# Patient Record
Sex: MALE | Race: WHITE | NOT HISPANIC OR LATINO | Employment: OTHER | URBAN - METROPOLITAN AREA
[De-identification: names, ages, dates, MRNs, and addresses within clinical notes are randomized per-mention and may not be internally consistent; named-entity substitution may affect disease eponyms.]

---

## 2017-10-06 ENCOUNTER — HOSPITAL ENCOUNTER (EMERGENCY)
Facility: MEDICAL CENTER | Age: 82
End: 2017-10-06
Attending: EMERGENCY MEDICINE
Payer: MEDICARE

## 2017-10-06 VITALS
TEMPERATURE: 98.9 F | BODY MASS INDEX: 26.03 KG/M2 | SYSTOLIC BLOOD PRESSURE: 132 MMHG | DIASTOLIC BLOOD PRESSURE: 75 MMHG | WEIGHT: 171.74 LBS | HEIGHT: 68 IN | HEART RATE: 61 BPM | RESPIRATION RATE: 20 BRPM

## 2017-10-06 DIAGNOSIS — T14.8XXA ABRASION: Primary | ICD-10-CM
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
## 2017-10-06 PROCEDURE — 303485 HCHG DRESSING MEDIUM

## 2017-10-06 PROCEDURE — 304217 HCHG IRRIGATION SYSTEM

## 2017-10-06 PROCEDURE — A6403 STERILE GAUZE>16 <= 48 SQ IN: HCPCS

## 2017-10-06 PROCEDURE — 99283 EMERGENCY DEPT VISIT LOW MDM: CPT

## 2017-10-06 RX ORDER — DOXYCYCLINE HYCLATE 100 MG/1
100 CAPSULE ORAL 2 TIMES DAILY
COMMUNITY

## 2017-10-06 ASSESSMENT — PAIN SCALES - GENERAL: PAINLEVEL_OUTOF10: 3

## 2017-10-06 NOTE — ED PROVIDER NOTES
ED Provider Note    CHIEF COMPLAINT  Chief Complaint   Patient presents with   • Arm Injury     left arm fell onto cement       HPI  James Hoffman is a 82 y.o. male who presents abrasion skin tear left forearm. The patient tripped over his hose outside on his deck today. He complains of left forearm skin abrasion only. No bony pain at all. Nothing makes it better, nothing makes it worse.    Patient complains of just abrasion to the skin on his elbow but no bony pain. Did not hurt his neck head spine knees or ankles. He did not lose consciousness.    Last tetanus shot 2012    REVIEW OF SYSTEMS  CONSTITUTIONAL:  Denies feveror weakness  EYES:  Denies  discharge   ENT:  Denies sore throat, nose or ear pain  CARDIOVASCULAR:  Denies chest pain, palpitations or swelling  RESPIRATORY:  Denies cough or shortness of breath or difficulty breathing  GI:  Denies abdominal pain,, vomiting, or diarrhea  MUSCULOSKELETAL:  Denies weakness joint swelling or back pain again he denies any bony pain at all.  SKIN: Positive skin tear large left elbow and forearm.  ALLERGIC: No itchy rashes.  NEUROLOGIC:  Denies headache, focal weakness or numbness      PAST MEDICAL HISTORY  Past Medical History:   Diagnosis Date   • HYPOTHYROIDISM 2014   • Dysphagia 2013    Dr. Joseph, vallecular lesion removed   • Colon polyp, hyperplastic 5/04   • Basal cell carcinoma of face 05/2004    Dr. Lucas   • CVA (cerebral vascular accident) (CMS-[facility]) 01/2003    Residual diplopia, Dr. Cleaning   • TIA 12/2000   • Allergic rhinitis    • ASTHMA    • Cataract    • GOUT    • Rosacea     Dr. Lucas       FAMILY HISTORY  Family History   Problem Relation Age of Onset   • Diabetes Mother    • Genetic Mother      dementia   • Heart Disease Sister    • Cancer Sister      breast cancer   • Diabetes Maternal Aunt    • Diabetes Maternal Grandmother    • Diabetes Maternal Grandfather    • Genetic Son      ALS       SOCIAL HISTORY   reports that he quit smoking about 54  "years ago. His smoking use included Cigarettes. He has a 10.00 pack-year smoking history. He has never used smokeless tobacco. He reports that he drinks alcohol. He reports that he does not use drugs.  He has been  60 years    SURGICAL HISTORY  Past Surgical History:   Procedure Laterality Date   • MICROLARYNGOSCOPY  1/21/2014    Performed by Jimbo Joseph M.D. at SURGERY SAME DAY [facility] ORS   • COLONOSCOPY WITH POLYP  5/2004    Hyperplastic   • KNEE ARTHROSCOPY  10/02    right, Dr. Engle   • TONSILLECTOMY         CURRENT MEDICATIONS  Home Medications     Reviewed by Jessenia Diamond (Pharmacy Tech) on 10/06/17 at 1037  Med List Status: Complete   Medication Last Dose Status   Azelaic Acid (FINACEA) 15 % GEL 10/5/2017 Active   clopidogrel (PLAVIX) 75 MG TABS 10/6/2017 Active   doxycycline (VIBRAMYCIN) 100 MG Cap 10/6/2017 Active   FLOVENT  MCG/ACT AERO 10/6/2017 Active   fluticasone (FLONASE) 50 MCG/ACT nasal spray 10/6/2017 Active   levothyroxine (SYNTHROID) 175 MCG TABS 10/6/2017 Active   METROGEL 0.75 % GEL 10/5/2017 Active                ALLERGIES  Allergies   Allergen Reactions   • Penicillins Anaphylaxis       PHYSICAL EXAM  VITAL SIGNS: /75   Pulse 61   Temp 37.2 °C (98.9 °F)   Resp 20   Ht 1.727 m (5' 8\")   Wt 77.9 kg (171 lb 11.8 oz)   BMI 26.11 kg/m²      Constitutional: Patient is awake alert person place and time. No acute respiratory distress Well developed, Well nourished, Non-toxic appearance.   HENT: Normocephalic, Atraumatic  Neck:  Supple no nuchal rigidity Non-tender  Cardiovascular: Heart is regular rate and rhythm no murmur  Thorax & Lungs: Chest is symmetrical, with good breath sounds. No wheezing or crackles. No respiratory distress  Abdomen:  Soft, No tenderness  Skin: Positive large skin tear left forearm. The skin is retracted. There is no fat to the skin. There is no suturable lesions. Bleeding has stopped. Small abrasion to the left knee but nontender " bones  Back: Non tender with palpation,  Extremities: No edema.  Non tender.   Musculoskeletal: Good range of motion wrists, elbows, shoulders, hips, knees, ankles   Neurologic: Alert & oriented  Strength is symmetrical in upper and lower extremities    RADIOLOGY/PROCEDURES  Areas clean and dry. Usual fashion. Debridement of the skin. I did not feel that this was suturable.  Long discussion with the patient and his wife. They will do wound care and dressing changes starting on Sunday and then every day afterwards. They also see her primary care doctor Monday    COURSE & MEDICAL DECISION MAKING  Pertinent Labs & Imaging studies reviewed. (See chart for details)  Patient who tripped today over a hose in the backyard. He has a large skin tear and abrasion to his left forearm. This is a very thin skin since he is older and also on Plavix for a previous stroke. This time we debrided the wound. I did not feel this is suturable. After debridement the wound was cleaned by nursing and a nonadherent dressing was placed.  Both he and his wife understand there is risk of infection and scarring tetanus is up-to-date   Stable for discharge    FINAL IMPRESSION  1. Fall  2. Wound abrasion left forearm and skin tear nonsuturable  3. History of anticoagulation and stroke     PLAN  1. Follow-up primary care doctor Monday  2. Wound care information sheet  3. Neosporin nonadherent dressing left forearm  4.Return to the emergency department for increased pains, fevers, vomiting or change in condition.  Electronically signed by: Andrew Esqueda, 10/6/2017 10:56 AM

## 2017-10-06 NOTE — DISCHARGE INSTRUCTIONS
Fall Prevention in the Home   Falls can cause injuries. They can happen to people of all ages. There are many things you can do to make your home safe and to help prevent falls.   WHAT CAN I DO ON THE OUTSIDE OF MY HOME?  · Regularly fix the edges of walkways and driveways and fix any cracks.  · Remove anything that might make you trip as you walk through a door, such as a raised step or threshold.  · Trim any bushes or trees on the path to your home.  · Use bright outdoor lighting.  · Clear any walking paths of anything that might make someone trip, such as rocks or tools.  · Regularly check to see if handrails are loose or broken. Make sure that both sides of any steps have handrails.  · Any raised decks and porches should have guardrails on the edges.  · Have any leaves, snow, or ice cleared regularly.  · Use sand or salt on walking paths during winter.  · Clean up any spills in your garage right away. This includes oil or grease spills.  WHAT CAN I DO IN THE BATHROOM?   · Use night lights.  · Install grab bars by the toilet and in the tub and shower. Do not use towel bars as grab bars.  · Use non-skid mats or decals in the tub or shower.  · If you need to sit down in the shower, use a plastic, non-slip stool.  · Keep the floor dry. Clean up any water that spills on the floor as soon as it happens.  · Remove soap buildup in the tub or shower regularly.  · Attach bath mats securely with double-sided non-slip rug tape.  · Do not have throw rugs and other things on the floor that can make you trip.  WHAT CAN I DO IN THE BEDROOM?  · Use night lights.  · Make sure that you have a light by your bed that is easy to reach.  · Do not use any sheets or blankets that are too big for your bed. They should not hang down onto the floor.  · Have a firm chair that has side arms. You can use this for support while you get dressed.  · Do not have throw rugs and other things on the floor that can make you trip.  WHAT CAN I DO IN  THE KITCHEN?  · Clean up any spills right away.  · Avoid walking on wet floors.  · Keep items that you use a lot in easy-to-reach places.  · If you need to reach something above you, use a strong step stool that has a grab bar.  · Keep electrical cords out of the way.  · Do not use floor polish or wax that makes floors slippery. If you must use wax, use non-skid floor wax.  · Do not have throw rugs and other things on the floor that can make you trip.  WHAT CAN I DO WITH MY STAIRS?  · Do not leave any items on the stairs.  · Make sure that there are handrails on both sides of the stairs and use them. Fix handrails that are broken or loose. Make sure that handrails are as long as the stairways.  · Check any carpeting to make sure that it is firmly attached to the stairs. Fix any carpet that is loose or worn.  · Avoid having throw rugs at the top or bottom of the stairs. If you do have throw rugs, attach them to the floor with carpet tape.  · Make sure that you have a light switch at the top of the stairs and the bottom of the stairs. If you do not have them, ask someone to add them for you.  WHAT ELSE CAN I DO TO HELP PREVENT FALLS?  · Wear shoes that:  ¨ Do not have high heels.  ¨ Have rubber bottoms.  ¨ Are comfortable and fit you well.  ¨ Are closed at the toe. Do not wear sandals.  · If you use a stepladder:  ¨ Make sure that it is fully opened. Do not climb a closed stepladder.  ¨ Make sure that both sides of the stepladder are locked into place.  ¨ Ask someone to hold it for you, if possible.  · Clearly ann emarie and make sure that you can see:  ¨ Any grab bars or handrails.  ¨ First and last steps.  ¨ Where the edge of each step is.  · Use tools that help you move around (mobility aids) if they are needed. These include:  ¨ Canes.  ¨ Walkers.  ¨ Scooters.  ¨ Crutches.  · Turn on the lights when you go into a dark area. Replace any light bulbs as soon as they burn out.  · Set up your furniture so you have a clear  path. Avoid moving your furniture around.  · If any of your floors are uneven, fix them.  · If there are any pets around you, be aware of where they are.  · Review your medicines with your doctor. Some medicines can make you feel dizzy. This can increase your chance of falling.  Ask your doctor what other things that you can do to help prevent falls.     This information is not intended to replace advice given to you by your health care provider. Make sure you discuss any questions you have with your health care provider.     Document Released: 10/14/2010 Document Revised: 05/03/2016 Document Reviewed: 01/22/2016  [employer] Interactive Patient Education ©2016 [employer]    Wound Care  Taking care of your wound properly can help to prevent pain and infection. It can also help your wound to heal more quickly.   HOW TO CARE FOR YOUR WOUND   · Take or apply over-the-counter and prescription medicines only as told by your health care provider.  · If you were prescribed antibiotic medicine, take or apply it as told by your health care provider. Do not stop using the antibiotic even if your condition improves.  · Clean the wound each day or as told by your health care provider.  ¨ Wash the wound with mild soap and water.  ¨ Rinse the wound with water to remove all soap.  ¨ Pat the wound dry with a clean towel. Do not rub it.  · There are many different ways to close and cover a wound. For example, a wound can be covered with stitches (sutures), skin glue, or adhesive strips. Follow instructions from your health care provider about:  ¨ How to take care of your wound.  ¨ When and how you should change your bandage (dressing).  ¨ When you should remove your dressing.  ¨ Removing whatever was used to close your wound.  · Check your wound every day for signs of infection. Watch for:  ¨ Redness, swelling, or pain.  ¨ Fluid, blood, or pus.  · Keep the dressing dry until your health care provider says it can be removed. Do not  take baths, swim, use a hot tub, or do anything that would put your wound underwater until your health care provider approves.  · Raise (elevate) the injured area above the level of your heart while you are sitting or lying down.  · Do not scratch or pick at the wound.  · Keep all follow-up visits as told by your health care provider. This is important.  SEEK MEDICAL CARE IF:  · You received a tetanus shot and you have swelling, severe pain, redness, or bleeding at the injection site.  · You have a fever.  · Your pain is not controlled with medicine.  · You have increased redness, swelling, or pain at the site of your wound.  · You have fluid, blood, or pus coming from your wound.  · You notice a bad smell coming from your wound or your dressing.  SEEK IMMEDIATE MEDICAL CARE IF:  · You have a red streak going away from your wound.     This information is not intended to replace advice given to you by your health care provider. Make sure you discuss any questions you have with your health care provider.     Document Released: 09/26/2009 Document Revised: 05/03/2016 Document Reviewed: 12/14/2015  [employer] Interactive Patient Education ©2016 [employer]

## 2017-10-06 NOTE — ED NOTES
Was wrapping up hose, caught his leg and tripped and fell hitting left arm, hip and knee. States he has a large skin tear on left forearm, he has it wrapped. Denies hitting head or loc

## 2017-10-07 ENCOUNTER — PATIENT OUTREACH (OUTPATIENT)
Dept: HEALTH INFORMATION MANAGEMENT | Facility: OTHER | Age: 82
End: 2017-10-07

## 2017-10-08 ENCOUNTER — HOSPITAL ENCOUNTER (EMERGENCY)
Facility: MEDICAL CENTER | Age: 82
End: 2017-10-08
Attending: EMERGENCY MEDICINE
Payer: MEDICARE

## 2017-10-08 VITALS
DIASTOLIC BLOOD PRESSURE: 80 MMHG | HEIGHT: 68 IN | HEART RATE: 57 BPM | WEIGHT: 172.84 LBS | OXYGEN SATURATION: 99 % | TEMPERATURE: 98.6 F | RESPIRATION RATE: 18 BRPM | SYSTOLIC BLOOD PRESSURE: 131 MMHG | BODY MASS INDEX: 26.2 KG/M2

## 2017-10-08 DIAGNOSIS — Z51.89 ENCOUNTER FOR WOUND RE-CHECK: ICD-10-CM

## 2017-10-08 PROCEDURE — 303485 HCHG DRESSING MEDIUM

## 2017-10-08 PROCEDURE — 99282 EMERGENCY DEPT VISIT SF MDM: CPT

## 2017-10-08 NOTE — DISCHARGE INSTRUCTIONS
Stop Plavix for 3 days. Return here in 1 week for wound recheck. Return here at once if you feel there are any problems.

## 2017-10-08 NOTE — ED NOTES
"Chief Complaint   Patient presents with   • Bleeding/Bruising     L arm, pt has skin tear, was changing the dressing but wound wont stop bleeding. on blood thinners     /80   Pulse (!) 57   Temp 37 °C (98.6 °F)   Resp 18   Ht 1.727 m (5' 8\")   Wt 78.4 kg (172 lb 13.5 oz)   SpO2 99%   BMI 26.28 kg/m²     "

## 2017-10-08 NOTE — ED NOTES
ER Tech redressed wound to RFA. Dressing CDI. Pt maintains good CMS to RH. DC instructions given to pt. Verbalized understanding. Pt steady on feet with 0 s/s distress noted. Pt dcd home with wife to drive.

## 2017-10-08 NOTE — ED PROVIDER NOTES
ED Provider Note    CHIEF COMPLAINT  Chief Complaint   Patient presents with   • Bleeding/Bruising     L arm, pt has skin tear, was changing the dressing but wound wont stop bleeding. on blood thinners       HPI  James Hoffman is a 82 y.o. male who presentsTo the emergency department because of bleeding from a wound on the left arm. Friday now 2 days ago the patient tripped and fell and suffered a large skin tear off of the proximal ulnar aspect of the forearm. The patient says he was seen in the emergency department and his wound was cleaned up and bandaged. The patient takes Plavix because of a history of stroke. Today he tried to change his dressing and when he pulled off the dressing the wound started bleeding again and he can't get it to stop at home so he's come to the emergency department. Other than removing his dressing this been no subsequent trauma and he has been doing well and feels that he was otherwise uninjured    REVIEW OF SYSTEMS as above    PAST MEDICAL HISTORY  Past Medical History:   Diagnosis Date   • HYPOTHYROIDISM 2014   • Dysphagia 2013    Dr. Joseph, vallecular lesion removed   • Colon polyp, hyperplastic 5/04   • Basal cell carcinoma of face 05/2004    Dr. Lucas   • CVA (cerebral vascular accident) (CMS-[facility]) 01/2003    Residual diplopia, Dr. Cleaning   • TIA 12/2000   • Allergic rhinitis    • ASTHMA    • Cataract    • GOUT    • Rosacea     Dr. Lucas       FAMILY HISTORY  Family History   Problem Relation Age of Onset   • Diabetes Mother    • Genetic Mother      dementia   • Heart Disease Sister    • Cancer Sister      breast cancer   • Diabetes Maternal Aunt    • Diabetes Maternal Grandmother    • Diabetes Maternal Grandfather    • Genetic Son      ALS       SOCIAL HISTORY  Social History     Social History   • Marital status:      Spouse name: Willa   • Number of children: 3   • Years of education: college     Occupational History   • Retired Retired     Social History Main  "Topics   • Smoking status: Former Smoker     Packs/day: 1.00     Years: 10.00     Types: Cigarettes     Quit date: 1/15/1963   • Smokeless tobacco: Never Used      Comment: quit 1963. h/o 1 ppd x 10 years   • Alcohol use Yes      Comment: 7-8 per week   • Drug use: No   • Sexual activity: Yes     Partners: Female     Other Topics Concern   • Not on file     Social History Narrative   • No narrative on file       SURGICAL HISTORY  Past Surgical History:   Procedure Laterality Date   • MICROLARYNGOSCOPY  1/21/2014    Performed by Jimbo Joseph M.D. at SURGERY [facility] DAY [facility]   • COLONOSCOPY WITH POLYP  5/2004    Hyperplastic   • KNEE ARTHROSCOPY  10/02    right, Dr. Engle   • TONSILLECTOMY         CURRENT MEDICATIONS  Home Medications    **Home medications have not yet been reviewed for this encounter**         ALLERGIES  Allergies   Allergen Reactions   • Penicillins Anaphylaxis       PHYSICAL EXAM  VITAL SIGNS: /80   Pulse (!) 57   Temp 37 °C (98.6 °F)   Resp 18   Ht 1.727 m (5' 8\")   Wt 78.4 kg (172 lb 13.5 oz)   SpO2 99%   BMI 26.28 kg/m²    Oxygen saturation is interpreted asAdequate  Constitutional: Awake and well-appearing individual in no distress  Musculoskeletal: There is a large wound on the left proximal forearm where the patient has sustained a skin tear. The torn skin itself has been removed and the wound base is slowly oozing. The patient can flex and extend his elbow without difficulty    MEDICAL DECISION MAKING and DISPOSITION  The patient has slow oozing from the wound, I do not think cauterizing this large area would be helpful. Surgicel mesh was placed over the wound and this was covered by Adaptic and then 4 x 4 gauze and then a gauze wrap. I have advised the patient to keep the wound covered in this fashion for one week and then to return to the emergency Department for wound check and we can assist him in the next dressing change if one is necessary. If he feels he is having " any other problems or needs assistance before then he is to return for recheck immediately    IMPRESSION  1. Encounter for wound recheck  2. Anticoagulation secondary to Plavix      Electronically signed by: Josué Godfrey, 10/8/2017 4:07 PM

## 2017-10-09 ENCOUNTER — PATIENT OUTREACH (OUTPATIENT)
Dept: HEALTH INFORMATION MANAGEMENT | Facility: OTHER | Age: 82
End: 2017-10-09

## 2017-10-09 NOTE — PROGRESS NOTES
Placed discharge outreach phone call to patient s/p ER discharge 10/8/17.  Left voicemail providing my contact information and instructions to call with any questions or concerns.

## 2018-04-25 ENCOUNTER — APPOINTMENT (OUTPATIENT)
Dept: RADIOLOGY | Facility: MEDICAL CENTER | Age: 83
End: 2018-04-25
Attending: EMERGENCY MEDICINE
Payer: MEDICARE

## 2018-04-25 ENCOUNTER — HOSPITAL ENCOUNTER (OUTPATIENT)
Facility: MEDICAL CENTER | Age: 83
End: 2018-04-26
Attending: EMERGENCY MEDICINE | Admitting: INTERNAL MEDICINE
Payer: MEDICARE

## 2018-04-25 DIAGNOSIS — G45.9 TRANSIENT CEREBRAL ISCHEMIA, UNSPECIFIED TYPE: ICD-10-CM

## 2018-04-25 DIAGNOSIS — R47.01 EXPRESSIVE APHASIA: ICD-10-CM

## 2018-04-25 LAB
ALBUMIN SERPL BCP-MCNC: 3.9 G/DL (ref 3.2–4.9)
ALBUMIN/GLOB SERPL: 1.6 G/DL
ALP SERPL-CCNC: 73 U/L (ref 30–99)
ALT SERPL-CCNC: 18 U/L (ref 2–50)
ANION GAP SERPL CALC-SCNC: 5 MMOL/L (ref 0–11.9)
APTT PPP: 26.8 SEC (ref 24.7–36)
AST SERPL-CCNC: 17 U/L (ref 12–45)
BASOPHILS # BLD AUTO: 0.8 % (ref 0–1.8)
BASOPHILS # BLD: 0.05 K/UL (ref 0–0.12)
BILIRUB SERPL-MCNC: 0.9 MG/DL (ref 0.1–1.5)
BUN SERPL-MCNC: 21 MG/DL (ref 8–22)
CALCIUM SERPL-MCNC: 9.5 MG/DL (ref 8.4–10.2)
CHLORIDE SERPL-SCNC: 109 MMOL/L (ref 96–112)
CO2 SERPL-SCNC: 23 MMOL/L (ref 20–33)
CREAT SERPL-MCNC: 0.8 MG/DL (ref 0.5–1.4)
EOSINOPHIL # BLD AUTO: 0.17 K/UL (ref 0–0.51)
EOSINOPHIL NFR BLD: 2.6 % (ref 0–6.9)
ERYTHROCYTE [DISTWIDTH] IN BLOOD BY AUTOMATED COUNT: 45.4 FL (ref 35.9–50)
EST. AVERAGE GLUCOSE BLD GHB EST-MCNC: 126 MG/DL
GLOBULIN SER CALC-MCNC: 2.5 G/DL (ref 1.9–3.5)
GLUCOSE SERPL-MCNC: 106 MG/DL (ref 65–99)
HBA1C MFR BLD: 6 % (ref 0–5.6)
HCT VFR BLD AUTO: 46.1 % (ref 42–52)
HGB BLD-MCNC: 15.9 G/DL (ref 14–18)
IMM GRANULOCYTES # BLD AUTO: 0.05 K/UL (ref 0–0.11)
IMM GRANULOCYTES NFR BLD AUTO: 0.8 % (ref 0–0.9)
INR PPP: 1.07 (ref 0.87–1.13)
LV EJECT FRACT  99904: 70
LV EJECT FRACT MOD 2C 99903: 80.76
LV EJECT FRACT MOD 4C 99902: 62.59
LV EJECT FRACT MOD BP 99901: 73.87
LYMPHOCYTES # BLD AUTO: 1.4 K/UL (ref 1–4.8)
LYMPHOCYTES NFR BLD: 21.6 % (ref 22–41)
MCH RBC QN AUTO: 30.9 PG (ref 27–33)
MCHC RBC AUTO-ENTMCNC: 34.5 G/DL (ref 33.7–35.3)
MCV RBC AUTO: 89.5 FL (ref 81.4–97.8)
MONOCYTES # BLD AUTO: 0.65 K/UL (ref 0–0.85)
MONOCYTES NFR BLD AUTO: 10 % (ref 0–13.4)
NEUTROPHILS # BLD AUTO: 4.15 K/UL (ref 1.82–7.42)
NEUTROPHILS NFR BLD: 64.2 % (ref 44–72)
NRBC # BLD AUTO: 0 K/UL
NRBC BLD-RTO: 0 /100 WBC
PLATELET # BLD AUTO: 218 K/UL (ref 164–446)
PMV BLD AUTO: 9.6 FL (ref 9–12.9)
POTASSIUM SERPL-SCNC: 3.9 MMOL/L (ref 3.6–5.5)
PROT SERPL-MCNC: 6.4 G/DL (ref 6–8.2)
PROTHROMBIN TIME: 13.8 SEC (ref 12–14.6)
RBC # BLD AUTO: 5.15 M/UL (ref 4.7–6.1)
SODIUM SERPL-SCNC: 137 MMOL/L (ref 135–145)
TROPONIN I SERPL-MCNC: <0.02 NG/ML (ref 0–0.04)
WBC # BLD AUTO: 6.5 K/UL (ref 4.8–10.8)

## 2018-04-25 PROCEDURE — 70450 CT HEAD/BRAIN W/O DYE: CPT

## 2018-04-25 PROCEDURE — 700102 HCHG RX REV CODE 250 W/ 637 OVERRIDE(OP): Performed by: INTERNAL MEDICINE

## 2018-04-25 PROCEDURE — 93306 TTE W/DOPPLER COMPLETE: CPT

## 2018-04-25 PROCEDURE — A9270 NON-COVERED ITEM OR SERVICE: HCPCS | Performed by: INTERNAL MEDICINE

## 2018-04-25 PROCEDURE — 36415 COLL VENOUS BLD VENIPUNCTURE: CPT

## 2018-04-25 PROCEDURE — 99285 EMERGENCY DEPT VISIT HI MDM: CPT

## 2018-04-25 PROCEDURE — 85610 PROTHROMBIN TIME: CPT

## 2018-04-25 PROCEDURE — 700117 HCHG RX CONTRAST REV CODE 255: Performed by: EMERGENCY MEDICINE

## 2018-04-25 PROCEDURE — 93306 TTE W/DOPPLER COMPLETE: CPT | Mod: 26 | Performed by: INTERNAL MEDICINE

## 2018-04-25 PROCEDURE — 70496 CT ANGIOGRAPHY HEAD: CPT

## 2018-04-25 PROCEDURE — 84484 ASSAY OF TROPONIN QUANT: CPT

## 2018-04-25 PROCEDURE — G0378 HOSPITAL OBSERVATION PER HR: HCPCS

## 2018-04-25 PROCEDURE — 70498 CT ANGIOGRAPHY NECK: CPT

## 2018-04-25 PROCEDURE — 99220 PR INITIAL OBSERVATION CARE,LEVL III: CPT | Performed by: INTERNAL MEDICINE

## 2018-04-25 PROCEDURE — 94760 N-INVAS EAR/PLS OXIMETRY 1: CPT

## 2018-04-25 PROCEDURE — 83036 HEMOGLOBIN GLYCOSYLATED A1C: CPT

## 2018-04-25 PROCEDURE — 85025 COMPLETE CBC W/AUTO DIFF WBC: CPT

## 2018-04-25 PROCEDURE — 85730 THROMBOPLASTIN TIME PARTIAL: CPT

## 2018-04-25 PROCEDURE — 80053 COMPREHEN METABOLIC PANEL: CPT

## 2018-04-25 PROCEDURE — 93005 ELECTROCARDIOGRAM TRACING: CPT | Performed by: EMERGENCY MEDICINE

## 2018-04-25 RX ORDER — ALBUTEROL SULFATE 90 UG/1
2 AEROSOL, METERED RESPIRATORY (INHALATION) EVERY 6 HOURS PRN
COMMUNITY
End: 2018-05-20

## 2018-04-25 RX ORDER — POLYETHYLENE GLYCOL 3350 17 G/17G
1 POWDER, FOR SOLUTION ORAL
Status: DISCONTINUED | OUTPATIENT
Start: 2018-04-25 | End: 2018-04-26 | Stop reason: HOSPADM

## 2018-04-25 RX ORDER — ACETAMINOPHEN 325 MG/1
650 TABLET ORAL EVERY 6 HOURS PRN
Status: DISCONTINUED | OUTPATIENT
Start: 2018-04-25 | End: 2018-04-26 | Stop reason: HOSPADM

## 2018-04-25 RX ORDER — ATORVASTATIN CALCIUM 40 MG/1
40 TABLET, FILM COATED ORAL EVERY EVENING
Status: DISCONTINUED | OUTPATIENT
Start: 2018-04-25 | End: 2018-04-26 | Stop reason: HOSPADM

## 2018-04-25 RX ORDER — RANITIDINE 300 MG/1
300 TABLET ORAL EVERY EVENING
COMMUNITY

## 2018-04-25 RX ORDER — LEVOTHYROXINE SODIUM 0.12 MG/1
150 TABLET ORAL
COMMUNITY

## 2018-04-25 RX ORDER — METRONIDAZOLE 10 MG/G
1 GEL TOPICAL DAILY
COMMUNITY

## 2018-04-25 RX ORDER — LEVOTHYROXINE SODIUM 0.05 MG/1
150 TABLET ORAL
Status: DISCONTINUED | OUTPATIENT
Start: 2018-04-26 | End: 2018-04-26 | Stop reason: HOSPADM

## 2018-04-25 RX ORDER — BISACODYL 10 MG
10 SUPPOSITORY, RECTAL RECTAL
Status: DISCONTINUED | OUTPATIENT
Start: 2018-04-25 | End: 2018-04-26 | Stop reason: HOSPADM

## 2018-04-25 RX ORDER — AMOXICILLIN 250 MG
2 CAPSULE ORAL 2 TIMES DAILY
Status: DISCONTINUED | OUTPATIENT
Start: 2018-04-25 | End: 2018-04-26 | Stop reason: HOSPADM

## 2018-04-25 RX ORDER — ALBUTEROL SULFATE 90 UG/1
2 AEROSOL, METERED RESPIRATORY (INHALATION) EVERY 6 HOURS PRN
Status: DISCONTINUED | OUTPATIENT
Start: 2018-04-25 | End: 2018-04-26 | Stop reason: HOSPADM

## 2018-04-25 RX ORDER — HEPARIN SODIUM 5000 [USP'U]/ML
5000 INJECTION, SOLUTION INTRAVENOUS; SUBCUTANEOUS EVERY 8 HOURS
Status: DISCONTINUED | OUTPATIENT
Start: 2018-04-25 | End: 2018-04-26 | Stop reason: HOSPADM

## 2018-04-25 RX ORDER — CLOPIDOGREL BISULFATE 75 MG/1
75 TABLET ORAL DAILY
Status: DISCONTINUED | OUTPATIENT
Start: 2018-04-26 | End: 2018-04-26 | Stop reason: HOSPADM

## 2018-04-25 RX ADMIN — IOHEXOL 75 ML: 350 INJECTION, SOLUTION INTRAVENOUS at 12:00

## 2018-04-25 RX ADMIN — ATORVASTATIN CALCIUM 40 MG: 40 TABLET, FILM COATED ORAL at 21:41

## 2018-04-25 ASSESSMENT — LIFESTYLE VARIABLES
EVER_SMOKED: YES
EVER HAD A DRINK FIRST THING IN THE MORNING TO STEADY YOUR NERVES TO GET RID OF A HANGOVER: NO
HOW MANY TIMES IN THE PAST YEAR HAVE YOU HAD 5 OR MORE DRINKS IN A DAY: 0
CONSUMPTION TOTAL: NEGATIVE
ON A TYPICAL DAY WHEN YOU DRINK ALCOHOL HOW MANY DRINKS DO YOU HAVE: 5
TOTAL SCORE: 0
TOTAL SCORE: 0
ALCOHOL_USE: YES
EVER FELT BAD OR GUILTY ABOUT YOUR DRINKING: NO
HAVE PEOPLE ANNOYED YOU BY CRITICIZING YOUR DRINKING: NO
TOTAL SCORE: 0
AVERAGE NUMBER OF DAYS PER WEEK YOU HAVE A DRINK CONTAINING ALCOHOL: 1
HAVE YOU EVER FELT YOU SHOULD CUT DOWN ON YOUR DRINKING: NO

## 2018-04-25 ASSESSMENT — ENCOUNTER SYMPTOMS
WEIGHT LOSS: 0
FOCAL WEAKNESS: 0
ORTHOPNEA: 0
NECK PAIN: 0
COUGH: 0
FEVER: 0
CHILLS: 0
SEIZURES: 0
BACK PAIN: 0
HEADACHES: 0
VOMITING: 0
SHORTNESS OF BREATH: 0
EYE PAIN: 0
DIZZINESS: 0
EYE REDNESS: 0
HEARTBURN: 0
DIARRHEA: 0
DEPRESSION: 0
STRIDOR: 0
PALPITATIONS: 0
EYE DISCHARGE: 0
INSOMNIA: 0
NAUSEA: 0
BLURRED VISION: 0
ABDOMINAL PAIN: 0
SPUTUM PRODUCTION: 0
NERVOUS/ANXIOUS: 0
MYALGIAS: 0

## 2018-04-25 ASSESSMENT — PAIN SCALES - GENERAL
PAINLEVEL_OUTOF10: 0
PAINLEVEL_OUTOF10: 0

## 2018-04-25 NOTE — ED NOTES
Pt amulated to and back from restroom steadily, continues to deny any symptoms at this time. ERP at bedside to update pt with results and decision to admit.

## 2018-04-25 NOTE — ED NOTES
Patient reports difficulty with speaking and vision changes that lasted approximately 15 minutes around 1030 today. Symptoms currently resolved, patient A&O X4, no facial droop, clear speech. Patient reports history of strokes, pt reports symptoms relatively similar in the past. Patient currently on plavix

## 2018-04-25 NOTE — ED PROVIDER NOTES
ED Provider Note    CHIEF COMPLAINT  Chief Complaint   Patient presents with   • Possible Stroke       HPI  James Hoffman is a 82 y.o. male who presents for evaluation of possible stroke.  The patient had a sudden onset of dysarthria and expressive aphasia this morning.  This lasted 30 minutes and then subsided.  The patient was diagnosed with a stroke in 2002 and placed on Plavix.  The patient denies: Headache, visual symptoms, prior respiratory symptoms, gastrointestinal symptoms, unilateral motor weakness, paresthesias, vertigo.  No other acute symptomatology or complaints.    REVIEW OF SYSTEMS  See HPI for further details. All other systems negative.    PAST MEDICAL HISTORY  Past Medical History:   Diagnosis Date   • Allergic rhinitis    • ASTHMA    • Basal cell carcinoma of face 05/2004    Dr. Lucas   • Cataract    • Colon polyp, hyperplastic 5/04   • CVA (cerebral vascular accident) (CMS[facility]) 01/2003    Residual diplopia, Dr. Cleaning   • Dysphagia 2013    Dr. Joseph, vallecular lesion removed   • GOUT    • HYPOTHYROIDISM 2014   • Rosacea     Dr. Lucas   • TIA 12/2000       FAMILY HISTORY  Family History   Problem Relation Age of Onset   • Diabetes Mother    • Genetic Mother      dementia   • Heart Disease Sister    • Cancer Sister      breast cancer   • Diabetes Maternal Aunt    • Diabetes Maternal Grandmother    • Diabetes Maternal Grandfather    • Genetic Son      ALS       SOCIAL HISTORY  Previous smoker but has not smoked since 1963; positive alcohol use;    SURGICAL HISTORY  Past Surgical History:   Procedure Laterality Date   • MICROLARYNGOSCOPY  1/21/2014    Performed by Jimbo oJseph M.D. at SURGERY SAME DAY [facility]   • COLONOSCOPY WITH POLYP  5/2004    Hyperplastic   • KNEE ARTHROSCOPY  10/02    right, Dr. Engle   • TONSILLECTOMY         CURRENT MEDICATIONS  See nurse's notes    ALLERGIES  Allergies   Allergen Reactions   • Penicillins Anaphylaxis       PHYSICAL EXAM  VITAL SIGNS: /84   " Pulse 67   Resp 18   Ht 1.727 m (5' 8\")   Wt 79.7 kg (175 lb 11.3 oz)   SpO2 94%   BMI 26.72 kg/m²    Constitutional: 82-year-old male, awake, oriented ×3   HENT: ,Atraumatic, Bilateral external ears normal, tympanic membranes clear, Oropharynx moist, No oral exudates, Nose normal.   Eyes: PERRL, EOMI, Conjunctiva normal, No discharge.   Neck: Normal range of motion, No tenderness, Supple, No stridor.   Lymphatic: No lymphadenopathy noted.   Cardiovascular: Normal heart rate, Normal rhythm, No murmurs, No rubs, No gallops.   Thorax & Lungs: Normal Equal breath sounds, No respiratory distress, No wheezing, no stridor, no rales. No chest tenderness.   Abdomen: Soft, nontender, nondistended, no organomegaly, positive bowel sounds normal in quality. No guarding or rebound.  Skin: Good skin turgor, pink, warm, dry. No rashes, petechiae, purpura. Normal capillary refill.   Back: No tenderness, No CVA tenderness.   Extremities: Intact distal pulses, No edema, No tenderness, No cyanosis, No clubbing. Vascular: Pulses are 2+, symmetric in the upper and lower extremities.  Musculoskeletal: Diffuse arthritic changes; No tenderness to palpation or major deformities noted.   Neurologic: Alert & oriented x 3, Normal motor function, Normal sensory function, No gross focal deficits noted.  NIH stroke scale of 0  Psychiatric: Affect normal, Judgment normal, Mood normal.       EKG  I have interpreted: Rate 70, rhythm sinus, axis normal, P-R QRS Q-T intervals normal, no acute STEMI, no acute change compared to a tracing of 1/15/14;    RADIOLOGY/PROCEDURES  CT-CTA NECK WITH & W/O-POST PROCESSING   Final Result      1.  Patent carotid and vertebral arteries without evidence of vascular occlusion or dissection.      2.  Minimal atherosclerotic plaque at the carotid bifurcations bilaterally. No significant flow-limiting stenosis.      CT-CTA HEAD WITH & W/O-POST PROCESS   Final Result      CT angiogram of the [place] " within normal limits.      CT-HEAD W/O   Final Result      1.  No evidence of acute intracranial process.      2.  Cerebral atrophy as well as periventricular chronic small vessel ischemic change.            COURSE & MEDICAL DECISION MAKING  Pertinent Labs & Imaging studies reviewed. (See chart for details)  1.  Monitor  2.  Saline lock    Laboratory studies: CBC and differential within normal; coags within normal; troponin less than 0.02; CMP within normal limits;    Discussion/consultation: At this time, the patient presents with neurological symptoms of dysarthria and/or expressive aphasia.  The symptoms lasted 30 minutes and now resolved.  Patient underwent a neurological workup.  At this time, I spoke with the hospitalist on-call.  The patient will be admitted for further monitoring, treatment, and care.      FINAL IMPRESSION  1. Expressive aphasia    2. Transient cerebral ischemia, unspecified type        PLAN  1.  The patient will be admitted for further monitoring, treatment, and care.  Electronically signed by: Guy G Gansert, 4/25/2018 11:06 AM

## 2018-04-25 NOTE — ED NOTES
Med rec complete per patient with medication list  Allergies reviewed     Patient takes Doxycyline daily for ance

## 2018-04-25 NOTE — ASSESSMENT & PLAN NOTE
History of TIA in the past  Initially a CT scan was negative  Workup MRI of the brain, echocardiogram  On Plavix and atorvastatin  PTOT  Speech

## 2018-04-25 NOTE — FLOWSHEET NOTE
04/25/18 1415   Interdisciplinary Plan of Care-Goals (Indications)   Bronchodilator Indications History / Diagnosis   Therapy Not Performed   Type of Therapy Not Performed MDI   Reason Therapy Not Performed PRN, No Indication  (PRN albuterol not needed)   Chest Exam   Respiration 18   Pulse (!) 53   Breath Sounds   RUL Breath Sounds Clear   RML Breath Sounds Clear   RLL Breath Sounds Clear   AMEENA Breath Sounds Clear   LLL Breath Sounds Clear   Oxygen   Pulse Oximetry 99 %   O2 Daily Delivery Respiratory  Room Air with O2 Available

## 2018-04-26 ENCOUNTER — APPOINTMENT (OUTPATIENT)
Dept: RADIOLOGY | Facility: MEDICAL CENTER | Age: 83
End: 2018-04-26
Attending: INTERNAL MEDICINE
Payer: MEDICARE

## 2018-04-26 VITALS
HEIGHT: 68 IN | BODY MASS INDEX: 26.63 KG/M2 | HEART RATE: 52 BPM | RESPIRATION RATE: 18 BRPM | DIASTOLIC BLOOD PRESSURE: 59 MMHG | OXYGEN SATURATION: 96 % | WEIGHT: 175.71 LBS | SYSTOLIC BLOOD PRESSURE: 134 MMHG | TEMPERATURE: 97.5 F

## 2018-04-26 PROBLEM — G45.9 TIA (TRANSIENT ISCHEMIC ATTACK): Status: RESOLVED | Noted: 2018-04-25 | Resolved: 2018-04-26

## 2018-04-26 LAB
ANION GAP SERPL CALC-SCNC: 3 MMOL/L (ref 0–11.9)
BUN SERPL-MCNC: 18 MG/DL (ref 8–22)
CALCIUM SERPL-MCNC: 9.3 MG/DL (ref 8.4–10.2)
CHLORIDE SERPL-SCNC: 111 MMOL/L (ref 96–112)
CHOLEST SERPL-MCNC: 149 MG/DL (ref 100–199)
CO2 SERPL-SCNC: 25 MMOL/L (ref 20–33)
CREAT SERPL-MCNC: 0.7 MG/DL (ref 0.5–1.4)
ERYTHROCYTE [DISTWIDTH] IN BLOOD BY AUTOMATED COUNT: 46.3 FL (ref 35.9–50)
GLUCOSE SERPL-MCNC: 101 MG/DL (ref 65–99)
HCT VFR BLD AUTO: 44.8 % (ref 42–52)
HDLC SERPL-MCNC: 58 MG/DL
HGB BLD-MCNC: 15.3 G/DL (ref 14–18)
LDLC SERPL CALC-MCNC: 74 MG/DL
MCH RBC QN AUTO: 30.6 PG (ref 27–33)
MCHC RBC AUTO-ENTMCNC: 34.2 G/DL (ref 33.7–35.3)
MCV RBC AUTO: 89.6 FL (ref 81.4–97.8)
PLATELET # BLD AUTO: 213 K/UL (ref 164–446)
PMV BLD AUTO: 10 FL (ref 9–12.9)
POTASSIUM SERPL-SCNC: 3.9 MMOL/L (ref 3.6–5.5)
RBC # BLD AUTO: 5 M/UL (ref 4.7–6.1)
SODIUM SERPL-SCNC: 139 MMOL/L (ref 135–145)
TRIGL SERPL-MCNC: 84 MG/DL (ref 0–149)
WBC # BLD AUTO: 5.9 K/UL (ref 4.8–10.8)

## 2018-04-26 PROCEDURE — 99217 PR OBSERVATION CARE DISCHARGE: CPT | Performed by: HOSPITALIST

## 2018-04-26 PROCEDURE — 85027 COMPLETE CBC AUTOMATED: CPT

## 2018-04-26 PROCEDURE — 700102 HCHG RX REV CODE 250 W/ 637 OVERRIDE(OP): Performed by: INTERNAL MEDICINE

## 2018-04-26 PROCEDURE — A9270 NON-COVERED ITEM OR SERVICE: HCPCS | Performed by: INTERNAL MEDICINE

## 2018-04-26 PROCEDURE — 80061 LIPID PANEL: CPT

## 2018-04-26 PROCEDURE — 80048 BASIC METABOLIC PNL TOTAL CA: CPT

## 2018-04-26 PROCEDURE — 70551 MRI BRAIN STEM W/O DYE: CPT

## 2018-04-26 PROCEDURE — G0378 HOSPITAL OBSERVATION PER HR: HCPCS

## 2018-04-26 RX ADMIN — LEVOTHYROXINE SODIUM 150 MCG: 50 TABLET ORAL at 06:46

## 2018-04-26 RX ADMIN — CLOPIDOGREL BISULFATE 75 MG: 75 TABLET ORAL at 09:26

## 2018-04-26 ASSESSMENT — PAIN SCALES - GENERAL
PAINLEVEL_OUTOF10: 0
PAINLEVEL_OUTOF10: 0

## 2018-04-26 NOTE — CARE PLAN
Problem: Communication  Goal: The ability to communicate needs accurately and effectively will improve  Outcome: PROGRESSING AS EXPECTED    Intervention: Educate patient and significant other/support system about the plan of care, procedures, treatments, medications and allow for questions   04/26/18 0036   OTHER   Pt & Family Have Been Educated on Methods Available to Report Concerns Related to Care, Treatment, Services, and Patient Safety Issues Yes     Plan of care discussed with pt. Pt aware of and agree to treatment plan but hoping to go home tomorrow. Encouraged pt to ask questions and to communicate any other needs or concerns to staff while still in the hospital. Pt verbalized understanding.      Problem: Safety  Goal: Will remain free from falls  Outcome: PROGRESSING AS EXPECTED    Intervention: Implement fall precautions   04/26/18 0036   OTHER   Environmental Precautions Treaded Slipper Socks on Patient;Bed in Low Position;Personal Belongings, Wastebasket, Call Bell etc. in Easy Reach;Transferred to Stronger Side;Communication Sign for Patients & Families;Report Given to Other Health Care Providers Regarding Fall Risk;Mobility Assessed & Appropriate Sign Placed   Bedrails Bedrails Closest to Bathroom Down     Educated pt on using the call light when needing assistance to prevent falls and injury while in the hospital. Pt verbalized understanding. Other safety measures mentioned above in place.

## 2018-04-26 NOTE — PROGRESS NOTES
12 hour chart check done.    Bedside report given to dayshift LULA Gtz. Plan of care discussed. All questions addressed. No complaints or needs from patient as of this time. Safety measures in place.

## 2018-04-26 NOTE — DISCHARGE SUMMARY
CHIEF COMPLAINT ON ADMISSION  Chief Complaint   Patient presents with   • Possible Stroke       CODE STATUS  Full Code    HPI & HOSPITAL COURSE  This is a 82 y.o. male here with expressive aphasia. Patient has a prior history of a TIA. When he presented he stayed at about 10:30 in the morning he had episode of expressive aphasia. At last a few minutes and then went away on its own. He had no other deficits and he felt back to his baseline by the time he arrived in the emergency department. He was put under observation status with cardiac monitoring. CT angiogram of his head and neck was performed in the emergency department this was unremarkable. CT head was unremarkable as well.  MRI of his brain was obtained and this is within normal limits. Echocardiogram was also unremarkable. His symptoms had completely resolved and he feels back to his baseline. By the time of discharge she is ambulating and tolerating a diet. His vitals and his labs are stable. He is to be discharged home in stable condition.    The patient recovered much more quickly than anticipated on admission.    Therefore, he is discharged in good and stable condition with close outpatient follow-up.    SPECIFIC OUTPATIENT FOLLOW-UP  PCP as needed    DISCHARGE PROBLEM LIST  Active Problems:    Hypothyroid POA: Yes  Resolved Problems:    TIA (transient ischemic attack) POA: Yes      FOLLOW UP  No future appointments.  No follow-up provider specified.    MEDICATIONS ON DISCHARGE   Dalton, James H   Home Medication Instructions JUAN JOSE:[id number]    Printed on:04/26/18 1209   Medication Information                      albuterol 108 (90 Base) MCG/ACT Aero Soln inhalation aerosol  Inhale 2 Puffs by mouth every 6 hours as needed for Shortness of Breath.             Azelaic Acid (FINACEA) 15 % GEL  1 Application by Apply externally route 2 Times a Day.             clopidogrel (PLAVIX) 75 MG TABS  Take 1 Tab by mouth every day.             doxycycline (VIBRAMYCIN) 100 MG  Cap  Take 100 mg by mouth every day. ACNE             FLOVENT  MCG/ACT AERO  INHALE 2 PUFFS BY MOUTH 2TIMES A DAY.             fluticasone (FLONASE) 50 MCG/ACT nasal spray  Spray 2 Sprays in nose every day.             levothyroxine (SYNTHROID) 150 MCG Tab  Take 150 mcg by mouth Every morning on an empty stomach.             metronidazole (METROGEL) 1 % gel  Apply 1 Application to affected area(s) every day.             multivitamin (THERAGRAN) Tab  Take 1 Tab by mouth every day.             Probiotic Product (PROBIOTIC PO)  Take 1 Cap by mouth every day.             ranitidine (ZANTAC) 300 MG tablet  Take 300 mg by mouth every evening.                 DIET  Orders Placed This Encounter   Procedures   • DIET ORDER     Standing Status:   Standing     Number of Occurrences:   1     Order Specific Question:   Diet:     Answer:   2 Gram Sodium [7]   • DISCONTINUE DIET TRAY     Standing Status:   Standing     Number of Occurrences:   1       ACTIVITY  As tolerated.  Weight bearing as tolerated      CONSULTATIONS  None    PROCEDURES  None    LABORATORY  Lab Results   Component Value Date/Time    SODIUM 139 04/26/2018 05:23 AM    POTASSIUM 3.9 04/26/2018 05:23 AM    CHLORIDE 111 04/26/2018 05:23 AM    CO2 25 04/26/2018 05:23 AM    GLUCOSE 101 (H) 04/26/2018 05:23 AM    BUN 18 04/26/2018 05:23 AM    CREATININE 0.70 04/26/2018 05:23 AM    CREATININE 1.0 02/05/2009 08:20 AM        Lab Results   Component Value Date/Time    WBC 5.9 04/26/2018 05:23 AM    HEMOGLOBIN 15.3 04/26/2018 05:23 AM    HEMATOCRIT 44.8 04/26/2018 05:23 AM    PLATELETCT 213 04/26/2018 05:23 AM        Total time of the discharge process exceeds 31 minutes

## 2018-04-26 NOTE — PROGRESS NOTES
Report received from Patrica about 1255 and pt transferred about 1330.  Pt ambulated from the gurney to the bed independently.  Assessment done and pt reports that he normally has a droop on his left corner of his mouth and that his eye brow is lower.  He also reports that his has numbness and tingling in his hands that comes and goes.   Bedside swallow eval completed and diet ordered.  Admit profile and medication list reconciled.  In reviewing poc, pt had orders for tele and monitor placed.  Faxed screening sheet to MRI and he is up for 0800 tomorrow.  Echo completed at bedside about 1530.  VSS.  Report given to Fabi.    Tele strip at 1655 shows SR at 63.      Measurements from am strip were as follows:  VA=0.24 First Degree HB  QRS=0.08  QT=0.42    Tele Shift Summary:    Rhythm : SB to SR  Rate : 50-80  Ectopy : Per CCT Yanira, pt had frequent PVCs and PACs and rare couplets.     Telemetry monitoring strips placed in pt chart.

## 2018-04-26 NOTE — THERAPY
Occupational Therapy- Attempted evaluation as ordered, pt just now leaving floor to go to MRI.  Will return to see pt as soon as possible.

## 2018-04-26 NOTE — PROGRESS NOTES
1855 Received bedside report from vinniehilele Suggs. Plan of care discussed. Safety measures in place. Pt resting in bed, no complaints as of this time.     1915 Assessment completed. Medications reviewed. Pt A&Ox4, neuro checks completed, speech clear, left facial droop at baseline per pt report. Pt denied pain, SOB, N/V, weakness, lightheadedness/dizziness, or any other complaints. Medications reviewed. Plan of care discussed. Pt aware of and agree to treatment plan. Pt hoping to go home tomorrow if everything is normal. Educated pt on fall & injury prevention and safety while in the hospital and encouraged to use the call light when needing assistance. Pt verbalized understanding. No other needs identified by pt currently. Fall precautions in place. Will continue to monitor.      Telemetry  Tele strip summary:    WV: 0.24  QRS: 0.08  QT: 0.44    Ectopy: Frequent PVCs & PACs, rare couplets    HR: 51-63    Sinus Bradycardia-Rhythm with First Degree AVB

## 2018-05-14 LAB — EKG IMPRESSION: NORMAL

## 2018-05-20 ENCOUNTER — HOSPITAL ENCOUNTER (EMERGENCY)
Facility: MEDICAL CENTER | Age: 83
End: 2018-05-20
Attending: EMERGENCY MEDICINE
Payer: MEDICARE

## 2018-05-20 VITALS
RESPIRATION RATE: 18 BRPM | TEMPERATURE: 97.8 F | BODY MASS INDEX: 26.4 KG/M2 | OXYGEN SATURATION: 99 % | WEIGHT: 174.16 LBS | SYSTOLIC BLOOD PRESSURE: 150 MMHG | HEIGHT: 68 IN | DIASTOLIC BLOOD PRESSURE: 90 MMHG | HEART RATE: 66 BPM

## 2018-05-20 DIAGNOSIS — S41.112A LACERATION OF LEFT UPPER EXTREMITY, INITIAL ENCOUNTER: ICD-10-CM

## 2018-05-20 PROCEDURE — 304999 HCHG REPAIR-SIMPLE/INTERMED LEVEL 1

## 2018-05-20 PROCEDURE — 304217 HCHG IRRIGATION SYSTEM

## 2018-05-20 PROCEDURE — 99282 EMERGENCY DEPT VISIT SF MDM: CPT

## 2018-05-20 PROCEDURE — 12002 RPR S/N/AX/GEN/TRNK2.6-7.5CM: CPT

## 2018-05-20 ASSESSMENT — PAIN SCALES - GENERAL: PAINLEVEL_OUTOF10: 4

## 2018-05-20 NOTE — ED PROVIDER NOTES
ED Provider Note    CHIEF COMPLAINT  Chief Complaint   Patient presents with   • Arm Injury       HPI  James Hoffman is a 82 y.o. male who presents evaluation of a skin laceration to his left forearm. He is on Plavix. Has been on Plavix for quite some time. He said a previous TIA. He is real thin skin because of the Plavix and his age and he tore the skin today. Did not fracture any bones in his forearm. he sustained a laceration to his left mid forearm that couldn't get to stop bleeding.    REVIEW OF SYSTEMS  See HPI for further details. Otherwise healthy. No other bleeding problems. No headache, no memory loss, no weakness, etc. He is eating well taking in fluids well.  PAST MEDICAL HISTORY  Past Medical History:   Diagnosis Date   • Allergic rhinitis    • ASTHMA    • Basal cell carcinoma of face 05/2004    Dr. Lucas   • Cataract    • Colon polyp, hyperplastic 5/04   • CVA (cerebral vascular accident) (HCC) 01/2003    Residual diplopia, Dr. Cleaning   • Dysphagia 2013    Dr. Joseph, vallecular lesion removed   • GOUT    • HYPOTHYROIDISM 2014   • Rosacea     Dr. Lucas   • TIA 12/2000       FAMILY HISTORY  Family History   Problem Relation Age of Onset   • Diabetes Mother    • Genetic Mother      dementia   • Heart Disease Sister    • Cancer Sister      breast cancer   • Diabetes Maternal Aunt    • Diabetes Maternal Grandmother    • Diabetes Maternal Grandfather    • Genetic Son      ALS       SOCIAL HISTORY  Social History     Social History   • Marital status:      Spouse name: Willa   • Number of children: 3   • Years of education: college     Occupational History   • Retired Retired     Social History Main Topics   • Smoking status: Former Smoker     Packs/day: 1.00     Years: 10.00     Types: Cigarettes     Quit date: 1/15/1963   • Smokeless tobacco: Never Used      Comment: quit 1963. h/o 1 ppd x 10 years   • Alcohol use Yes      Comment: 7-8 per week   • Drug use: No   • Sexual activity: Yes      "Partners: Female     Other Topics Concern   • Not on file     Social History Narrative   • No narrative on file       SURGICAL HISTORY  Past Surgical History:   Procedure Laterality Date   • MICROLARYNGOSCOPY  1/21/2014    Performed by Jimbo Joseph M.D. at SURGERY SAME DAY [facility]   • COLONOSCOPY WITH POLYP  5/2004    Hyperplastic   • KNEE ARTHROSCOPY  10/02    right, Dr. Engle   • TONSILLECTOMY         CURRENT MEDICATIONS  Home Medications     Reviewed by Jessenia Chi (Pharmacy Tech) on 05/20/18 at 0957  Med List Status: Complete   Medication Last Dose Status   clopidogrel (PLAVIX) 75 MG TABS 5/20/2018 Active   doxycycline (VIBRAMYCIN) 100 MG Cap 5/20/2018 Active   FLOVENT  MCG/ACT AERO 5/20/2018 Active   fluticasone (FLONASE) 50 MCG/ACT nasal spray 5/20/2018 Active   levothyroxine (SYNTHROID) 150 MCG Tab 5/19/2018 Active   metronidazole (METROGEL) 1 % gel 5/20/2018 Active   multivitamin (THERAGRAN) Tab 5/20/2018 Active   Probiotic Product (PROBIOTIC PO) 5/20/2018 Active   ranitidine (ZANTAC) 300 MG tablet 5/19/2018 Active                ALLERGIES  Allergies   Allergen Reactions   • Penicillins Anaphylaxis       PHYSICAL EXAM  VITAL SIGNS: /90   Pulse 66   Temp 36.6 °C (97.8 °F)   Resp 18   Ht 1.727 m (5' 8\") Comment: Stated  Wt 79 kg (174 lb 2.6 oz)   SpO2 99%   BMI 26.48 kg/m²   vital signs are noted  Constitutional: Alert healthy-appearing adult in no distress   HENT: Normocephalic   Cardiovascular: Regular rhythm   Skin: Warm, Dry, No rash. Patient has a 3.5 cm flap laceration to the left forearm. It just involving the epidermis and the superficial layer of the dermis. Very thin skin.   usculoskeletal: No tenderness or pain or swelling or deformity to the left forearm. Other extremities are nontender.  Neurologic: Alert  Normal motor function,  No obvious focal deficits noted.   Psychiatric: Affect normal, and mood normal.     COURSE & MEDICAL DECISION MAKING  Age and is on " Plavix. He has a 3.5 cm laceration to his left forearm. Very thin skin. Involves the superficial layer of the dermis and the epidermis. No bony deformity. Good radial pulse. He is on Plavix.    Procedure: The 3.5 cm laceration was anesthetized lidocaine local. The wound was cleaned and irrigated thoroughly. The skin flap had to be debrided and removed. I applied some Surgicel and a rush her bandage. No sutures. No staples.    Home treatment: #1 where the bandage for 3 days number to return here in 3 days for wound evaluation and dressing change.    FINAL IMPRESSION  1. Left forearm laceration 3.5 cm         Electronically signed by: Gary Gansert, 5/20/2018 10:12 AM

## 2018-05-20 NOTE — DISCHARGE INSTRUCTIONS
Laceration Care, Adult  A laceration is a cut that goes through all of the layers of the skin and into the tissue that is right under the skin. Some lacerations heal on their own. Others need to be closed with stitches (sutures), staples, skin adhesive strips, or skin glue. Proper laceration care minimizes the risk of infection and helps the laceration to heal better.  HOW TO CARE FOR YOUR LACERATION  If sutures or staples were used:  · Keep the wound clean and dry.  · If you were given a bandage (dressing), you should change it at least one time per day or as told by your health care provider. You should also change it if it becomes wet or dirty.  · Keep the wound completely dry for the first 24 hours or as told by your health care provider. After that time, you may shower or bathe. However, make sure that the wound is not soaked in water until after the sutures or staples have been removed.  · Clean the wound one time each day or as told by your health care provider:  ¨ Wash the wound with soap and water.  ¨ Rinse the wound with water to remove all soap.  ¨ Pat the wound dry with a clean towel. Do not rub the wound.  · After cleaning the wound, apply a thin layer of antibiotic ointment as told by your health care provider. This will help to prevent infection and keep the dressing from sticking to the wound.  · Have the sutures or staples removed as told by your health care provider.  If skin adhesive strips were used:  · Keep the wound clean and dry.  · If you were given a bandage (dressing), you should change it at least one time per day or as told by your health care provider. You should also change it if it becomes dirty or wet.  · Do not get the skin adhesive strips wet. You may shower or bathe, but be careful to keep the wound dry.  · If the wound gets wet, pat it dry with a clean towel. Do not rub the wound.  · Skin adhesive strips fall off on their own. You may trim the strips as the wound heals. Do not  remove skin adhesive strips that are still stuck to the wound. They will fall off in time.  If skin glue was used:  · Try to keep the wound dry, but you may briefly wet it in the shower or bath. Do not soak the wound in water, such as by swimming.  · After you have showered or bathed, gently pat the wound dry with a clean towel. Do not rub the wound.  · Do not do any activities that will make you sweat heavily until the skin glue has fallen off on its own.  · Do not apply liquid, cream, or ointment medicine to the wound while the skin glue is in place. Using those may loosen the film before the wound has healed.  · If you were given a bandage (dressing), you should change it at least one time per day or as told by your health care provider. You should also change it if it becomes dirty or wet.  · If a dressing is placed over the wound, be careful not to apply tape directly over the skin glue. Doing that may cause the glue to be pulled off before the wound has healed.  · Do not pick at the glue. The skin glue usually remains in place for 5-10 days, then it falls off of the skin.  General Instructions  · Take over-the-counter and prescription medicines only as told by your health care provider.  · If you were prescribed an antibiotic medicine or ointment, take or apply it as told by your doctor. Do not stop using it even if your condition improves.  · To help prevent scarring, make sure to cover your wound with sunscreen whenever you are outside after stitches are removed, after adhesive strips are removed, or when glue remains in place and the wound is healed. Make sure to wear a sunscreen of at least 30 SPF.  · Do not scratch or pick at the wound.  · Keep all follow-up visits as told by your health care provider. This is important.  · Check your wound every day for signs of infection. Watch for:  ¨ Redness, swelling, or pain.  ¨ Fluid, blood, or pus.  · Raise (elevate) the injured area above the level of your heart  while you are sitting or lying down, if possible.  SEEK MEDICAL CARE IF:  · You received a tetanus shot and you have swelling, severe pain, redness, or bleeding at the injection site.  · You have a fever.  · A wound that was closed breaks open.  · You notice a bad smell coming from your wound or your dressing.  · You notice something coming out of the wound, such as wood or glass.  · Your pain is not controlled with medicine.  · You have increased redness, swelling, or pain at the site of your wound.  · You have fluid, blood, or pus coming from your wound.  · You notice a change in the color of your skin near your wound.  · You need to change the dressing frequently due to fluid, blood, or pus draining from the wound.  · You develop a new rash.  · You develop numbness around the wound.  SEEK IMMEDIATE MEDICAL CARE IF:  · You develop severe swelling around the wound.  · Your pain suddenly increases and is severe.  · You develop painful lumps near the wound or on skin that is anywhere on your body.  · You have a red streak going away from your wound.  · The wound is on your hand or foot and you cannot properly move a finger or toe.  · The wound is on your hand or foot and you notice that your fingers or toes look pale or bluish.     This information is not intended to replace advice given to you by your health care provider. Make sure you discuss any questions you have with your health care provider.     Document Released: 12/18/2006 Document Revised: 05/03/2016 Document Reviewed: 12/14/2015  [employer] Interactive Patient Education ©2016 [employer]

## 2018-05-20 NOTE — ED NOTES
Pt reports a GLF earlier this AM with injury to his left forearm.  He is on Plavix therapy after a stroke in 2001.

## 2018-05-22 ENCOUNTER — HOSPITAL ENCOUNTER (EMERGENCY)
Facility: MEDICAL CENTER | Age: 83
End: 2018-05-22
Attending: EMERGENCY MEDICINE
Payer: MEDICARE

## 2018-05-22 VITALS
OXYGEN SATURATION: 98 % | TEMPERATURE: 98.7 F | DIASTOLIC BLOOD PRESSURE: 66 MMHG | SYSTOLIC BLOOD PRESSURE: 117 MMHG | HEART RATE: 56 BPM | BODY MASS INDEX: 26.51 KG/M2 | WEIGHT: 174.38 LBS | RESPIRATION RATE: 18 BRPM

## 2018-05-22 DIAGNOSIS — Z79.02 PLATELET INHIBITION DUE TO PLAVIX: ICD-10-CM

## 2018-05-22 DIAGNOSIS — S51.802A OPEN WOUND OF LEFT FOREARM, INITIAL ENCOUNTER: Primary | ICD-10-CM

## 2018-05-22 PROCEDURE — 99283 EMERGENCY DEPT VISIT LOW MDM: CPT

## 2018-05-22 ASSESSMENT — PAIN SCALES - WONG BAKER: WONGBAKER_NUMERICALRESPONSE: HURTS JUST A LITTLE BIT

## 2018-05-22 NOTE — ED NOTES
D/c pt home, with family . Pt and family aware of f/u instructions with wound care Thursday  , aware to return for any changes or concerns. No further questions upon d/c home from ed

## 2018-05-22 NOTE — DISCHARGE INSTRUCTIONS
Follow up at [facility] on Thursday at 4 PM for reevaluation and continued care.      Leave dressing in place until seen for reevaluation.    Continue any home medications as previously indicated.    Return to the emergency department for increased pain, redness, swelling, bleeding (Soiled bandage) or other drainage, fever or other new concerns.     Wound Care, Adult  Taking care of your wound properly can help to prevent pain and infection. It can also help your wound to heal more quickly.  How is this treated?  Wound care  · Follow instructions from your health care provider about how to take care of your wound. Make sure you:  ¨ Wash your hands with soap and water before you change the bandage (dressing). If soap and water are not available, use hand .  ¨ Change your dressing as told by your health care provider.  ¨ Leave stitches (sutures), skin glue, or adhesive strips in place. These skin closures may need to stay in place for 2 weeks or longer. If adhesive strip edges start to loosen and curl up, you may trim the loose edges. Do not remove adhesive strips completely unless your health care provider tells you to do that.  · Check your wound area every day for signs of infection. Check for:  ¨ More redness, swelling, or pain.  ¨ More fluid or blood.  ¨ Warmth.  ¨ Pus or a bad smell.  · Ask your health care provider if you should clean the wound with mild soap and water. Doing this may include:  ¨ Using a clean towel to pat the wound dry after cleaning it. Do not rub or scrub the wound.  ¨ Applying a cream or ointment. Do this only as told by your health care provider.  ¨ Covering the incision with a clean dressing.  · Ask your health care provider when you can leave the wound uncovered.  Medicines   · If you were prescribed an antibiotic medicine, cream, or ointment, take or use the antibiotic as told by your health care provider. Do not stop taking or using the antibiotic even if your  condition improves.  · Take over-the-counter and prescription medicines only as told by your health care provider. If you were prescribed pain medicine, take it at least 30 minutes before doing any wound care or as told by your health care provider.  General instructions  · Return to your normal activities as told by your health care provider. Ask your health care provider what activities are safe.  · Do not scratch or pick at the wound.  · Keep all follow-up visits as told by your health care provider. This is important.  · Eat a diet that includes protein, vitamin A, vitamin C, and other nutrient-rich foods. These help the wound heal:  ¨ Protein-rich foods include meat, dairy, beans, nuts, and other sources.  ¨ Vitamin A-rich foods include carrots and dark green, leafy vegetables.  ¨ Vitamin C-rich foods include citrus, tomatoes, and other fruits and vegetables.  ¨ Nutrient-rich foods have protein, carbohydrates, fat, vitamins, or minerals. Eat a variety of healthy foods including vegetables, fruits, and whole grains.  Contact a health care provider if:  · You received a tetanus shot and you have swelling, severe pain, redness, or bleeding at the injection site.  · Your pain is not controlled with medicine.  · You have more redness, swelling, or pain around the wound.  · You have more fluid or blood coming from the wound.  · Your wound feels warm to the touch.  · You have pus or a bad smell coming from the wound.  · You have a fever or chills.  · You are nauseous or you vomit.  · You are dizzy.  Get help right away if:  · You have a red streak going away from your wound.  · The edges of the wound open up and separate.  · Your wound is bleeding and the bleeding does not stop with gentle pressure.  · You have a rash.  · You faint.  · You have trouble breathing.  This information is not intended to replace advice given to you by your health care provider. Make sure you discuss any questions you have with your health  care provider.  Document Released: 09/26/2009 Document Revised: 08/16/2017 Document Reviewed: 07/04/2017  [employer] Interactive Patient Education © 2017 [employer]

## 2018-05-22 NOTE — ED PROVIDER NOTES
ED Provider Note    CHIEF COMPLAINT  Chief Complaint   Patient presents with   • Wound Check       HPI  James Hoffman is a 82 y.o. male who ambulates to the emergency department for wound check.  Patient was seen on 5/204 left elbow/forearm wound after mechanical ground-level fall.  Patient is on Plavix.  Wound was debrided, irrigated and dressed, dressing is remained in place since his discharge.  Denies persistent bleeding or soiled bandages.  Minimal discomfort.  No swelling, redness or other drainage.  No fevers.    REVIEW OF SYSTEMS  See HPI for further details.     PAST MEDICAL HISTORY   has a past medical history of Allergic rhinitis; ASTHMA; Basal cell carcinoma of face (05/2004); Cataract; Colon polyp, hyperplastic (5/04); CVA (cerebral vascular accident) (HCC) (01/2003); Dysphagia (2013); GOUT; HYPOTHYROIDISM (2014); Rosacea; and TIA (12/2000).    SOCIAL HISTORY  Social History     Social History Main Topics   • Smoking status: Former Smoker     Packs/day: 1.00     Years: 10.00     Types: Cigarettes     Quit date: 1/15/1963   • Smokeless tobacco: Never Used      Comment: quit 1963. h/o 1 ppd x 10 years   • Alcohol use Yes      Comment: 7-8 per week   • Drug use: No   • Sexual activity: Yes     Partners: Female       SURGICAL HISTORY   has a past surgical history that includes colonoscopy with polyp (5/2004); tonsillectomy; knee arthroscopy (10/02); and microlaryngoscopy (1/21/2014).    CURRENT MEDICATIONS  Home Medications    **Home medications have not yet been reviewed for this encounter**         ALLERGIES  Allergies   Allergen Reactions   • Penicillins Anaphylaxis       PHYSICAL EXAM  VITAL SIGNS: Pulse (!) 51   Temp 37.1 °C (98.7 °F)   Wt 79.1 kg (174 lb 6.1 oz)   SpO2 99%   BMI 26.51 kg/m²   Pulse ox interpretation: I interpret this pulse ox as normal.  Constitutional: Alert in no apparent distress.  HENT: Normocephalic, atraumatic. Bilateral external ears normal, Nose normal. Moist mucous membranes.     Eyes: Wears dark glasses for history of cataract.  Neck: Normal range of motion, Supple.   Cardiovascular: Normal peripheral perfusion.  Thorax & Lungs: Nonlabored respirations.  Skin: Warm, Dry.  Left elbow dressing taken down, superficial avulsion.  Some granulation tissue noted.  Most Surgicel removed without recurrent bleeding.  No hematoma.  No cellulitis, induration, fluctuance.  No purulent drainage.  Musculoskeletal: Good range of motion in all major joints. No major deformities noted.  No crepitus or deformity or decreased range of motion at left upper extremity, elbow.  2+ radial pulse, strong .  Sensation intact light touch distally.  Neurologic: Alert , ambulates independently.  Psychiatric: Affect normal, Judgment normal, Mood normal.       COURSE & MEDICAL DECISION MAKING  Nursing notes and vital signs were reviewed. (See chart for details)  The patients records were reviewed, history was obtained from the patient;     Medical record review: 5/20 evaluation for left upper extremity wound after mechanical fall.  Per the notes wound was irrigated, debrided of skin flap and Surgicel was placed for hemostasis before Adaptic and remaining dressing.  To follow-up at this facility in 3 days for wound check.    Dressing removed and wound evaluated in seen as described above.  Good hemostasis.  Early granulation tissue noted.  No evidence for cellulitis or abscess.  CMS is intact distally.  Dressing has been replaced with antibiotic ointment, Adaptic, gauze.    10:13 AM discussion with wound care clinic, patient has an appointment now for Thursday 5/24 at 4 PM for further evaluation and continued care.    Patient is stable for discharge at this time, anticipatory guidance provided,to continue home medications, close follow-up is encouraged with primary care as well as wound care clinic as scheduled now in 2 days, and strict ED return instructions have been detailed. Patient is agreeable to the  disposition and plan.    Patient's blood pressure was elevated in the emergency department, and has been referred to primary care for close monitoring.      FINAL IMPRESSION  (S51.802A) Open wound of left forearm, initial encounter  (primary encounter diagnosis)  (Z79.02) Platelet inhibition due to Plavix      Electronically signed by: Elena Walker, 5/22/2018 10:13 AM      This dictation was created using voice recognition software. The accuracy of the dictation is limited to the abilities of the software. I expect there may be some errors of grammar and possibly content. The nursing notes were reviewed and certain aspects of this information were incorporated into this note.

## 2018-05-24 ENCOUNTER — NON-PROVIDER VISIT (OUTPATIENT)
Dept: WOUND CARE | Facility: MEDICAL CENTER | Age: 83
End: 2018-05-24
Attending: EMERGENCY MEDICINE
Payer: MEDICARE

## 2018-05-24 PROCEDURE — 99212 OFFICE O/P EST SF 10 MIN: CPT

## 2018-05-24 PROCEDURE — 97602 WOUND(S) CARE NON-SELECTIVE: CPT

## 2018-05-24 NOTE — CERTIFICATION
Advanced Wound Care  [facility]  [address]  [address]  [place] [address]  [phone] Fax: [phone]      Initial Evaluation  For Certification Period: 05/24/2018 - 08/14/2018   Start of Care:05/24/2018          Referring Physician: Elena Walker MD  Primary Physician: Patrica Garica MD       Consulting Physicians:         Wound(s): Left forearm  [facility]       Subjective:        HPI:  82 year old male presented to the [facility] with a skin tear on his left forearm.  Patient states that he had a fall and scraped his arm while bracing himself.  Patient went to the ED when he could not stop the bleeding, patient is on plavix long-term due to history of TIA.              Pain: 2-3/10 stinging pain with touch           Past Medical History:  Past Medical History:   Diagnosis Date   • Allergic rhinitis    • ASTHMA    • Basal cell carcinoma of face 05/2004    Dr. Lucas   • Cataract    • Colon polyp, hyperplastic 5/04   • CVA (cerebral vascular accident) (HCC) 01/2003    Residual diplopia, Dr. Cleaning   • Dysphagia 2013    Dr. Joseph, vallecular lesion removed   • GOUT    • HYPOTHYROIDISM 2014   • Rosacea     Dr. Lucas   • TIA 12/2000       Current Medications:  Current Outpatient Prescriptions:   •  levothyroxine (SYNTHROID) 150 MCG Tab, Take 150 mcg by mouth Every morning on an empty stomach., Disp: , Rfl:   •  multivitamin (THERAGRAN) Tab, Take 1 Tab by mouth every day., Disp: , Rfl:   •  Probiotic Product (PROBIOTIC PO), Take 1 Cap by mouth every day., Disp: , Rfl:   •  metronidazole (METROGEL) 1 % gel, Apply 1 Application to affected area(s) every day. Apply to nose, Disp: , Rfl:   •  ranitidine (ZANTAC) 300 MG tablet, Take 300 mg by mouth every evening., Disp: , Rfl:   •  doxycycline (VIBRAMYCIN) 100 MG Cap, Take 100 mg by mouth every day. ACNE, Disp: , Rfl:   •  fluticasone (FLONASE) 50 MCG/ACT nasal spray, Spray 2 Sprays in nose every day., Disp: 3 Bottle, Rfl: 1  •   clopidogrel (PLAVIX) 75 MG TABS, Take 1 Tab by mouth every day., Disp: 90 Tab, Rfl: 3  •  FLOVENT  MCG/ACT AERO, INHALE 2 PUFFS BY MOUTH 2TIMES A DAY., Disp: 1 Inhaler, Rfl: 8    Allergies:   Allergies   Allergen Reactions   • Penicillins Anaphylaxis       Past Surgical History:   Past Surgical History:   Procedure Laterality Date   • MICROLARYNGOSCOPY  1/21/2014    Performed by Jimbo Joseph M.D. at SURGERY SAME DAY [facility]   • COLONOSCOPY WITH POLYP  5/2004    Hyperplastic   • KNEE ARTHROSCOPY  10/02    right, Dr. Engle   • TONSILLECTOMY         Social History:   Social History     Social History   • Marital status:      Spouse name: Willa   • Number of children: 3   • Years of education: college     Occupational History   • Retired Retired     Social History Main Topics   • Smoking status: Former Smoker     Packs/day: 1.00     Years: 10.00     Types: Cigarettes     Quit date: 1/15/1963   • Smokeless tobacco: Never Used      Comment: quit 1963. h/o 1 ppd x 10 years   • Alcohol use Yes      Comment: 7-8 per week   • Drug use: No   • Sexual activity: Yes     Partners: Female     Other Topics Concern   • Not on file     Social History Narrative   • No narrative on file             Objective:      Tests and Measures:    Orthotic, protective, supportive devices: none    Fall Risk Assessment (anne marie all that apply with an X):  Completed 05/24/2018   x65 years or older     xFall within the last 2 years, uses   xAmbulatory devices  Loss of protective sensation in feet,   Use of prostethic/orthotic, years    Presence of lower extremity/foot/toe amputation   Taking medication that increases risk (per facility policy)    Interventions Recommended (if any of the above are selected):   xUse of Assistive Device:_______walker_________________   Supervision with ambulation:  Caregiver   Assistance with ambulation:  Caregiver   xHome safety education:  Educational material provided         Wound  Characteristics                                                    Location:  Left forearm Initial Evaluation  Date: 05/24/2018     Tissue Type and %: 100% moist red   Periwound: Intact, fragile, bruising   Drainage: bloody   Exposed structures none   Wound Edges:   open   Odor: none   S&S of Infection:   none   Edema: none   Sensation: intact               Measurements:  Left forearm Initial Evaluation  Date: 05/24/2018     Length (cm) 5.5   Width (cm) 3.5   Depth (cm) 0.1   Area (cm2) 19.25cm2   Tract/undermine none            Procedures:     Debridement : Non-selective blunt debridement with NS moisten gauze   Cleansed with: Normal saline and gauze                                                                         Periwound protected with: skin prep   Primary dressing: Vilma   Secondary Dressing: Silicone adhesive foam   Other: Tubigrib size C     Patient Education:  Patient to keep dressing clean, dry, and intact until next appointment. Educated patient regarding s/s of infection and to increase protein to help with wound healing.    Professional Collaboration: Initial evaluation sent to patient's Primary care provider, Patrica Garcia MD      Assessment:      Wound etiology: trauma    Wound Progress:  Initial Evaluation    Rationale for Treatment:Vilma to provide collagen/cellulose matrix in order to provide matrix and distract MMPs, manage bioburden, absorb exudate, and maintain moist wound environment, also acts as a hemostatics.     Patient tolerance/compliance: Patient tolerated dressing change well and willing to comply with POC    Complicating factors: Age, plavix     Need for ongoing Advanced Wound Care services:Patient requires skilled therapeutic wound care services for product selection, application of product, debridement, close monitoring with clinical assessment for expedite of wound healing.         Plan:      Treatment Plan and Recommendations:  Diagnosis/ICD10:   S51.802A (ICD-10-CM) - Open  wound of left forearm, initial encounter   Z79.02 (ICD-10-CM) - Platelet inhibition due to Plavix         Procedures/CPT: CSWD    Frequency: 1x/weekly      Treatment Goals: STG 2 Weeks  LTG 4 Weeks   Granulation Tissue: Resolution of wound %   Decrease Necrotic Tissue to:  %   Wound Phase:      Decrease Size by:  %   Periwound:      Decrease tracts/undermining by:  %   Decrease Pain:          At the time of each visit a thorough assessment of the patient is completed to assure the  appropriateness of our plan of care.  The dressings or modalities may need to be adapted   from the original plan to address any significant changes in the wound environment.          Clinician Signature:_______________________________Date__________________      Physician Signature:______________________________Date:__________________

## 2018-05-28 ENCOUNTER — APPOINTMENT (OUTPATIENT)
Dept: WOUND CARE | Facility: MEDICAL CENTER | Age: 83
End: 2018-05-28
Attending: EMERGENCY MEDICINE
Payer: MEDICARE

## 2018-05-31 ENCOUNTER — NON-PROVIDER VISIT (OUTPATIENT)
Dept: WOUND CARE | Facility: MEDICAL CENTER | Age: 83
End: 2018-05-31
Attending: EMERGENCY MEDICINE
Payer: MEDICARE

## 2018-05-31 PROCEDURE — 97602 WOUND(S) CARE NON-SELECTIVE: CPT

## 2018-05-31 NOTE — WOUND TEAM
Advanced Wound Care  [facility]  [address]  [address]  [place] [address]  [phone] Fax: [phone]      Encounter Note  For Certification Period: 05/24/2018 - 08/14/2018   Start of Care:05/24/2018          Referring Physician: Elena Walker MD  Primary Physician: Patrica Garcia MD       Consulting Physicians:         Wound(s): Left forearm  [facility]       Subjective:        HPI:  82 year old male presented to the [facility] with a skin tear on his left forearm.  Patient states that he had a fall and scraped his arm while bracing himself.  Patient went to the ED when he could not stop the bleeding, patient is on plavix long-term due to history of TIA.              Pain: 2-3/10 stinging pain with touch           Allergies:   Allergies   Allergen Reactions   • Penicillins Anaphylaxis           Objective:      Tests and Measures:    Orthotic, protective, supportive devices: none    Fall Risk Assessment (anne marie all that apply with an X):  Completed 05/24/2018       Wound Characteristics                                                    Location:  Left forearm Initial Evaluation  Date: 05/24/2018   Encounter Date: 05/31/2018   Tissue Type and %: 100% moist red 100% moist pink   Periwound: Intact, fragile, bruising Intact, fragile, bruising   Drainage: bloody Moderate serosanguinous    Exposed structures none None   Wound Edges:   open open   Odor: none none   S&S of Infection:   none none   Edema: none none   Sensation: intact intact               Measurements:  Left forearm Initial Evaluation  Date: 05/24/2018   Encounter Date: 05/31/2018   Length (cm) 5.5 4.1   Width (cm) 3.5 2.5   Depth (cm) 0.1 0.1   Area (cm2) 19.25cm2 10.25cm2   Tract/undermine none None            Procedures:     Debridement : Non-selective blunt debridement with NS moisten gauze   Cleansed with: Normal saline and gauze                                                                         Periwound  protected with: skin prep   Primary dressing: Vilma   Secondary Dressing: Silicone adhesive foam   Other: Tubigrib size E     Patient Education:  POC discussed with patient. Patient to keep dressing clean, dry, and intact until next appointment. Educated patient regarding s/s of infection and to increase protein to help with wound healing.      Professional Collaboration:       Assessment:      Wound etiology: trauma    Wound Progress:  Approximately 47% smaller in size in area    Rationale for Treatment:Vilma to provide collagen/cellulose matrix in order to provide matrix and distract MMPs, manage bioburden, absorb exudate, and maintain moist wound environment, also acts as a hemostatics.     Patient tolerance/compliance: Patient tolerated dressing change well and willing to comply with POC    Complicating factors: Age, plavix     Need for ongoing Advanced Wound Care services:Patient requires skilled therapeutic wound care services for product selection, application of product, debridement, close monitoring with clinical assessment for expedite of wound healing.         Plan:      Treatment Plan and Recommendations:  Diagnosis/ICD10:   S51.802A (ICD-10-CM) - Open wound of left forearm, initial encounter   Z79.02 (ICD-10-CM) - Platelet inhibition due to Plavix         Procedures/CPT: CSWD    Frequency: 1x/weekly      Treatment Goals: STG 2 Weeks  LTG 4 Weeks   Granulation Tissue: Resolution of wound %   Decrease Necrotic Tissue to:  %   Wound Phase:      Decrease Size by:  %   Periwound:      Decrease tracts/undermining by:  %   Decrease Pain:          At the time of each visit a thorough assessment of the patient is completed to assure the  appropriateness of our plan of care.  The dressings or modalities may need to be adapted   from the original plan to address any significant changes in the wound environment.          Clinician Signature:_______________________________Date__________________      Physician  Signature:______________________________Date:__________________

## 2018-06-04 ENCOUNTER — APPOINTMENT (OUTPATIENT)
Dept: WOUND CARE | Facility: MEDICAL CENTER | Age: 83
End: 2018-06-04
Attending: EMERGENCY MEDICINE
Payer: MEDICARE

## 2018-06-07 ENCOUNTER — NON-PROVIDER VISIT (OUTPATIENT)
Dept: WOUND CARE | Facility: MEDICAL CENTER | Age: 83
End: 2018-06-07
Attending: EMERGENCY MEDICINE
Payer: MEDICARE

## 2018-06-07 PROCEDURE — 97602 WOUND(S) CARE NON-SELECTIVE: CPT

## 2018-06-07 NOTE — CERTIFICATION
Advanced Wound Care   [facility]   [address]   [address]   [place] [address]   [phone] Fax: [phone]     Discharge Note        Referring Physician:  Elena Walker M.D.  Wound Etiology: Patrica Garcia M.D.  Wound location: Left forearm  ICD-9: S51.802A (ICD-10-CM) - Open wound of left forearm, initial encounter  Z79.02 (ICD-10-CM) - Platelet inhibition due to Plavix  Date of Discharge: 06/07/2018     Assessment:  Discharge patient at this time secondary to patient decision. Pt's wound is nearly resolved however advised pt to see us at least one more time for skin check and to make sure of wound resolution, however pt is adamant about not coming back to [facility]. Instructed pt to leave dressing from today on for a week and continue gentle care on wound site. Pt verbalized understanding.    Thank you for the referral and the opportunity to treat your patient.        Clinician Signature: _____________________________ Date:_______________     Please send certification to PCP, Patrica Garcia M.D.

## 2018-06-07 NOTE — WOUND TEAM
Advanced Wound Care  [facility]  [address]  [address]  [place] [address]  [phone] Fax: [phone]      Encounter Note  For Certification Period: 05/24/2018 - 08/14/2018   Start of Care:05/24/2018          Referring Physician: Elena Walker MD  Primary Physician: Patrica Garcia MD       Consulting Physicians:         Wound(s): Left forearm  [facility]       Subjective:        HPI:  82 year old male presented to the [facility] with a skin tear on his left forearm.  Patient states that he had a fall and scraped his arm while bracing himself.  Patient went to the ED when he could not stop the bleeding, patient is on plavix long-term due to history of TIA.              Pain: 2-3/10 stinging pain with touch           Allergies:   Allergies   Allergen Reactions   • Penicillins Anaphylaxis           Objective:      Tests and Measures:    Orthotic, protective, supportive devices: none    Fall Risk Assessment (anne marie all that apply with an X):  Completed 05/24/2018       Wound Characteristics                                                    Location:  Left forearm Initial Evaluation  Date: 05/24/2018   Encounter Date: 06/07/2018   Tissue Type and %: 100% moist red Two small openings remain-100% moist pink   Periwound: Intact, fragile, bruising Intact, fragile   Drainage: bloody Moderate serosanguinous    Exposed structures none None   Wound Edges:   open open   Odor: none none   S&S of Infection:   none none   Edema: none none   Sensation: intact intact               Measurements:  Left forearm Initial Evaluation  Date: 05/24/2018   Encounter Date: 06/07/2018  Prox / Dist   Length (cm) 5.5 0.1 / 0.2   Width (cm) 3.5 0.1 / 0.2   Depth (cm) 0.1 0.1 / 0.1   Area (cm2) 19.25cm2 0.01 / 0.04 cm2   Tract/undermine none None                Procedures:     Debridement : Non-selective blunt debridement with NS moisten gauze   Cleansed with: Normal saline and gauze                                                                          Periwound protected with: No sting skin prep   Primary dressing: hydrocolloid   Secondary Dressing: small tegaderm x2 for water resistance   Other: Pt refuses tubigrip E     Patient Education: POC discussed with patient. Pt states he does not want to come back to [facility] as his own method to apply neosporin and applying large bandaid has worked in past. Discussed rationale for hydrocolloid and tegaderm, pt agreeable. Instructed patient to keep dressing clean, dry, and intact for a week also instructed pt new fragile epithelium needs continued gentle care. Reviewed s/s of infection and when to go to ER. Pt verbalizes understanding and agrees to all.    Professional Collaboration: None today.      Assessment:      Wound etiology: trauma    Wound Progress: Nearly resolved    Rationale for Treatment:Vilma to provide collagen/cellulose matrix in order to provide matrix and distract MMPs, manage bioburden, absorb exudate, and maintain moist wound environment, also acts as a hemostatics.     Patient tolerance/compliance: Patient tolerated dressing change well and willing to comply with POC    Complicating factors: Age, plavix     Need for ongoing Advanced Wound Care services:Patient requires skilled therapeutic wound care services for product selection, application of product, debridement, close monitoring with clinical assessment for expedite of wound healing.     Plan:      Treatment Plan and Recommendations:  Diagnosis/ICD10:   S51.802A (ICD-10-CM) - Open wound of left forearm, initial encounter   Z79.02 (ICD-10-CM) - Platelet inhibition due to Plavix     Procedures/CPT:     Frequency: Does not wish to come back    Treatment Goals: STG 2 Weeks  LTG 4 Weeks   Granulation Tissue: Resolution of wound %   Decrease Necrotic Tissue to:  %   Wound Phase:      Decrease Size by:  %   Periwound:      Decrease tracts/undermining by:  %   Decrease Pain:          At the time of each visit a  thorough assessment of the patient is completed to assure the  appropriateness of our plan of care.  The dressings or modalities may need to be adapted   from the original plan to address any significant changes in the wound environment.          Clinician Signature:_______________________________Date__________________      Physician Signature:______________________________Date:__________________

## 2018-06-11 ENCOUNTER — APPOINTMENT (OUTPATIENT)
Dept: WOUND CARE | Facility: MEDICAL CENTER | Age: 83
End: 2018-06-11
Attending: EMERGENCY MEDICINE
Payer: MEDICARE

## 2018-06-14 ENCOUNTER — APPOINTMENT (OUTPATIENT)
Dept: WOUND CARE | Facility: MEDICAL CENTER | Age: 83
End: 2018-06-14
Attending: EMERGENCY MEDICINE
Payer: MEDICARE

## 2018-06-18 ENCOUNTER — APPOINTMENT (OUTPATIENT)
Dept: WOUND CARE | Facility: MEDICAL CENTER | Age: 83
End: 2018-06-18
Attending: EMERGENCY MEDICINE
Payer: MEDICARE

## 2018-06-21 ENCOUNTER — APPOINTMENT (OUTPATIENT)
Dept: WOUND CARE | Facility: MEDICAL CENTER | Age: 83
End: 2018-06-21
Attending: EMERGENCY MEDICINE
Payer: MEDICARE

## 2019-01-03 DIAGNOSIS — Z01.812 PRE-OPERATIVE LABORATORY EXAMINATION: ICD-10-CM

## 2019-01-03 DIAGNOSIS — Z01.810 PRE-OPERATIVE CARDIOVASCULAR EXAMINATION: ICD-10-CM

## 2019-01-03 LAB
ANION GAP SERPL CALC-SCNC: 7 MMOL/L (ref 0–11.9)
APPEARANCE UR: CLEAR
BILIRUB UR QL STRIP.AUTO: NEGATIVE
BUN SERPL-MCNC: 20 MG/DL (ref 8–22)
CALCIUM SERPL-MCNC: 10.8 MG/DL (ref 8.5–10.5)
CHLORIDE SERPL-SCNC: 105 MMOL/L (ref 96–112)
CO2 SERPL-SCNC: 27 MMOL/L (ref 20–33)
COLOR UR: YELLOW
CREAT SERPL-MCNC: 0.86 MG/DL (ref 0.5–1.4)
ERYTHROCYTE [DISTWIDTH] IN BLOOD BY AUTOMATED COUNT: 48.9 FL (ref 35.9–50)
EST. AVERAGE GLUCOSE BLD GHB EST-MCNC: 123 MG/DL
GLUCOSE SERPL-MCNC: 96 MG/DL (ref 65–99)
GLUCOSE UR STRIP.AUTO-MCNC: NEGATIVE MG/DL
HBA1C MFR BLD: 5.9 % (ref 0–5.6)
HCT VFR BLD AUTO: 52.2 % (ref 42–52)
HGB BLD-MCNC: 17.3 G/DL (ref 14–18)
KETONES UR STRIP.AUTO-MCNC: NEGATIVE MG/DL
LEUKOCYTE ESTERASE UR QL STRIP.AUTO: NEGATIVE
MCH RBC QN AUTO: 30.9 PG (ref 27–33)
MCHC RBC AUTO-ENTMCNC: 33.1 G/DL (ref 33.7–35.3)
MCV RBC AUTO: 93.4 FL (ref 81.4–97.8)
MICRO URNS: NORMAL
NITRITE UR QL STRIP.AUTO: NEGATIVE
PH UR STRIP.AUTO: 6 [PH]
PLATELET # BLD AUTO: 251 K/UL (ref 164–446)
PMV BLD AUTO: 9.9 FL (ref 9–12.9)
POTASSIUM SERPL-SCNC: 4.3 MMOL/L (ref 3.6–5.5)
PROT UR QL STRIP: NEGATIVE MG/DL
RBC # BLD AUTO: 5.59 M/UL (ref 4.7–6.1)
RBC UR QL AUTO: NEGATIVE
SCCMEC + MECA PNL NOSE NAA+PROBE: NEGATIVE
SCCMEC + MECA PNL NOSE NAA+PROBE: NEGATIVE
SODIUM SERPL-SCNC: 139 MMOL/L (ref 135–145)
SP GR UR STRIP.AUTO: 1.01
UROBILINOGEN UR STRIP.AUTO-MCNC: 0.2 MG/DL
WBC # BLD AUTO: 9.2 K/UL (ref 4.8–10.8)

## 2019-01-03 PROCEDURE — 85027 COMPLETE CBC AUTOMATED: CPT

## 2019-01-03 PROCEDURE — 87640 STAPH A DNA AMP PROBE: CPT | Mod: XU

## 2019-01-03 PROCEDURE — 87641 MR-STAPH DNA AMP PROBE: CPT

## 2019-01-03 PROCEDURE — 81003 URINALYSIS AUTO W/O SCOPE: CPT

## 2019-01-03 PROCEDURE — 83036 HEMOGLOBIN GLYCOSYLATED A1C: CPT

## 2019-01-03 PROCEDURE — 36415 COLL VENOUS BLD VENIPUNCTURE: CPT

## 2019-01-03 PROCEDURE — 80048 BASIC METABOLIC PNL TOTAL CA: CPT

## 2019-01-03 PROCEDURE — 87086 URINE CULTURE/COLONY COUNT: CPT

## 2019-01-03 RX ORDER — AZELAIC ACID 0.15 G/G
1 GEL TOPICAL DAILY
COMMUNITY

## 2019-01-03 RX ORDER — ALBUTEROL SULFATE 90 UG/1
2 AEROSOL, METERED RESPIRATORY (INHALATION) EVERY 6 HOURS PRN
COMMUNITY
End: 2023-11-26

## 2019-01-03 NOTE — OR NURSING
"Pre-admit appointment completed. \"Preparing for your procedure\" sheet given to pt along with verbal and written instructions. Pt instructed to continue regularly prescribed medications through the day before surgery. Pt instructed to take the following medications the day of surgery with a sip of water, per anesthesia protocol; flovent inhaler, synthroid, and if needed-albuterol MDI.    Pt to bring inhalers DOS. Pt states he will bring albuterol MDI if he has a current one.     Pt states he is to hold plavix starting tomorrow and then will be bridged with shots(does not know name) per his PCP instructions. Pt states saw PCP Monday and EKG was done. Called PCP office and she states information faxed to Dr Meyer to get copies.    BONNIE Good at Dr Meyer' office to please fax EKG and clearance if have.     "

## 2019-01-03 NOTE — DIETARY
DISCHARGE PLANNING NOTE - TOTAL JOINT     Procedure: Procedure(s):  KNEE ARTHROPLASTY TOTAL  Procedure Date: 1/14/2019  Insurance:  Payor: MEDICARE / Plan: MEDICARE PART A & B / Product Type: *No Product type* /   Equipment currently available at home? cane, crutches and front-wheel walker  Steps into the home? 2 in back and 3 in front  Steps within the home? 2 story, pt must go upstairs for meals etc.  Has split flights with a landing and rails.  Toilet height? ADA  Type of shower? walk-in shower with hose  Who will be with you during your recovery? Spouse-Willa  Is Outpatient Physical Therapy set up after surgery? No will discuss with patient  Did you take the Total Joint Class and where? Yes, at [facility] on 1/10/19     Plan: Met with patient during preadmission appointment.  Reviewed Equipment Resource list and provided a copy to the patient with Care Chest recommendation.  Provided and reviewed Home Safety Checklist.  Quiet Hours form given and reviewed.  There are no identified discharge needs at this time.  Anticipate discharge home with family.  No DME orders at this time.

## 2019-01-05 LAB
BACTERIA UR CULT: NORMAL
SIGNIFICANT IND 70042: NORMAL
SITE SITE: NORMAL
SOURCE SOURCE: NORMAL

## 2019-01-14 ENCOUNTER — HOSPITAL ENCOUNTER (OUTPATIENT)
Facility: MEDICAL CENTER | Age: 84
End: 2019-01-15
Attending: ORTHOPAEDIC SURGERY | Admitting: ORTHOPAEDIC SURGERY
Payer: MEDICARE

## 2019-01-14 ENCOUNTER — APPOINTMENT (OUTPATIENT)
Dept: RADIOLOGY | Facility: MEDICAL CENTER | Age: 84
End: 2019-01-14
Attending: ORTHOPAEDIC SURGERY
Payer: MEDICARE

## 2019-01-14 DIAGNOSIS — Z86.73 HISTORY OF CVA (CEREBROVASCULAR ACCIDENT): ICD-10-CM

## 2019-01-14 LAB — GLUCOSE BLD-MCNC: 94 MG/DL (ref 65–99)

## 2019-01-14 PROCEDURE — 160029 HCHG SURGERY MINUTES - 1ST 30 MINS LEVEL 4: Performed by: ORTHOPAEDIC SURGERY

## 2019-01-14 PROCEDURE — 160009 HCHG ANES TIME/MIN: Performed by: ORTHOPAEDIC SURGERY

## 2019-01-14 PROCEDURE — 73560 X-RAY EXAM OF KNEE 1 OR 2: CPT | Mod: LT

## 2019-01-14 PROCEDURE — 82962 GLUCOSE BLOOD TEST: CPT

## 2019-01-14 PROCEDURE — 501838 HCHG SUTURE GENERAL: Performed by: ORTHOPAEDIC SURGERY

## 2019-01-14 PROCEDURE — 700105 HCHG RX REV CODE 258: Performed by: ORTHOPAEDIC SURGERY

## 2019-01-14 PROCEDURE — 160035 HCHG PACU - 1ST 60 MINS PHASE I: Performed by: ORTHOPAEDIC SURGERY

## 2019-01-14 PROCEDURE — 160002 HCHG RECOVERY MINUTES (STAT): Performed by: ORTHOPAEDIC SURGERY

## 2019-01-14 PROCEDURE — 94760 N-INVAS EAR/PLS OXIMETRY 1: CPT

## 2019-01-14 PROCEDURE — 160048 HCHG OR STATISTICAL LEVEL 1-5: Performed by: ORTHOPAEDIC SURGERY

## 2019-01-14 PROCEDURE — 700112 HCHG RX REV CODE 229: Performed by: ORTHOPAEDIC SURGERY

## 2019-01-14 PROCEDURE — 700101 HCHG RX REV CODE 250

## 2019-01-14 PROCEDURE — 502000 HCHG MISC OR IMPLANTS RC 0278: Performed by: ORTHOPAEDIC SURGERY

## 2019-01-14 PROCEDURE — A9270 NON-COVERED ITEM OR SERVICE: HCPCS | Performed by: ORTHOPAEDIC SURGERY

## 2019-01-14 PROCEDURE — 700111 HCHG RX REV CODE 636 W/ 250 OVERRIDE (IP)

## 2019-01-14 PROCEDURE — 700111 HCHG RX REV CODE 636 W/ 250 OVERRIDE (IP): Performed by: ORTHOPAEDIC SURGERY

## 2019-01-14 PROCEDURE — 96365 THER/PROPH/DIAG IV INF INIT: CPT

## 2019-01-14 PROCEDURE — 160036 HCHG PACU - EA ADDL 30 MINS PHASE I: Performed by: ORTHOPAEDIC SURGERY

## 2019-01-14 PROCEDURE — L8699 PROSTHETIC IMPLANT NOS: HCPCS | Performed by: ORTHOPAEDIC SURGERY

## 2019-01-14 PROCEDURE — G0378 HOSPITAL OBSERVATION PER HR: HCPCS

## 2019-01-14 PROCEDURE — A9270 NON-COVERED ITEM OR SERVICE: HCPCS | Performed by: ANESTHESIOLOGY

## 2019-01-14 PROCEDURE — 160041 HCHG SURGERY MINUTES - EA ADDL 1 MIN LEVEL 4: Performed by: ORTHOPAEDIC SURGERY

## 2019-01-14 PROCEDURE — 302135 SEQUENTIAL COMPRESSION MACHINE: Performed by: ORTHOPAEDIC SURGERY

## 2019-01-14 PROCEDURE — 96366 THER/PROPH/DIAG IV INF ADDON: CPT

## 2019-01-14 PROCEDURE — 700102 HCHG RX REV CODE 250 W/ 637 OVERRIDE(OP): Performed by: ANESTHESIOLOGY

## 2019-01-14 PROCEDURE — 700102 HCHG RX REV CODE 250 W/ 637 OVERRIDE(OP): Performed by: ORTHOPAEDIC SURGERY

## 2019-01-14 PROCEDURE — 160022 HCHG BLOCK: Performed by: ORTHOPAEDIC SURGERY

## 2019-01-14 DEVICE — IMPLANTABLE DEVICE: Type: IMPLANTABLE DEVICE | Site: KNEE | Status: FUNCTIONAL

## 2019-01-14 DEVICE — IMPLANT GNS II CMT TIB SIZE 6 LEFT (1EA): Type: IMPLANTABLE DEVICE | Site: KNEE | Status: FUNCTIONAL

## 2019-01-14 DEVICE — IMPLANT GNS II C/R FEM SIZE 6 LEFT: Type: IMPLANTABLE DEVICE | Site: KNEE | Status: FUNCTIONAL

## 2019-01-14 DEVICE — IMPLANT GII OVAL RESURFACING PAT 32MM (1EA): Type: IMPLANTABLE DEVICE | Site: KNEE | Status: FUNCTIONAL

## 2019-01-14 DEVICE — CEMENT ORTHOPEDIC HV US  (10/PK): Type: IMPLANTABLE DEVICE | Site: KNEE | Status: FUNCTIONAL

## 2019-01-14 RX ORDER — SODIUM CHLORIDE, SODIUM LACTATE, POTASSIUM CHLORIDE, CALCIUM CHLORIDE 600; 310; 30; 20 MG/100ML; MG/100ML; MG/100ML; MG/100ML
INJECTION, SOLUTION INTRAVENOUS ONCE
Status: COMPLETED | OUTPATIENT
Start: 2019-01-14 | End: 2019-01-14

## 2019-01-14 RX ORDER — AMOXICILLIN 250 MG
1 CAPSULE ORAL
Status: DISCONTINUED | OUTPATIENT
Start: 2019-01-14 | End: 2019-01-15 | Stop reason: HOSPADM

## 2019-01-14 RX ORDER — HALOPERIDOL 5 MG/ML
1 INJECTION INTRAMUSCULAR
Status: DISCONTINUED | OUTPATIENT
Start: 2019-01-14 | End: 2019-01-14 | Stop reason: HOSPADM

## 2019-01-14 RX ORDER — LEVOTHYROXINE SODIUM 0.07 MG/1
150 TABLET ORAL
Status: DISCONTINUED | OUTPATIENT
Start: 2019-01-15 | End: 2019-01-15 | Stop reason: HOSPADM

## 2019-01-14 RX ORDER — CEFAZOLIN SODIUM 1 G/3ML
INJECTION, POWDER, FOR SOLUTION INTRAMUSCULAR; INTRAVENOUS
Status: DISCONTINUED | OUTPATIENT
Start: 2019-01-14 | End: 2019-01-14 | Stop reason: HOSPADM

## 2019-01-14 RX ORDER — HYDRALAZINE HYDROCHLORIDE 20 MG/ML
5 INJECTION INTRAMUSCULAR; INTRAVENOUS
Status: DISCONTINUED | OUTPATIENT
Start: 2019-01-14 | End: 2019-01-14 | Stop reason: HOSPADM

## 2019-01-14 RX ORDER — KETOROLAC TROMETHAMINE 30 MG/ML
INJECTION, SOLUTION INTRAMUSCULAR; INTRAVENOUS
Status: DISCONTINUED | OUTPATIENT
Start: 2019-01-14 | End: 2019-01-14 | Stop reason: HOSPADM

## 2019-01-14 RX ORDER — ACETAMINOPHEN 500 MG
1000 TABLET ORAL EVERY 6 HOURS
Status: DISCONTINUED | OUTPATIENT
Start: 2019-01-14 | End: 2019-01-15 | Stop reason: HOSPADM

## 2019-01-14 RX ORDER — MEPERIDINE HYDROCHLORIDE 25 MG/ML
12.5 INJECTION INTRAMUSCULAR; INTRAVENOUS; SUBCUTANEOUS
Status: DISCONTINUED | OUTPATIENT
Start: 2019-01-14 | End: 2019-01-14 | Stop reason: HOSPADM

## 2019-01-14 RX ORDER — ROPIVACAINE HYDROCHLORIDE 5 MG/ML
INJECTION, SOLUTION EPIDURAL; INFILTRATION; PERINEURAL
Status: DISCONTINUED | OUTPATIENT
Start: 2019-01-14 | End: 2019-01-14 | Stop reason: HOSPADM

## 2019-01-14 RX ORDER — CELECOXIB 200 MG/1
400 CAPSULE ORAL ONCE
Status: COMPLETED | OUTPATIENT
Start: 2019-01-14 | End: 2019-01-14

## 2019-01-14 RX ORDER — DEXTROSE, SODIUM CHLORIDE, SODIUM LACTATE, POTASSIUM CHLORIDE, AND CALCIUM CHLORIDE 5; .6; .31; .03; .02 G/100ML; G/100ML; G/100ML; G/100ML; G/100ML
INJECTION, SOLUTION INTRAVENOUS CONTINUOUS
Status: DISCONTINUED | OUTPATIENT
Start: 2019-01-14 | End: 2019-01-15 | Stop reason: HOSPADM

## 2019-01-14 RX ORDER — METRONIDAZOLE 10 MG/G
1 GEL TOPICAL DAILY
Status: DISCONTINUED | OUTPATIENT
Start: 2019-01-15 | End: 2019-01-15 | Stop reason: HOSPADM

## 2019-01-14 RX ORDER — RANITIDINE 300 MG/1
300 TABLET ORAL EVERY EVENING
Status: DISCONTINUED | OUTPATIENT
Start: 2019-01-14 | End: 2019-01-14

## 2019-01-14 RX ORDER — OXYCODONE HYDROCHLORIDE 10 MG/1
10 TABLET ORAL
Status: DISCONTINUED | OUTPATIENT
Start: 2019-01-14 | End: 2019-01-15 | Stop reason: HOSPADM

## 2019-01-14 RX ORDER — HALOPERIDOL 5 MG/ML
1 INJECTION INTRAMUSCULAR EVERY 6 HOURS PRN
Status: DISCONTINUED | OUTPATIENT
Start: 2019-01-14 | End: 2019-01-15 | Stop reason: HOSPADM

## 2019-01-14 RX ORDER — HYDROMORPHONE HYDROCHLORIDE 1 MG/ML
0.1 INJECTION, SOLUTION INTRAMUSCULAR; INTRAVENOUS; SUBCUTANEOUS
Status: DISCONTINUED | OUTPATIENT
Start: 2019-01-14 | End: 2019-01-14 | Stop reason: HOSPADM

## 2019-01-14 RX ORDER — SCOLOPAMINE TRANSDERMAL SYSTEM 1 MG/1
1 PATCH, EXTENDED RELEASE TRANSDERMAL
Status: DISCONTINUED | OUTPATIENT
Start: 2019-01-14 | End: 2019-01-15 | Stop reason: HOSPADM

## 2019-01-14 RX ORDER — METOPROLOL TARTRATE 1 MG/ML
1 INJECTION, SOLUTION INTRAVENOUS
Status: DISCONTINUED | OUTPATIENT
Start: 2019-01-14 | End: 2019-01-14 | Stop reason: HOSPADM

## 2019-01-14 RX ORDER — FLUTICASONE PROPIONATE 50 MCG
2 SPRAY, SUSPENSION (ML) NASAL DAILY
Status: DISCONTINUED | OUTPATIENT
Start: 2019-01-15 | End: 2019-01-15 | Stop reason: HOSPADM

## 2019-01-14 RX ORDER — HYDROMORPHONE HYDROCHLORIDE 1 MG/ML
0.2 INJECTION, SOLUTION INTRAMUSCULAR; INTRAVENOUS; SUBCUTANEOUS
Status: DISCONTINUED | OUTPATIENT
Start: 2019-01-14 | End: 2019-01-14 | Stop reason: HOSPADM

## 2019-01-14 RX ORDER — EPINEPHRINE 1 MG/ML(1)
AMPUL (ML) INJECTION
Status: DISCONTINUED | OUTPATIENT
Start: 2019-01-14 | End: 2019-01-14 | Stop reason: HOSPADM

## 2019-01-14 RX ORDER — HYDROMORPHONE HYDROCHLORIDE 1 MG/ML
0.5 INJECTION, SOLUTION INTRAMUSCULAR; INTRAVENOUS; SUBCUTANEOUS
Status: DISCONTINUED | OUTPATIENT
Start: 2019-01-14 | End: 2019-01-15 | Stop reason: HOSPADM

## 2019-01-14 RX ORDER — DEXAMETHASONE SODIUM PHOSPHATE 4 MG/ML
4 INJECTION, SOLUTION INTRA-ARTICULAR; INTRALESIONAL; INTRAMUSCULAR; INTRAVENOUS; SOFT TISSUE
Status: DISCONTINUED | OUTPATIENT
Start: 2019-01-14 | End: 2019-01-15 | Stop reason: HOSPADM

## 2019-01-14 RX ORDER — DIPHENHYDRAMINE HYDROCHLORIDE 50 MG/ML
25 INJECTION INTRAMUSCULAR; INTRAVENOUS EVERY 6 HOURS PRN
Status: DISCONTINUED | OUTPATIENT
Start: 2019-01-14 | End: 2019-01-15 | Stop reason: HOSPADM

## 2019-01-14 RX ORDER — SODIUM CHLORIDE, SODIUM LACTATE, POTASSIUM CHLORIDE, CALCIUM CHLORIDE 600; 310; 30; 20 MG/100ML; MG/100ML; MG/100ML; MG/100ML
INJECTION, SOLUTION INTRAVENOUS CONTINUOUS
Status: DISCONTINUED | OUTPATIENT
Start: 2019-01-14 | End: 2019-01-14 | Stop reason: HOSPADM

## 2019-01-14 RX ORDER — CLOPIDOGREL BISULFATE 75 MG/1
75 TABLET ORAL DAILY
Status: DISCONTINUED | OUTPATIENT
Start: 2019-01-14 | End: 2019-01-15 | Stop reason: HOSPADM

## 2019-01-14 RX ORDER — DOCUSATE SODIUM 100 MG/1
100 CAPSULE, LIQUID FILLED ORAL 2 TIMES DAILY
Status: DISCONTINUED | OUTPATIENT
Start: 2019-01-14 | End: 2019-01-15 | Stop reason: HOSPADM

## 2019-01-14 RX ORDER — OXYCODONE HCL 5 MG/5 ML
5 SOLUTION, ORAL ORAL
Status: DISCONTINUED | OUTPATIENT
Start: 2019-01-14 | End: 2019-01-14 | Stop reason: HOSPADM

## 2019-01-14 RX ORDER — HYDROMORPHONE HYDROCHLORIDE 1 MG/ML
0.4 INJECTION, SOLUTION INTRAMUSCULAR; INTRAVENOUS; SUBCUTANEOUS
Status: DISCONTINUED | OUTPATIENT
Start: 2019-01-14 | End: 2019-01-14 | Stop reason: HOSPADM

## 2019-01-14 RX ORDER — POLYETHYLENE GLYCOL 3350 17 G/17G
1 POWDER, FOR SOLUTION ORAL 2 TIMES DAILY PRN
Status: DISCONTINUED | OUTPATIENT
Start: 2019-01-14 | End: 2019-01-15 | Stop reason: HOSPADM

## 2019-01-14 RX ORDER — DIPHENHYDRAMINE HCL 25 MG
25 TABLET ORAL NIGHTLY PRN
Status: DISCONTINUED | OUTPATIENT
Start: 2019-01-15 | End: 2019-01-15 | Stop reason: HOSPADM

## 2019-01-14 RX ORDER — AMOXICILLIN 250 MG
1 CAPSULE ORAL NIGHTLY
Status: DISCONTINUED | OUTPATIENT
Start: 2019-01-14 | End: 2019-01-15 | Stop reason: HOSPADM

## 2019-01-14 RX ORDER — FLUTICASONE PROPIONATE 220 UG/1
1 AEROSOL, METERED RESPIRATORY (INHALATION) EVERY 12 HOURS
Status: DISCONTINUED | OUTPATIENT
Start: 2019-01-14 | End: 2019-01-15 | Stop reason: HOSPADM

## 2019-01-14 RX ORDER — BISACODYL 10 MG
10 SUPPOSITORY, RECTAL RECTAL
Status: DISCONTINUED | OUTPATIENT
Start: 2019-01-14 | End: 2019-01-15 | Stop reason: HOSPADM

## 2019-01-14 RX ORDER — ONDANSETRON 2 MG/ML
4 INJECTION INTRAMUSCULAR; INTRAVENOUS EVERY 4 HOURS PRN
Status: DISCONTINUED | OUTPATIENT
Start: 2019-01-14 | End: 2019-01-15 | Stop reason: HOSPADM

## 2019-01-14 RX ORDER — ENEMA 19; 7 G/133ML; G/133ML
1 ENEMA RECTAL
Status: DISCONTINUED | OUTPATIENT
Start: 2019-01-14 | End: 2019-01-15 | Stop reason: HOSPADM

## 2019-01-14 RX ORDER — ACETAMINOPHEN 500 MG
1000 TABLET ORAL ONCE
Status: COMPLETED | OUTPATIENT
Start: 2019-01-14 | End: 2019-01-14

## 2019-01-14 RX ORDER — DIPHENHYDRAMINE HYDROCHLORIDE 50 MG/ML
12.5 INJECTION INTRAMUSCULAR; INTRAVENOUS
Status: DISCONTINUED | OUTPATIENT
Start: 2019-01-14 | End: 2019-01-14 | Stop reason: HOSPADM

## 2019-01-14 RX ORDER — GABAPENTIN 300 MG/1
300 CAPSULE ORAL ONCE
Status: COMPLETED | OUTPATIENT
Start: 2019-01-14 | End: 2019-01-14

## 2019-01-14 RX ORDER — FAMOTIDINE 20 MG/1
40 TABLET, FILM COATED ORAL EVERY EVENING
Status: DISCONTINUED | OUTPATIENT
Start: 2019-01-14 | End: 2019-01-15 | Stop reason: HOSPADM

## 2019-01-14 RX ORDER — ALBUTEROL SULFATE 90 UG/1
2 AEROSOL, METERED RESPIRATORY (INHALATION) EVERY 6 HOURS PRN
Status: DISCONTINUED | OUTPATIENT
Start: 2019-01-14 | End: 2019-01-15 | Stop reason: HOSPADM

## 2019-01-14 RX ORDER — OXYCODONE HCL 10 MG/1
10 TABLET, FILM COATED, EXTENDED RELEASE ORAL ONCE
Status: COMPLETED | OUTPATIENT
Start: 2019-01-14 | End: 2019-01-14

## 2019-01-14 RX ORDER — OXYCODONE HYDROCHLORIDE 5 MG/1
5 TABLET ORAL
Status: DISCONTINUED | OUTPATIENT
Start: 2019-01-14 | End: 2019-01-15 | Stop reason: HOSPADM

## 2019-01-14 RX ORDER — OXYCODONE HCL 5 MG/5 ML
10 SOLUTION, ORAL ORAL
Status: DISCONTINUED | OUTPATIENT
Start: 2019-01-14 | End: 2019-01-14 | Stop reason: HOSPADM

## 2019-01-14 RX ORDER — ONDANSETRON 2 MG/ML
4 INJECTION INTRAMUSCULAR; INTRAVENOUS
Status: DISCONTINUED | OUTPATIENT
Start: 2019-01-14 | End: 2019-01-14 | Stop reason: HOSPADM

## 2019-01-14 RX ORDER — DIPHENHYDRAMINE HCL 25 MG
25 TABLET ORAL EVERY 6 HOURS PRN
Status: DISCONTINUED | OUTPATIENT
Start: 2019-01-14 | End: 2019-01-15 | Stop reason: HOSPADM

## 2019-01-14 RX ADMIN — DOCUSATE SODIUM 100 MG: 100 CAPSULE, LIQUID FILLED ORAL at 18:47

## 2019-01-14 RX ADMIN — ACETAMINOPHEN 1000 MG: 500 TABLET, COATED ORAL at 08:47

## 2019-01-14 RX ADMIN — VANCOMYCIN HYDROCHLORIDE 1500 MG: 1 INJECTION, POWDER, LYOPHILIZED, FOR SOLUTION INTRAVENOUS at 09:21

## 2019-01-14 RX ADMIN — OXYCODONE HYDROCHLORIDE 10 MG: 10 TABLET, FILM COATED, EXTENDED RELEASE ORAL at 08:47

## 2019-01-14 RX ADMIN — CELECOXIB 400 MG: 200 CAPSULE ORAL at 08:47

## 2019-01-14 RX ADMIN — ACETAMINOPHEN 1000 MG: 500 TABLET, FILM COATED ORAL at 12:53

## 2019-01-14 RX ADMIN — DOCUSATE SODIUM AND SENNOSIDES 1 TABLET: 8.6; 5 TABLET, FILM COATED ORAL at 18:43

## 2019-01-14 RX ADMIN — SODIUM CHLORIDE, SODIUM LACTATE, POTASSIUM CHLORIDE, CALCIUM CHLORIDE: 600; 310; 30; 20 INJECTION, SOLUTION INTRAVENOUS at 08:48

## 2019-01-14 RX ADMIN — VANCOMYCIN HYDROCHLORIDE 1200 MG: 10 INJECTION, POWDER, LYOPHILIZED, FOR SOLUTION INTRAVENOUS at 21:23

## 2019-01-14 RX ADMIN — SODIUM CHLORIDE, SODIUM LACTATE, POTASSIUM CHLORIDE, CALCIUM CHLORIDE AND DEXTROSE MONOHYDRATE: 5; 600; 310; 30; 20 INJECTION, SOLUTION INTRAVENOUS at 12:53

## 2019-01-14 RX ADMIN — GABAPENTIN 300 MG: 300 CAPSULE ORAL at 08:47

## 2019-01-14 RX ADMIN — FAMOTIDINE 40 MG: 20 TABLET, FILM COATED ORAL at 18:43

## 2019-01-14 RX ADMIN — ACETAMINOPHEN 1000 MG: 500 TABLET, FILM COATED ORAL at 23:41

## 2019-01-14 RX ADMIN — CLOPIDOGREL BISULFATE 75 MG: 75 TABLET ORAL at 12:53

## 2019-01-14 RX ADMIN — STANDARDIZED SENNA CONCENTRATE AND DOCUSATE SODIUM 1 TABLET: 8.6; 5 TABLET, FILM COATED ORAL at 21:00

## 2019-01-14 RX ADMIN — ACETAMINOPHEN 1000 MG: 500 TABLET, FILM COATED ORAL at 18:43

## 2019-01-14 ASSESSMENT — COGNITIVE AND FUNCTIONAL STATUS - GENERAL
SUGGESTED CMS G CODE MODIFIER MOBILITY: CK
WALKING IN HOSPITAL ROOM: A LOT
SUGGESTED CMS G CODE MODIFIER DAILY ACTIVITY: CJ
HELP NEEDED FOR BATHING: A LITTLE
DRESSING REGULAR LOWER BODY CLOTHING: A LITTLE
DRESSING REGULAR UPPER BODY CLOTHING: A LITTLE
CLIMB 3 TO 5 STEPS WITH RAILING: A LOT
TOILETING: A LITTLE
MOVING FROM LYING ON BACK TO SITTING ON SIDE OF FLAT BED: A LITTLE
DAILY ACTIVITIY SCORE: 20
MOBILITY SCORE: 18
STANDING UP FROM CHAIR USING ARMS: A LITTLE

## 2019-01-14 ASSESSMENT — LIFESTYLE VARIABLES
ON A TYPICAL DAY WHEN YOU DRINK ALCOHOL HOW MANY DRINKS DO YOU HAVE: 1
TOTAL SCORE: 0
EVER_SMOKED: YES
HOW MANY TIMES IN THE PAST YEAR HAVE YOU HAD 5 OR MORE DRINKS IN A DAY: 0
TOTAL SCORE: 0
EVER FELT BAD OR GUILTY ABOUT YOUR DRINKING: NO
HAVE PEOPLE ANNOYED YOU BY CRITICIZING YOUR DRINKING: NO
AVERAGE NUMBER OF DAYS PER WEEK YOU HAVE A DRINK CONTAINING ALCOHOL: 7
CONSUMPTION TOTAL: NEGATIVE
HAVE YOU EVER FELT YOU SHOULD CUT DOWN ON YOUR DRINKING: NO
EVER HAD A DRINK FIRST THING IN THE MORNING TO STEADY YOUR NERVES TO GET RID OF A HANGOVER: NO
TOTAL SCORE: 0
ALCOHOL_USE: YES

## 2019-01-14 ASSESSMENT — PAIN SCALES - GENERAL
PAINLEVEL_OUTOF10: 0
PAINLEVEL_OUTOF10: 5
PAINLEVEL_OUTOF10: 0
PAINLEVEL_OUTOF10: 5
PAINLEVEL_OUTOF10: ASSUMED PAIN PRESENT
PAINLEVEL_OUTOF10: 5
PAINLEVEL_OUTOF10: 4
PAINLEVEL_OUTOF10: 5

## 2019-01-14 ASSESSMENT — PATIENT HEALTH QUESTIONNAIRE - PHQ9
1. LITTLE INTEREST OR PLEASURE IN DOING THINGS: NOT AT ALL
SUM OF ALL RESPONSES TO PHQ9 QUESTIONS 1 AND 2: 0
2. FEELING DOWN, DEPRESSED, IRRITABLE, OR HOPELESS: NOT AT ALL

## 2019-01-14 NOTE — CARE PLAN
Problem: Communication  Goal: The ability to communicate needs accurately and effectively will improve  Outcome: PROGRESSING AS EXPECTED    Intervention: [place] patient and significant other/support system to call light to alert staff of needs  Patient oriented to surroundings and unit policies/routines.  Educated and understands the use of the call button.  Patient calls appropriately.      Problem: Safety  Goal: Will remain free from falls  Outcome: PROGRESSING AS EXPECTED    Intervention: Implement fall precautions  Patient educated and understands the fall precautions in place to prevent falls.  Bed alarm is on, IV pole closest to bathroom, treaded slipper socks on, and bedrails closest to bathroom down.  Patient also educated and understands the use of the call button for any assistance with mobility.

## 2019-01-14 NOTE — OP REPORT
DATE OF SERVICE:  01/14/2019    PREOPERATIVE DIAGNOSIS:  Severe osteoarthritis, left knee.    POSTOPERATIVE DIAGNOSIS:  Severe osteoarthritis, left knee.    PROCEDURE:  Left total knee replacement using a [employer] II   system with a cemented size 6 femur, cemented size 6 tibia, a 13 mm spacer and   a cemented 32 mm oval patella.    SURGEON:  Jean Claude Meyer MD    ASSISTANT:  NOHEMI Mccoy.  Of note, the assistant played a crucial role   in this procedure by helping with the exposure and instrumentation for this   complex arthroplasty.    ANESTHESIA:  General per LMA with adductor canal block.    ANESTHESIOLOGIST:  Dwain Patel MD    OPERATIVE INDICATIONS:  The patient is an 83-year-old white male who has had   an ongoing history of significant and worsening osteoarthritis in the left   knee.  He is no longer responding to conservative program that has included   activity modification, nonsteroidal anti-inflammatories, corticosteroid   injections and therapy.  He is having ongoing pain and problems with   activities of daily living and it is felt that a knee replacement was   indicated in this active gentleman.    PROCEDURE IN DETAIL:  The patient was brought to the operating room and placed   on table in supine position where a satisfactory general anesthetic agent was   administered per LMA.  The patient was given 2 g of Ancef following a test   dose as well as 1500 mg of vancomycin IV prior to beginning the procedure.  Of   note, an adductor canal block was performed by Dr. Patel in preoperative   holding for perioperative pain control.  The left knee revealed a range of 0,   5, 130 with no pathological laxity about the cruciates or collaterals.  Left   lower extremity was prepped with gel prep, draped free in a sterile fashion.    Leg was elevated for exsanguination and tourniquet was inflated to 250 mmHg.    An 18 cm midline incision was made beginning at the tibial tubercle and    extending proximally.  This was carried sharply through skin and subcutaneous   tissue down to the extensor mechanism, which was exposed.  A medial   parapatellar incision was made.  The fat pad was excised.  The anterior horns   of the medial and lateral menisci were resected as was the ACL.  There was   complete loss of articular cartilage medially.  A medial release was   performed.  The distal femur was cannulated with a drill and a long   intramedullary guide shaun was placed with a 5-degree valgus alignment guide.    This was appropriately positioned and secured.  A size 6 femur was felt   appropriate from intraoperative measurements.  The initial anterior femoral   cut was made.  Distal femoral cutting guide was placed and secured and a   distal femoral cut was made.  The size 6, 4-in-1 cutting guide was placed   appropriately positioned and secured and final anterior and posterior as well   as chamfer cuts were made.  A size 6 trial was placed with excellent fit.  A   PCL retractor was placed.  The posterior horns of the medial and lateral   menisci were resected.  The extramedullary tibial guide was placed   appropriately positioned and secured.  The tibial cut was made.  A size 6   tibia was felt to be appropriate.  The initial tibial guide was placed and   secured and the tibia was cannulated.  The tibial trial was placed with the   femoral trial and a 9 mm spacer.  The appropriate rotation was obtained and   marked.  The lugs holes for the femoral lugs were punched and the tibial fin   was cut.  The tourniquet, which had been inflated to 250 mmHg was released   with initial tourniquet time of 24 minutes.  The wound was irrigated with   normal saline using a waterpik.  Hemostasis was obtained with the   electrocautery.  The patella was measured with calipers and the patellar   cutting guide was placed, patellar cut being made.  A 32 oval patella was felt   to be appropriate and the alignment guide was  placed with the alignment holes   drilled.  The wound was again copiously irrigated with normal saline.  The   leg was again elevated for exsanguination and tourniquet was reinflated to 250   mmHg.  A joint solution was created containing 40 mL of 0.5% ropivacaine, 30   mg of Toradol, 1 mL of epinephrine diluted in 90 mL with normal saline.  A 60   mL were placed in the periarticular tissues at this time.  The remaining 30 mL   placed in the subcutaneous tissue at the time of closure.  The bony surfaces   were irrigated with the waterpik.  Two batches of Simplex HV cement was mixed   with vacuum suctioning techniques.  This was applied first to the tibia and   tibial component, which was placed and fully impacted with excess cement   removed.  It was then placed onto the femur and femoral component, which was   placed and fully impacted with excess cement removed.  An 11 mm spacer was   placed and knee was placed in full extension to allow for compression at the   components.  Cement was then applied to the patella and patellar component,   which was placed and held with patellar clamp.  Excess cement was removed.    The cement was allowed to fully cure.  All excess cement was then carefully   removed.  The wound was again copiously irrigated.  Trials with 11 and 13 mm   spacers reveal the 13 mm spacer gave full extension with full flexion and   excellent stability in both flexion and extension.  The wound was again   irrigated and the permanent 13 mm spacer was placed and seated.  The patella   tracked well and a lateral release was not performed.  The wound was again   irrigated and closed with #2 PDO Quill suture in the extensor mechanism, 2-0   Monocryl in the subcutaneous tissue and staples in the skin.  An Aquacel   dressing was placed, held in place with an Ace wrap.  Tourniquet was released,   total tourniquet time of 58 minutes.  The patient was awakened, extubated in   the operating room, taken to recovery  room in stable condition.  Sponge and   needle counts were correct.  There were no identified intraoperative   complications.       ____________________________________     MD JENNY Boothe / MARY CARMEN    DD:  01/14/2019 10:55:56  DT:  01/14/2019 11:36:23    D#:  [id number]  Job#:  [id number]    cc: FARRAH LAW [facility] FA

## 2019-01-14 NOTE — OR NURSING
0900 pt's skin on operative left knee was knicked by clipper by ANNIE Maravilla when she did the pre op prep.  She then stopped clipping .  Surgeon made aware and photo taken for pt's chart.

## 2019-01-14 NOTE — PROCEDURES
PERIPHERAL NERVE BLOCK:   Start time: 0915  End time: 0920    Indication:   Postop Analgesia ONLY    Requested by:  Dr. Meyer      Time out completed:  Yes     Laterality:    Left    Block:   Selective Femoral at Add. Canal    Solution:   20ml 0.5%  Bupivacaine w/ epi and 2mg Dexamethasone    Type:    Single injection    Localization:  U/S guidance & interpretation    Technique:  Sterile    Prep Solution: ChloraPrep    Aspiration of blood:  Negative    Parasthesia/Pain:  None    Performed while under general anesthesia:  No     Complications:  None    Additional Notes:          Dwain Patel M.D.  1/14/2019  9:49 AM

## 2019-01-14 NOTE — PROGRESS NOTES
"Patient arrived to 223-2 on hospital bed.  Safety precautions in place.  Patient is on 2L o2, IV saline locked, VSS.  Reports 4/10 pain that is tolerable and states that \"it just feels heavy\", \"not really actual pain right now\".  Polar ice and SCDs in place.  Plan of care discussed with patient.  Will continue to monitor.  "

## 2019-01-14 NOTE — OR SURGEON
Immediate Post OP Note    PreOp Diagnosis: Severe OA left knee    PostOp Diagnosis: same    Procedure(s):  KNEE ARTHROPLASTY TOTAL - Wound Class: Clean    Surgeon(s):  Jean Claude Meyer M.D.  Assist Devaughn    Anesthesiologist/Type of Anesthesia:  Anesthesiologist: Dwain Patel M.D./General    Surgical Staff:  Circulator: Jer Heredia R.N.  Limb Berkowitz: Macey Oh  Relief Circulator: Dana Stewart R.N.  Scrub Person: Bill Marquez Assist: Chuck Cantor, C.N.A.    Specimens removed if any:  * No specimens in log *    Estimated Blood Loss: 50 cc    Findings: OA    Complications: none        1/14/2019 10:56 AM Jean Claude Meyer M.D.

## 2019-01-14 NOTE — OR NURSING
1052: To PACU post left total knee arthroplasty w/ block. Pt is extubated, breathing is spontaneous and unlabored. Strong pulse noted. Pt denies pain or nausea at this time.  1115: Pt more awake. Pain is tolerable, no nausea.  1135: No change.  1200: Pain remains tolerable, no nausea. Meets criteria for transfer to room.

## 2019-01-15 VITALS
SYSTOLIC BLOOD PRESSURE: 135 MMHG | HEART RATE: 61 BPM | WEIGHT: 179.9 LBS | DIASTOLIC BLOOD PRESSURE: 68 MMHG | RESPIRATION RATE: 18 BRPM | OXYGEN SATURATION: 98 % | HEIGHT: 68 IN | TEMPERATURE: 98.6 F | BODY MASS INDEX: 27.26 KG/M2

## 2019-01-15 LAB
ANION GAP SERPL CALC-SCNC: 4 MMOL/L (ref 0–11.9)
BUN SERPL-MCNC: 14 MG/DL (ref 8–22)
CALCIUM SERPL-MCNC: 9 MG/DL (ref 8.4–10.2)
CHLORIDE SERPL-SCNC: 109 MMOL/L (ref 96–112)
CO2 SERPL-SCNC: 24 MMOL/L (ref 20–33)
CREAT SERPL-MCNC: 0.81 MG/DL (ref 0.5–1.4)
ERYTHROCYTE [DISTWIDTH] IN BLOOD BY AUTOMATED COUNT: 46.1 FL (ref 35.9–50)
GLUCOSE SERPL-MCNC: 146 MG/DL (ref 65–99)
HCT VFR BLD AUTO: 39.1 % (ref 42–52)
HGB BLD-MCNC: 13.2 G/DL (ref 14–18)
MCH RBC QN AUTO: 31.1 PG (ref 27–33)
MCHC RBC AUTO-ENTMCNC: 33.8 G/DL (ref 33.7–35.3)
MCV RBC AUTO: 92.2 FL (ref 81.4–97.8)
PLATELET # BLD AUTO: 210 K/UL (ref 164–446)
PMV BLD AUTO: 9.9 FL (ref 9–12.9)
POTASSIUM SERPL-SCNC: 3.9 MMOL/L (ref 3.6–5.5)
RBC # BLD AUTO: 4.24 M/UL (ref 4.7–6.1)
SODIUM SERPL-SCNC: 137 MMOL/L (ref 135–145)
WBC # BLD AUTO: 11.2 K/UL (ref 4.8–10.8)

## 2019-01-15 PROCEDURE — 80048 BASIC METABOLIC PNL TOTAL CA: CPT

## 2019-01-15 PROCEDURE — G0378 HOSPITAL OBSERVATION PER HR: HCPCS

## 2019-01-15 PROCEDURE — 700112 HCHG RX REV CODE 229: Performed by: ORTHOPAEDIC SURGERY

## 2019-01-15 PROCEDURE — 700102 HCHG RX REV CODE 250 W/ 637 OVERRIDE(OP): Performed by: ORTHOPAEDIC SURGERY

## 2019-01-15 PROCEDURE — 85027 COMPLETE CBC AUTOMATED: CPT

## 2019-01-15 PROCEDURE — 97162 PT EVAL MOD COMPLEX 30 MIN: CPT

## 2019-01-15 PROCEDURE — A9270 NON-COVERED ITEM OR SERVICE: HCPCS | Performed by: ORTHOPAEDIC SURGERY

## 2019-01-15 PROCEDURE — 36415 COLL VENOUS BLD VENIPUNCTURE: CPT

## 2019-01-15 PROCEDURE — 97165 OT EVAL LOW COMPLEX 30 MIN: CPT

## 2019-01-15 RX ADMIN — LEVOTHYROXINE SODIUM 150 MCG: 75 TABLET ORAL at 05:30

## 2019-01-15 RX ADMIN — FLUTICASONE PROPIONATE 220 MCG: 220 AEROSOL, METERED RESPIRATORY (INHALATION) at 05:33

## 2019-01-15 RX ADMIN — DOCUSATE SODIUM 100 MG: 100 CAPSULE, LIQUID FILLED ORAL at 05:30

## 2019-01-15 RX ADMIN — FLUTICASONE PROPIONATE 100 MCG: 50 SPRAY, METERED NASAL at 05:29

## 2019-01-15 RX ADMIN — CLOPIDOGREL BISULFATE 75 MG: 75 TABLET ORAL at 05:31

## 2019-01-15 ASSESSMENT — COGNITIVE AND FUNCTIONAL STATUS - GENERAL
SUGGESTED CMS G CODE MODIFIER DAILY ACTIVITY: CJ
HELP NEEDED FOR BATHING: A LITTLE
DRESSING REGULAR LOWER BODY CLOTHING: A LITTLE
DAILY ACTIVITIY SCORE: 21
TOILETING: A LITTLE

## 2019-01-15 ASSESSMENT — PAIN SCALES - GENERAL
PAINLEVEL_OUTOF10: 2
PAINLEVEL_OUTOF10: 3

## 2019-01-15 ASSESSMENT — GAIT ASSESSMENTS
GAIT LEVEL OF ASSIST: STAND BY ASSIST
DISTANCE (FEET): 200
DEVIATION: STEP TO;ANTALGIC
ASSISTIVE DEVICE: FRONT WHEEL WALKER

## 2019-01-15 ASSESSMENT — ACTIVITIES OF DAILY LIVING (ADL): TOILETING: INDEPENDENT

## 2019-01-15 NOTE — DISCHARGE SUMMARY
DATE OF ADMISSION:  01/14/2019    DATE OF DISCHARGE:  01/15/2019    ADMITTING DIAGNOSIS:  Severe degenerative arthritis, left knee.    DISCHARGE DIAGNOSIS:  Severe degenerative arthritis, right knee.    PROCEDURE:  Left total knee replacement on 01/14/2019.    INFECTIONS:  None.    COMPLICATIONS:  None.    CONDITION ON DISCHARGE:  Stable.    ADMITTING INFORMATION:  Please see dictated note.    HOSPITAL COURSE:  After appropriate preoperative evaluation, obtaining   informed consent, the patient was taken to the operating room on 01/14/2019   where the above listed procedure was performed under general anesthetic agent.    The patient tolerated the procedure well and was taken to recovery room and   then to the floor in stable condition.  He received 24 hours of perioperative   vancomycin and was placed back on his Plavix as well as use of mechanical   prophylaxis for DVT prevention.  He was begun an ambulation on the afternoon   of surgery and by postoperative day #1, he is comfortable on p.o. pain   medication and independently ambulatory with his walker.  He has achieved   range of 0, 0, 90 in the left knee with good quad control.  He has a soft,   nontender calf and intact distal neurologic and vascular exam with clean and   dry wound and stable labs.  He will be discharged to home following morning   therapy on postoperative day #1.    DISPOSITION:  The patient will be discharged home today and will return to see   me in approximately 2 weeks for wound check and staple removal.  He may be   weightbearing as tolerated on the left knee and has been encouraged to work   aggressively on range of motion both flexion and extension to the left knee.    Arrangements have been made for outpatient physical therapy to begin this   week.  He may shower with his Aquacel dressing in place and the Aquacel should   be removed in approximately 5 days and wound covered with a small gauze   dressing.  The wound should be kept  clean and dry and we should be notified if   there are problems with increasing pain, drainage, evidence of infection,   neurovascular compromise or other significant concern.  The patient does have   a prescription for Percocet 10/325 at home to take 1/2 to 1 p.o. q. 4 hours   p.r.n. pain.  He will resume his preoperative Plavix for DVT prophylaxis and   will resume his other normal home medications as well.       ____________________________________     MD JENNY Boothe / MARY CARMEN    DD:  01/15/2019 08:37:33  DT:  01/15/2019 09:03:28    D#:  [id number]  Job#:  [id number]

## 2019-01-15 NOTE — PROGRESS NOTES
Front wheel walker was delivered and adjusted for patient. DME forms were signed by spouse as directed by patient. Patient had no questions regarding the use, function, or adjustment of front wheel walker. Patient was left in care of RN and physical therapy.

## 2019-01-15 NOTE — THERAPY
"Occupational Therapy Evaluation completed.   Functional Status: Pt is an 82 y/o male, admit for a L TKA. Pt lives with his wife, who can assist with care after d/c. PLOF- I with AMB ADLS. Pt presents with minimal c/o pain with ADLS and functional mobility, is at the Supervised to SBA level for ADLS and functional transfers. Pt was educated regarding techniques for ADLS and functional mobility, post surgery. Pt will be seen for initial evaluation and treatment only, as he is functional in this setting.   Plan of Care: Patient with no further skilled OT needs in the acute care setting at this time  Discharge Recommendations:  Equipment: Front-Wheel Walker. Post-acute therapy Discharge to home with outpatient or home health for additional skilled therapy services.    See \"Rehab Therapy-Acute\" Patient Summary Report for complete documentation.    "

## 2019-01-15 NOTE — DISCHARGE INSTRUCTIONS
Discharge Instructions    Discharged to home by car with relative. Discharged via wheelchair, hospital escort: Yes.  Special equipment needed: Walker    Be sure to schedule a follow-up appointment with your primary care doctor or any specialists as instructed.     Discharge Plan:   Diet Plan: Discussed  Activity Level: Discussed  Confirmed Follow up Appointment: Patient to Call and Schedule Appointment  Confirmed Symptoms Management: Discussed  Medication Reconciliation Updated: No (Comments)  Influenza Vaccine Indication: Patient Refuses    I understand that a diet low in cholesterol, fat, and sodium is recommended for good health. Unless I have been given specific instructions below for another diet, I accept this instruction as my diet prescription.   Other diet: regular    Home after am PT.  WBAT on left, aggressive ROM left knee.  Please remove IV before discharge.  I will arrange outpt PT.  May shower with incision covered. Will resume home Plavix dose    Special Instructions: Discharge instructions for the Orthopedic Patient    Follow up with Primary Care Physician within 2 weeks of discharge to home, regarding:  Review of medications and diagnostic testing.  Surveillance for medical complications.  Workup and treatment of osteoporosis, if appropriate.     -Is this a Joint Replacement patient? Yes Total Joint Knee Replacement Discharge Instructions    Pain  - The goal is to slowly wean off the prescription pain medicine.  - Ice can be used for pain control.  20 minutes at a time is recommended, and never directly against your skin or incision.  - Most patients are off the pain pills by 3 weeks; others may require a low level of pain medications for many months.  If your pain continues to be severe, follow up with your physician.  Infection    Knee joint infections; occur in fewer than 2% of patients. The most common causes of infection following total knee replacement surgery are from bacteria that enter the  bloodstream during dental procedures, urinary tract infections, or skin infections. These bacteria can lodge around your knee replacement and cause an infection.  - Keep the incision as clean and dry as possible.  - Always wash your hands before touching your incision.  - Skin infections tend to develop around 7-10 days after surgery; most can be treated with oral antibiotics.  - Dental Care should be delayed for 3 months after surgery, your surgeon recommends taking a dose of antibiotics 1 hour prior to any dental procedure. After 2 years, most surgeons recommend antibiotics only before an extensive procedure.  Ask your surgeon what he recommends.  - Signs and symptoms of infection can include:  low grade fever, redness, pain, swelling and drainage from your incision.  Notify your surgeon immediately if you develop any of these symptoms.  Other instructions  - Bowel habits - constipation is extremely common and is caused by a combination of anesthesia, lack of mobility and pain medicine.  Use stool softeners or laxatives if necessary. It is important not to ignore this problem, as bowel obstructions can be a serious complication after joint replacement surgery.  - Mood/Energy Level - Many patients experience a lack of energy and endurance for up to 2-3 months after surgery.  Some may also feel down and can even become depressed.  This is likely due to the postoperative anemia, change in activity level, lack of sleep, pain medicine and just the emotional reaction to the surgery itself that is a big disruption in a person’s life.  This usually passes.  If symptoms persist, follow up with your primary physician.  - Returning to work - Your surgeon will give you more specific instructions. Depending on the type of activities you perform, it may be 6 to 8 weeks before you return to work.   Generally, if you work a sedentary job requiring little standing or walking, most patients may return within 2-6 weeks.  Manual  labor jobs involving walking, lifting and standing may take longer. Your surgeon’s office can provide a release to part-time or light duty work early on in your recovery and progress you to full duty as able.    - Driving - If your left knee was replaced and you have an automatic transmission, you may be able to begin driving in a week or so, provided you are no longer taking narcotic pain medication. If your right knee was replaced, avoid driving for 6 to 8 weeks. Remember that your reflexes may not be as sharp as before your surgery. Ask your surgeon for specific instructions.   - Avoiding falls - A fall during the first few weeks after surgery can damage your new knee and may result in a need for further surgery.   throw rugs and tack down loose carpeting.  Be aware of floor hazards such as pets, small objects or uneven surfaces.    - [employer] Metal Detectors - The sensitivity of metal detectors varies and it is likely that your prosthesis will cause an alarm.  Inform the  of your artificial joint.  Diet  - Resume your normal diet as tolerated.  - It is important to achieve a healthy nutritional status by eating a well balanced diet on a regular basis.  - Your physician may recommend that you take iron and vitamin supplements.   - Continue to drink plenty of fluids.  Shower/Bathing  - You may shower as soon as you get home from the hospital unless otherwise instructed.  - Keep your incision out of water.  To keep the incision dry when showering, cover it with a plastic bag or plastic wrap.  - Pat incision dry if it gets wet.  Don’t rub.  - Do not submerge in a bath until staples are out and the incision is completely healed. (Approximately 6-8 weeks)  Dressing Change:  Procedure (if recommended by your physician)  - Wash hands.  - Open all new dressing change materials.  - Remove old dressing and discard.  - Inspect incision for redness, increase in clear drainage, yellow/green drainage,  odor and surrounding skin hot to touch.  -  ABD (large gauze) pad or “island dressing” by one corner and lay over the incision.  Be careful not to touch the inside of the dressing that will lay over the incision.  - Secure in place as instructed (Ace wrap or tape).    Swelling/Bruising    - Swelling can last from 3-6 months.  - Elevate your leg higher than your heart while reclining.   The first week you are home you should elevate your leg an equal amount of time, as you are active.    - Anti-inflammatory pills can be taken once you have stopped the blood thinners.  - The swelling is usually worse after you go home since you are upright for longer periods of time.  - Bruising is common and can involve the entire leg including the thigh, calf and even foot. Bruising often does not appear until after you arrive home and it can be quite dramatic- purple, black, and green.  The bruising you can see is not usually concerning and will subside without any treatment.      Blood Clot Prevention  Blood clots in the legs and the less common, but frightening, clots that travel to the lungs are a real focus of our preventative. Most patients are at standard risk for them, but those patients who are at higher risk include people who have had previous clots, a family history of clotting, smoking, diabetes, obesity, advanced age, use of estrogen and a sedentary lifestyle.    - Signs of blood clots in legs - Swelling in thigh, calf or ankle that does not go down with elevation.  Pain, heat and tenderness in calf, back of calf or groin area.  NOTE: blood clots can occur in either leg.  - You have been receiving anticoagulant therapy (blood thinners) in the hospital and you may be instructed to continue at home depending on your risk factors.  - Your risk for developing a clot continues for up to 2-3 months after surgery.  You should avoid prolonged sitting and dehydration during that time (long air trips and car trips).  If  you do take a trip during this time, please get up and move around every 1- 1.5 hours.  - If you are prescribed blood-thinning medication for home, follow instructions as directed. (Handouts provided if applicable).      Activity  Once home, you should continue to stay active. The key is to remember not to overdo it! While you can expect some good days and some bad days, you should notice a gradual improvement and a gradual increase in your endurance over the next 6 to 12 months. Exercise is a critical component of home care, particularly during the first few weeks after surgery.     - Normal activities of daily living You should be able to resume most within 3 to 6 weeks following surgery. Some pain with activity and at night is common for several weeks after surgery  -  Physical Therapy Exercises - Continue to do the exercises prescribed for at least two months after surgery. Riding a stationary bicycle can help maintain muscle tone and keep your knee flexible. Try to achieve the maximum degree of bending and extension possible. (handout provided by Therapist).  - Sexual Activity -. Your surgeon can tell you when it safe to resume sexual activity.    - Sleeping Positions - You can safely sleep on your back, on either side, or on your stomach.   - Other Activities - Walk as much as you like, but remember that walking is no substitute for the exercises your doctor and physical therapist will prescribe. Lower impact activities are preferred.  If you have specific questions, consult your Surgeon.    When to Call the Doctor   Call the physician if:   - Fever over 100.5? F  - Increased pain, drainage, redness, odor or heat around the incision area  - Shaking chills  - Increased knee pain with activity and rest  - Increased pain in calf, tenderness or redness above or below the knee  - Increased swelling of calf, ankle, foot  - Sudden increased shortness of breath, sudden onset of chest pain, localized chest pain with  coughing  - Incision opening  Or, if there are any questions or concerns about medications or care.       -Is this patient being discharged with medication to prevent blood clots?  Plavix    · Is patient discharged on Warfarin / Coumadin?   No     Depression / Suicide Risk    As you are discharged from this [facility] facility, it is important to learn how to keep safe from harming yourself.    Recognize the warning signs:  · Abrupt changes in personality, positive or negative- including increase in energy   · Giving away possessions  · Change in eating patterns- significant weight changes-  positive or negative  · Change in sleeping patterns- unable to sleep or sleeping all the time   · Unwillingness or inability to communicate  · Depression  · Unusual sadness, discouragement and loneliness  · Talk of wanting to die  · Neglect of personal appearance   · Rebelliousness- reckless behavior  · Withdrawal from people/activities they love  · Confusion- inability to concentrate     If you or a loved one observes any of these behaviors or has concerns about self-harm, here's what you can do:  · Talk about it- your feelings and reasons for harming yourself  · Remove any means that you might use to hurt yourself (examples: pills, rope, extension cords, firearm)  · Get professional help from the community (Mental Health, Substance Abuse, psychological counseling)  · Do not be alone:Call your Safe Contact- someone whom you trust who will be there for you.  · Call your local CRISIS HOTLINE 129-1262 or 788-356-2975  · Call your local Children's Mobile Crisis Response Team [place] (490) 872-0195 or www.[employer]  · Call the toll free National Suicide Prevention Hotlines   · National Suicide Prevention Lifeline 208-959-ODVK (7992)  · National Hope Line Network 800-SUICIDE (529-3550)  ·   Clopidogrel tablets  What is this medicine?  CLOPIDOGREL (kloh PID oh grel) helps to prevent blood clots. This medicine is used  to prevent heart attack, stroke, or other vascular events in people who are at high risk.  This medicine may be used for other purposes; ask your health care provider or pharmacist if you have questions.  COMMON BRAND NAME(S): Plavix  What should I tell my health care provider before I take this medicine?  They need to know if you have any of the following conditions:  -bleeding disorders  -bleeding in the brain  -having surgery  -history of stomach bleeding  -an unusual or allergic reaction to clopidogrel, other medicines, foods, dyes, or preservatives  -pregnant or trying to get pregnant  -breast-feeding  How should I use this medicine?  Take this medicine by mouth with a glass of water. Follow the directions on the prescription label. You may take this medicine with or without food. If it upsets your stomach, take it with food. Take your medicine at regular intervals. Do not take it more often than directed. Do not stop taking except on your doctor's advice.  A special MedGuide will be given to you by the pharmacist with each prescription and refill. Be sure to read this information carefully each time.  Talk to your pediatrician regarding the use of this medicine in children. Special care may be needed.  Overdosage: If you think you have taken too much of this medicine contact a poison control center or emergency room at once.  NOTE: This medicine is only for you. Do not share this medicine with others.  What if I miss a dose?  If you miss a dose, take it as soon as you can. If it is almost time for your next dose, take only that dose. Do not take double or extra doses.  What may interact with this medicine?  Do not take this medicine with the following medications:  -dasabuvir; ombitasvir; paritaprevir; ritonavir  -defibrotide  This medicine may also interact with the following medications:  -antiviral medicines for HIV or AIDS  -aspirin  -certain medicines for depression like citalopram, fluoxetine,  fluvoxamine  -certain medicines for fungal infections like ketoconazole, fluconazole, voriconazole  -certain medicines for seizures like felbamate, oxcarbazepine, phenytoin  -certain medicines for stomach problems like cimetidine, omeprazole, esomeprazole  -certain medicines that treat or prevent blood clots like warfarin, enoxaparin, dalteparin, apixaban, dabigatran, rivaroxaban, ticlopidine  -chloramphenicol  -cilostazol  -fluvastatin  -isoniazid  -modafinil  -nicardipine  -NSAIDS, medicines for pain and inflammation, like ibuprofen or naproxen  -quinine  -repaglinide  -tamoxifen  -tolbutamide  -topiramate  -torsemide  This list may not describe all possible interactions. Give your health care provider a list of all the medicines, herbs, non-prescription drugs, or dietary supplements you use. Also tell them if you smoke, drink alcohol, or use illegal drugs. Some items may interact with your medicine.  What should I watch for while using this medicine?  Visit your doctor or health care professional for regular check ups. Do not stop taking your medicine unless your doctor tells you to.  Notify your doctor or health care professional and seek emergency treatment if you develop breathing problems; changes in vision; chest pain; severe, sudden headache; pain, swelling, warmth in the leg; trouble speaking; sudden numbness or weakness of the face, arm or leg. These can be signs that your condition has gotten worse.  If you are going to have surgery or dental work, tell your doctor or health care professional that you are taking this medicine.  Certain genetic factors may reduce the effect of this medicine. Your doctor may use genetic tests to determine treatment.  What side effects may I notice from receiving this medicine?  Side effects that you should report to your doctor or health care professional as soon as possible:  -allergic reactions like skin rash, itching or hives, swelling of the face, lips, or  tongue  -signs and symptoms of bleeding such as bloody or black, tarry stools; red or dark-brown urine; spitting up blood or brown material that looks like coffee grounds; red spots on the skin; unusual bruising or bleeding from the eye, gums, or nose  -signs and symptoms of a blood clot such as breathing problems; changes in vision; chest pain; severe, sudden headache; pain, swelling, warmth in the leg; trouble speaking; sudden numbness or weakness of the face, arm or leg  Side effects that usually do not require medical attention (report to your doctor or health care professional if they continue or are bothersome):  -constipation  -diarrhea  -headache  -upset stomach  This list may not describe all possible side effects. Call your doctor for medical advice about side effects. You may report side effects to FDA at 8-833-FDA-3079.  Where should I keep my medicine?  Keep out of the reach of children.  Store at room temperature of 59 to 86 degrees F (15 to 30 degrees C). Throw away any unused medicine after the expiration date.  NOTE: This sheet is a summary. It may not cover all possible information. If you have questions about this medicine, talk to your doctor, pharmacist, or health care provider.  © 2018 [employer]/Gold Standard (2016-09-22 10:00:44)

## 2019-01-15 NOTE — CARE PLAN
Problem: Pain Management  Goal: Pain level will decrease to patient's comfort goal    Intervention: Follow pain managment plan developed in collaboration with patient and Interdisciplinary Team  Pain at a tolerable level with PO medication, ice, and ambulation      Problem: Bowel/Gastric:  Goal: Normal bowel function is maintained or improved    Intervention: Educate patient and significant other/support system about diet, fluid intake, medications and activity to promote bowel function  Oral fluids and ambulation encouraged

## 2019-01-15 NOTE — PROGRESS NOTES
"Post op day # 1    No New Complaints, minimal pain, well controlled    Blood pressure 128/63, pulse 66, temperature 36.4 °C (97.6 °F), temperature source Oral, resp. rate 18, height 1.727 m (5' 8\"), weight 81.6 kg (179 lb 14.3 oz), SpO2 98 %.    Neurovascular intact  Wound Clean and Dry    Recent Labs      01/15/19   0500   WBC  11.2*   RBC  4.24*   HEMOGLOBIN  13.2*   HEMATOCRIT  39.1*   MCV  92.2   MCH  31.1   MCHC  33.8   RDW  46.1   PLATELETCT  210   MPV  9.9     Recent Labs      01/15/19   0500   SODIUM  137   POTASSIUM  3.9   CHLORIDE  109   CO2  24   GLUCOSE  146*   BUN  14   CREATININE  0.81   CALCIUM  9.0       Home after am PT.  WBAT on left, aggressive ROM left knee.  Please remove IV before discharge.  I will arrange outpt PT.  May shower with incision covered.  "

## 2019-01-15 NOTE — PROGRESS NOTES
Discharging patient home per physician order.  Discharged with spouse.  Demonstrated understanding of discharge instructions, follow up appointments, home medications, prescriptions, home care for surgical wound, and nursing care instructions for total knee.  Ambulating standby assistance with FWW, voiding without difficulty, pain well controlled, tolerating oral medications, oxygen saturation greater than 90% , tolerating diet.   Educational handouts given and discussed.  Verbalized understanding of discharge instructions and educational handouts.  All questions answered.  Belongings with patient at time of discharge. IV removed prior to DC.

## 2019-01-15 NOTE — CARE PLAN
Problem: Pain Management  Goal: Pain level will decrease to patient's comfort goal  Outcome: PROGRESSING AS EXPECTED   01/15/19 0036   OTHER   Non Verbal Scale  Calm;Sleeping;Unlabored Breathing       Problem: Venous Thromboembolism (VTW)/Deep Vein Thrombosis (DVT) Prevention:  Goal: Patient will participate in Venous Thrombosis (VTE)/Deep Vein Thrombosis (DVT)Prevention Measures  Outcome: PROGRESSING AS EXPECTED   01/14/19 2000 01/14/19 2100 01/15/19 0010   OTHER   Risk Assessment Score --  2 --    VTE RISK --  Moderate --    Pharmacologic Prophylaxis Used (Plavix) --  --    Mechanical/VTE Prophylaxis   Mechanical Prophylaxis  --  --  SCDs, Sequential Compression Device   SCDs, Sequential Compression Device --  --  On

## 2019-01-15 NOTE — THERAPY
"Physical Therapy Evaluation completed.   Bed Mobility:     Transfers: Sit to Stand: Stand by Assist  Gait: Level Of Assist: Stand by Assist with Front-Wheel Walker   X 200 feet    Plan of Care: Patient with no further skilled PT needs in the acute care setting at this time  Discharge Recommendations: Equipment: No Equipment Needed. Post-acute therapy Discharge to home with outpatient or home health for additional skilled therapy services.  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     "

## 2019-01-15 NOTE — PROGRESS NOTES
Pt is alert and oriented x 4, R knee dressing CDI, +CMS, flexion, cap refill less than 3 seconds, +pulses, denies N/V, unlabored breathing, denies SOB, SCD's in use, oriented to use of call light and importance of calling for assistance, bed alarm in use, bed in lowest position, call light within reach, updated on plan of care

## 2019-01-15 NOTE — FLOWSHEET NOTE
01/14/19 1730   Events/Summary/Plan   Events/Summary/Plan IS/O2   Education   Education Yes - Pt. / Family has been Instructed in use of Respiratory Equipment   Incentive Spirometry Group   Incentive Spirometry Instruction Yes   Breathing Exercises Yes   Incentive Spirometer Volume 1700 mL   Chest Exam   Respiration 18   Pulse 74   Oximetry   #Pulse Oximetry (Single Determination) Yes   Oxygen   Home O2 Use Prior To Admission? No   Pulse Oximetry 94 %   O2 (LPM) 0   O2 Daily Delivery Respiratory  Room Air with O2 Available

## 2019-01-24 ENCOUNTER — HOSPITAL ENCOUNTER (EMERGENCY)
Facility: MEDICAL CENTER | Age: 84
End: 2019-01-24
Attending: EMERGENCY MEDICINE
Payer: MEDICARE

## 2019-01-24 VITALS
HEIGHT: 68 IN | SYSTOLIC BLOOD PRESSURE: 120 MMHG | RESPIRATION RATE: 16 BRPM | DIASTOLIC BLOOD PRESSURE: 85 MMHG | WEIGHT: 172 LBS | HEART RATE: 62 BPM | OXYGEN SATURATION: 95 % | BODY MASS INDEX: 26.07 KG/M2 | TEMPERATURE: 98.7 F

## 2019-01-24 DIAGNOSIS — K59.03 DRUG-INDUCED CONSTIPATION: ICD-10-CM

## 2019-01-24 PROCEDURE — 700101 HCHG RX REV CODE 250: Performed by: EMERGENCY MEDICINE

## 2019-01-24 PROCEDURE — 99285 EMERGENCY DEPT VISIT HI MDM: CPT

## 2019-01-24 RX ORDER — ENEMA 19; 7 G/133ML; G/133ML
1 ENEMA RECTAL ONCE
Status: COMPLETED | OUTPATIENT
Start: 2019-01-24 | End: 2019-01-24

## 2019-01-24 RX ADMIN — SODIUM PHOSPHATE, DIBASIC AND SODIUM PHOSPHATE, MONOBASIC 133 ML: 7; 19 ENEMA RECTAL at 18:00

## 2019-01-25 NOTE — ED NOTES
Soap suds enema given per ERP. Pt having hard time holding in fluid for more than a few seconds. About 500ml in but much already flowed out on pad. Pt on commode and mostly fluid out with few solid stool pieces. Pt states he feels like there is a firm chunk of stool at rectum.

## 2019-01-25 NOTE — ED NOTES
2nd soap suds emema given per ERP. Pt still having hard time holding in fluid. Fluid coming out as fluid going in. Pt on commode.

## 2019-01-25 NOTE — ED NOTES
Patient provided printed discharge instructions which included signs and symptoms to look out for, why to return to ER, and other follow up appointment to make. Patient provided prescriptions, information on medications, and how to . Patient stated they understand discharge instructions and had no further questions or concerns at this time. Patient discharged to home with wife. Patient assisted out of ER with wheelchair.

## 2019-01-25 NOTE — ED NOTES
Pt c/o constipation since knee surgery on 1/14/19. Pt states he tried enema at home with no relief. ERP in to disimpact pt. Fleets enema provided, but some fluid leaked out of rectum when administered. Pt on commode.

## 2019-02-11 NOTE — H&P
Hospital Medicine History and Physical      Date of Service  4/25/2018    Chief Complaint  Chief Complaint   Patient presents with   • Possible Stroke       History of Presenting Illness  Dalton is a 82 y.o. male PMH of TIA, who presents with Expressive aphasia around 10:30 AM this morning. He denied any other neurological deficit, headache. It lasted for several minutes and it went away on its own. By time I saw the patient on his symptom has already resolved. He is not a candidate for TPA. In the ER he had a CT scan of the head and CT angiogram head and neck done with no acute finding. He will be admitted for observation.    Primary Care Physician  Patrica Garcia M.D.      Code Status  Full code    Review of Systems  Review of Systems   Constitutional: Negative for chills, fever and weight loss.   HENT: Negative for congestion and nosebleeds.    Eyes: Negative for blurred vision, pain, discharge and redness.   Respiratory: Negative for cough, sputum production, shortness of breath and stridor.    Cardiovascular: Negative for chest pain, palpitations and orthopnea.   Gastrointestinal: Negative for abdominal pain, diarrhea, heartburn, nausea and vomiting.   Genitourinary: Negative for dysuria, frequency and urgency.   Musculoskeletal: Negative for back pain, myalgias and neck pain.   Skin: Negative for itching and rash.   Neurological: Negative for dizziness, focal weakness, seizures and headaches.   Psychiatric/Behavioral: Negative for depression. The patient is not nervous/anxious and does not have insomnia.      Please see HPI, all other systems were reviewed and are negative (AMA/CMS criteria)     Past Medical History  Past Medical History:   Diagnosis Date   • HYPOTHYROIDISM 2014   • Dysphagia 2013    Dr. Joseph, vallecular lesion removed   • Colon polyp, hyperplastic 5/04   • Basal cell carcinoma of face 05/2004    Dr. Lucas   • CVA (cerebral vascular accident) ([facility]) 01/2003    Residual diplopia,   [place]   • TIA 2000   • Allergic rhinitis    • ASTHMA    • Cataract    • GOUT    • Rosacea     Dr. Lucas       Surgical History  Past Surgical History:   Procedure Laterality Date   • MICROLARYNGOSCOPY  2014    Performed by Jimbo Joseph M.D. at SURGERY SAME DAY [facility] ORS   • COLONOSCOPY WITH POLYP  2004    Hyperplastic   • KNEE ARTHROSCOPY  10/02    right, Dr. Engle   • TONSILLECTOMY         Medications  No current facility-administered medications on file prior to encounter.      Current Outpatient Prescriptions on File Prior to Encounter   Medication Sig Dispense Refill   • doxycycline (VIBRAMYCIN) 100 MG Cap Take 100 mg by mouth every day. ACNE     • fluticasone (FLONASE) 50 MCG/ACT nasal spray Spray 2 Sprays in nose every day. 3 Bottle 1   • clopidogrel (PLAVIX) 75 MG TABS Take 1 Tab by mouth every day. 90 Tab 3   • FLOVENT  MCG/ACT AERO INHALE 2 PUFFS BY MOUTH 2TIMES A DAY. 1 Inhaler 8   • Azelaic Acid (FINACEA) 15 % GEL 1 Application by Apply externally route 2 Times a Day.       Family History  Family History   Problem Relation Age of Onset   • Diabetes Mother    • Genetic Mother      dementia   • Heart Disease Sister    • Cancer Sister      breast cancer   • Diabetes Maternal Aunt    • Diabetes Maternal Grandmother    • Diabetes Maternal Grandfather    • Genetic Son      ALS         Social History  Social History   Substance Use Topics   • Smoking status: Former Smoker     Packs/day: 1.00     Years: 10.00     Types: Cigarettes     Quit date: 1/15/1963   • Smokeless tobacco: Never Used      Comment: quit . h/o 1 ppd x 10 years   • Alcohol use Yes      Comment: 7-8 per week       Allergies  Allergies   Allergen Reactions   • Penicillins Anaphylaxis        Physical Exam  Laboratory   Hemodynamics  Temp (24hrs), Av.2 °C (98.9 °F), Min:37.2 °C (98.9 °F), Max:37.2 °C (98.9 °F)   Temperature: 37.2 °C (98.9 °F)  Pulse  Av.5  Min: 53  Max: 67 Heart Rate (Monitored): (!)  56  Blood Pressure : 142/79, NIBP: 140/78      Respiratory      Respiration: 18, Pulse Oximetry: 99 %, O2 Daily Delivery Respiratory : Room Air with O2 Available        RUL Breath Sounds: Clear, RML Breath Sounds: Clear, RLL Breath Sounds: Clear, AMEENA Breath Sounds: Clear, LLL Breath Sounds: Clear    Physical Exam   Constitutional: He is oriented to person, place, and time. No distress.   HENT:   Head: Normocephalic and atraumatic.   Mouth/Throat: Oropharynx is clear and moist.   Eyes: Conjunctivae and EOM are normal. Pupils are equal, round, and reactive to light.   Neck: Normal range of motion. Neck supple. No tracheal deviation present. No thyromegaly present.   Cardiovascular: Normal rate and regular rhythm.    No murmur heard.  Pulmonary/Chest: Effort normal and breath sounds normal. No respiratory distress. He has no wheezes.   Abdominal: Soft. Bowel sounds are normal. He exhibits no distension. There is no tenderness.   Musculoskeletal: He exhibits no edema or tenderness.   Neurological: He is alert and oriented to person, place, and time. No cranial nerve deficit.   Skin: Skin is warm and dry. He is not diaphoretic. No erythema.   Psychiatric: He has a normal mood and affect. His behavior is normal. Thought content normal.       Recent Labs      04/25/18   1110   WBC  6.5   RBC  5.15   HEMOGLOBIN  15.9   HEMATOCRIT  46.1   MCV  89.5   MCH  30.9   MCHC  34.5   RDW  45.4   PLATELETCT  218   MPV  9.6     Recent Labs      04/25/18   1110   SODIUM  137   POTASSIUM  3.9   CHLORIDE  109   CO2  23   GLUCOSE  106*   BUN  21   CREATININE  0.80   CALCIUM  9.5     Recent Labs      04/25/18   1110   ALTSGPT  18   ASTSGOT  17   ALKPHOSPHAT  73   TBILIRUBIN  0.9   GLUCOSE  106*     Recent Labs      04/25/18   1110   APTT  26.8   INR  1.07             Lab Results   Component Value Date    TROPONINI <0.02 04/25/2018       Imaging  CT-CTA NECK WITH & W/O-POST PROCESSING   Final Result      1.  Patent carotid and vertebral  arteries without evidence of vascular occlusion or dissection.      2.  Minimal atherosclerotic plaque at the carotid bifurcations bilaterally. No significant flow-limiting stenosis.      CT-CTA HEAD WITH & W/O-POST PROCESS   Final Result      CT angiogram of the [place] within normal limits.      CT-HEAD W/O   Final Result      1.  No evidence of acute intracranial process.      2.  Cerebral atrophy as well as periventricular chronic small vessel ischemic change.      Echocardiogram Comp W/O cont    (Results Pending)   MR-BRAIN-W/O    (Results Pending)          Assessment/Plan     I anticipate this patient is appropriate for observation status at this time.    TIA (transient ischemic attack)- (present on admission)   Assessment & Plan    History of TIA in the past  Initially a CT scan was negative  Workup MRI of the brain, echocardiogram  On Plavix and atorvastatin  PTOT  Speech        Hypothyroid- (present on admission)   Assessment & Plan    Continue levothyroxine            Prophylaxis:  sc heparin          Inability to Tolerate Liquids or Foods/Fever greater than 101/Numbness, tingling/Swelling that continues/Bleeding that does not stop/Unable to Urinate/Persistent Nausea and Vomiting/Pain not relieved by Medications/Numbness, color, or temperature change to extremity/Excessive Diarrhea

## 2021-01-13 DIAGNOSIS — Z23 NEED FOR VACCINATION: ICD-10-CM

## 2022-12-19 ENCOUNTER — HOSPITAL ENCOUNTER (EMERGENCY)
Facility: MEDICAL CENTER | Age: 87
End: 2022-12-19
Attending: EMERGENCY MEDICINE
Payer: MEDICARE

## 2022-12-19 ENCOUNTER — APPOINTMENT (OUTPATIENT)
Dept: RADIOLOGY | Facility: MEDICAL CENTER | Age: 87
End: 2022-12-19
Attending: EMERGENCY MEDICINE
Payer: MEDICARE

## 2022-12-19 VITALS
HEIGHT: 68 IN | DIASTOLIC BLOOD PRESSURE: 83 MMHG | TEMPERATURE: 98.4 F | SYSTOLIC BLOOD PRESSURE: 159 MMHG | OXYGEN SATURATION: 98 % | WEIGHT: 175 LBS | HEART RATE: 59 BPM | BODY MASS INDEX: 26.52 KG/M2 | RESPIRATION RATE: 18 BRPM

## 2022-12-19 DIAGNOSIS — S60.419A ABRASION OF FINGER, INITIAL ENCOUNTER: ICD-10-CM

## 2022-12-19 DIAGNOSIS — S51.812A SKIN TEAR OF LEFT FOREARM WITHOUT COMPLICATION, INITIAL ENCOUNTER: ICD-10-CM

## 2022-12-19 DIAGNOSIS — S80.01XA CONTUSION OF RIGHT KNEE, INITIAL ENCOUNTER: ICD-10-CM

## 2022-12-19 DIAGNOSIS — S00.83XA CONTUSION OF FACE, INITIAL ENCOUNTER: ICD-10-CM

## 2022-12-19 DIAGNOSIS — S09.90XA CLOSED HEAD INJURY, INITIAL ENCOUNTER: ICD-10-CM

## 2022-12-19 PROCEDURE — 99284 EMERGENCY DEPT VISIT MOD MDM: CPT

## 2022-12-19 PROCEDURE — 70486 CT MAXILLOFACIAL W/O DYE: CPT

## 2022-12-19 PROCEDURE — 90715 TDAP VACCINE 7 YRS/> IM: CPT | Performed by: EMERGENCY MEDICINE

## 2022-12-19 PROCEDURE — 700111 HCHG RX REV CODE 636 W/ 250 OVERRIDE (IP): Performed by: EMERGENCY MEDICINE

## 2022-12-19 PROCEDURE — 90471 IMMUNIZATION ADMIN: CPT

## 2022-12-19 PROCEDURE — 70450 CT HEAD/BRAIN W/O DYE: CPT

## 2022-12-19 RX ADMIN — CLOSTRIDIUM TETANI TOXOID ANTIGEN (FORMALDEHYDE INACTIVATED), CORYNEBACTERIUM DIPHTHERIAE TOXOID ANTIGEN (FORMALDEHYDE INACTIVATED), BORDETELLA PERTUSSIS TOXOID ANTIGEN (GLUTARALDEHYDE INACTIVATED), BORDETELLA PERTUSSIS FILAMENTOUS HEMAGGLUTININ ANTIGEN (FORMALDEHYDE INACTIVATED), BORDETELLA PERTUSSIS PERTACTIN ANTIGEN, AND BORDETELLA PERTUSSIS FIMBRIAE 2/3 ANTIGEN 0.5 ML: 5; 2; 2.5; 5; 3; 5 INJECTION, SUSPENSION INTRAMUSCULAR at 15:44

## 2022-12-19 NOTE — ED TRIAGE NOTES
"Chief Complaint   Patient presents with    GLF     Pt slipped going up steps  Pt hit face and has multiple areas of injury to face  Skin tear to left arm and laceration to tip of left middle finger     Abrasion to right knee      Ht 1.727 m (5' 8\")   Wt 79.4 kg (175 lb)   BMI 26.61 kg/m²     "

## 2022-12-19 NOTE — ED NOTES
Pt resting on inga, wife of 65 years of bedside  Pt reports he fell on pavers at his house, denies LOC    Tech at bedside, cleaning L forearm

## 2022-12-19 NOTE — ED PROVIDER NOTES
ER Provider Note    Scribed for MACK LYMAN by Wilfredo Austin. 12/19/2022  3:33 PM    Primary Care Provider: Patrica Garcia M.D.  Means of Arrival: Walk-In  History obtained from: Spouse    CHIEF COMPLAINT  Chief Complaint   Patient presents with    GLF     Pt slipped going up steps  Pt hit face and has multiple areas of injury to face  Skin tear to left arm and laceration to tip of left middle finger     Abrasion to right knee     Pt is on Plavix       LIMITATION TO HISTORY   Select: : None    HPI  James Hoffman is a 87 y.o. male who presents to the ED complaining of facial trauma secondary to ground level fall onset prior to arrival. He states he caught his toe while walking up steps after getting the mail. He states he fell primarily to his left side and hit his face. He admits to associated symptoms of skin tear to left forearm, laceration to tip of left middle finger, and abrasion to right knee, but denies headache, neck pain, or any bony tenderness. No alleviating factors were reported. The patient reports taking Plavix daily.      OUTSIDE HISTORIAN(S):  Select: None    EXTERNAL RECORDS REVIEWED  Select: Other None    REVIEW OF SYSTEMS  Pertinent positives include facial trauma secondary to ground level fall, skin tear to left forearm, laceration to tip of left middle finger, and abrasion to right knee.   Pertinent negatives include no headache, neck pain, or any bony tenderness.    All other systems reviewed and negative.     PAST MEDICAL HISTORY  Past Medical History:   Diagnosis Date    Allergic rhinitis     ASTHMA     Basal cell carcinoma of face 05/2004    Dr. Lucas    Bowel habit changes 01/03/2019    Constipation    Cancer ([facility]) approx 2015 last    Basal cell of face.    Cataract 2018    Bilateral phaco with IOL    Colon polyp, hyperplastic 5/04    CVA (cerebral vascular accident) ([facility]) 01/2003    Residual diplopia, Dr. Cleaning    Diabetes ([facility])     diet controlled. 1/3/19-does not check  glucose at home.    Dysphagia 2013    Dr. Joseph, vallecular lesion removed    GOUT     Heart burn     treated with zantac.    Hemorrhagic disorder (HCC)     Due to plavix. Bleeds and bruises readily.     HYPOTHYROIDISM 2014    Indigestion     treated with zantac    Left knee pain 01/03/2019    with activity    Rosacea     Dr. Lucas    TIA 12/2000, 2017       SURGICAL HISTORY  Past Surgical History:   Procedure Laterality Date    KNEE ARTHROPLASTY TOTAL Left 1/14/2019    Procedure: KNEE ARTHROPLASTY TOTAL;  Surgeon: Jean Claude Meyer M.D.;  Location: SURGERY [facility];  Service: Orthopedics    MICROLARYNGOSCOPY  1/21/2014    Performed by Jimbo Joseph M.D. at SURGERY SAME DAY [facility]    KNEE ARTHROSCOPY Left 2013    COLONOSCOPY WITH POLYP  5/2004    Hyperplastic    RECONSTRUCTION, KNEE, ACL Right 10/2002     Dr. Engle    TONSILLECTOMY  as child       FAMILY HISTORY  Family History   Problem Relation Age of Onset    Diabetes Mother     Genetic Disorder Mother         dementia    Heart Disease Sister     Cancer Sister         breast cancer    Diabetes Maternal Aunt     Diabetes Maternal Grandmother     Diabetes Maternal Grandfather     Genetic Disorder Son         ALS       SOCIAL HISTORY   reports that he quit smoking about 59 years ago. His smoking use included cigarettes. He has a 10.00 pack-year smoking history. He has never used smokeless tobacco. He reports current alcohol use. He reports that he does not use drugs.    CURRENT MEDICATIONS  Previous Medications    ALBUTEROL 108 (90 BASE) MCG/ACT AERO SOLN INHALATION AEROSOL    Inhale 2 Puffs by mouth every 6 hours as needed for Shortness of Breath.    AZELAIC ACID (FINACEA) 15 % GEL    by Apply externally route every day.    CLOPIDOGREL (PLAVIX) 75 MG TABS    Take 1 Tab by mouth every day.    DOXYCYCLINE (VIBRAMYCIN) 100 MG CAP    Take 100 mg by mouth every day. ACNE    EFINACONAZOLE (JUBLIA) 10 % SOLUTION    by Apply externally route every day.     FLOVENT  MCG/ACT AERO    INHALE 2 PUFFS BY MOUTH 2TIMES A DAY.    FLUTICASONE (FLONASE) 50 MCG/ACT NASAL SPRAY    Spray 2 Sprays in nose every day.    GLYCERIN, ADULT, 2.1 GM SUPPOSITORY    Insert 1 Suppository in rectum 1 time daily as needed (Constipation).    LEVOTHYROXINE (SYNTHROID) 150 MCG TAB    Take 150 mcg by mouth Every morning on an empty stomach.    METRONIDAZOLE (METROGEL) 1 % GEL    Apply 1 Application to affected area(s) every day. Apply to nose    MULTIVITAMIN (THERAGRAN) TAB    Take 1 Tab by mouth every day.    POLYETHYLENE GLYCOL 3350-GRX PO    Take  by mouth as needed.    PROBIOTIC PRODUCT (PROBIOTIC PO)    Take 1 Cap by mouth every day.    RANITIDINE (ZANTAC) 300 MG TABLET    Take 300 mg by mouth every evening.       ALLERGIES  Penicillins    PHYSICAL EXAM  Constitutional: Well developed, Well nourished, mild distress, Non-toxic appearance.   HENT: Abrasions to forehead and bridge of nose. Tender to bridge of nose. Normocephalic, Bilateral external ears normal, Oropharynx moist, No oral exudates.   Eyes: PERRLA, EOMI, Conjunctiva normal, No discharge.   Neck: No C-spine tenderness, Supple, No stridor.   Lymphatic: No lymphadenopathy noted.   Cardiovascular: Normal heart rate, Normal rhythm.   Thorax & Lungs: Clear to auscultation bilaterally, No respiratory distress, No wheezing, No crackles.   Abdomen: Soft, No tenderness, No masses, No pulsatile masses.   Skin: Warm, Dry, No rash. Distal tip of left middle  finger w/ small 4 mm skin avulsion. Left forearm w/ fairly large skin tear, no active bleeding. Abrasion to right knee. See HENT.  Extremities:, No edema No cyanosis.   Musculoskeletal: No bony tenderness to palpation or major deformities noted.  Intact distal pulses  Neurologic: Awake, alert. Moves all extremities spontaneously.  Psychiatric: Affect normal, Judgment normal, Mood normal.       VITAL SIGNS:   /82   Pulse 71   Temp 36.4 °C (97.6 °F) (Temporal)   Resp 16    "Ht 1.727 m (5' 8\")   Wt 79.4 kg (175 lb)   SpO2 98%   BMI 26.61 kg/m²       DIAGNOSTIC STUDIES    Radiology:   CT-MAXILLOFACIAL W/O PLUS RECONS   Final Result      No displaced fracture of the facial bones.      CT-HEAD W/O   Final Result      No noncontrast CT evidence of acute intracranial hemorrhage.      Moderate white matter hypodensity is present.  This is a nonspecific finding which usually is found to represent chronic microvascular disease in patient's of this demographic.  Demyelination, age indeterminant ischemia and gliosis are also common    possibilities.      Age-appropriate atrophy           The radiologist's interpretation of all radiological studies have been reviewed by me.    COURSE & MEDICAL DECISION MAKING     Nursing notes, vital signs, PMSFSHx reviewed in chart     Prior records reviewed which indicate history of stroke, diabetes.    DISCUSSION OF MANAGEMENT WITH OTHER PHYSICIANS, QHP OR APPROPRIATE SOURCE  Select: None        ESCALATION OF CARE  Based on the presentation of the patient I have considered diagnostic imaging.      PRESCRIPTION DRUG CONSIDERED  Select: Pain Medications          PLAN   3:33 PM  Patient was treated with Adacel 0.5 mL INJ for his symptoms. Will order CT-Maxillofacial w/o plus recons and CT-Head w/o to evaluate his complaints.    5:21 PM  I reevaluated the patient at bedside.  I discussed the patient's diagnostic study results which are reassuring. I discussed plan for discharge and follow up as outlined below. The patient is stable for discharge at this time and will return for any new or worsening symptoms. Patient verbalizes understanding and support with my plan for discharge.       COURSE  This post fall, head injury, facial contusion, CT scans negative.  The patient's skin tear was Steri-Stripped and bandaged, have the patient return with worsening symptoms.    DISPOSITION   Patient will be discharged home.      FOLLOW UP:  [facility] " [place], Emergency Dept  [address]  [place] [address]  [phone]    If symptoms worsen    Patrica Garcia M.D.  [address]  [address]  [place] [id number] [phone]            CONDITION AT DISPOSITION  Stable     FINAL IMPRESSION   1. Closed head injury, initial encounter    2. Contusion of face, initial encounter    3. Skin tear of left forearm without complication, initial encounter    4. Abrasion of finger, initial encounter    5. Contusion of right knee, initial encounter         Wilfredo ÁLVAREZ (Scribe), am scribing for, and in the presence of, Bishnu South M.D..    Electronically signed by: Wilfredo Austin (Scribe), 12/19/2022    IBishnu M.D. personally performed the services described in this documentation, as scribed by Wilfredo Austin in my presence, and it is both accurate and complete.      The note accurately reflects work and decisions made by me.  Bishnu South M.D.  12/19/2022  10:20 PM

## 2022-12-20 NOTE — ED NOTES
Wound to finger and L forearm cleaned and dressings applied  Pt discharged, reviewed all discharge instructions including follow up, pt verbalizes understanding, and denies questions.   Escorted to lobby. No belongings left in room.

## 2022-12-20 NOTE — ED NOTES
Additional guest to bedside  Pt back from CT and resting on inga, pt in no acute distress, pt provided call light, instructed to call if needing any assistance, instructed not to get up by self, inga in lowest position.

## 2023-06-24 ENCOUNTER — HOSPITAL ENCOUNTER (EMERGENCY)
Facility: MEDICAL CENTER | Age: 88
End: 2023-06-24
Attending: EMERGENCY MEDICINE
Payer: MEDICARE

## 2023-06-24 VITALS
WEIGHT: 190.7 LBS | OXYGEN SATURATION: 94 % | HEIGHT: 68 IN | BODY MASS INDEX: 28.9 KG/M2 | RESPIRATION RATE: 16 BRPM | TEMPERATURE: 98.3 F | DIASTOLIC BLOOD PRESSURE: 66 MMHG | SYSTOLIC BLOOD PRESSURE: 124 MMHG | HEART RATE: 59 BPM

## 2023-06-24 DIAGNOSIS — Z79.01 ANTICOAGULATED: ICD-10-CM

## 2023-06-24 DIAGNOSIS — S41.111A LACERATION OF RIGHT UPPER EXTREMITY WITH COMPLICATION, INITIAL ENCOUNTER: ICD-10-CM

## 2023-06-24 PROCEDURE — 99284 EMERGENCY DEPT VISIT MOD MDM: CPT

## 2023-06-24 PROCEDURE — 304217 HCHG IRRIGATION SYSTEM

## 2023-06-24 NOTE — DISCHARGE INSTRUCTIONS
Please keep area clean and dry for 2 days and then can exposed to shower on Monday  Trim Steri-Strip edges as needed  Continue Plavix therapy

## 2023-06-24 NOTE — ED TRIAGE NOTES
"Chief Complaint   Patient presents with    Wound Check     89 yo male ambulates to triage with reports of right elbow skin tear after falling today on grass.  Patient reports elbow was originally torn last week and was healing but today after falling re opened wound.  Patient denies any other injuries reports he is on blood thinners.  Currently has a bandaid on elbow.     /71   Pulse 66   Temp 37.3 °C (99.2 °F) (Temporal)   Resp 18   Ht 1.727 m (5' 8\")   Wt 86.5 kg (190 lb 11.2 oz)   SpO2 97%   BMI 29.00 kg/m²    "

## 2023-06-24 NOTE — ED NOTES
Patient soaking his right elbow in warm water and soap.  Steristrips placed at the bedside for the MD

## 2023-06-24 NOTE — ED NOTES
Right elbow wound cleaned and prepped for the ER doctor.     ER doctor at the bedside to apply the steristrips

## 2023-06-24 NOTE — ED PROVIDER NOTES
ED Provider Note    CHIEF COMPLAINT  Chief Complaint   Patient presents with    Wound Check     89 yo male ambulates to triage with reports of right elbow skin tear after falling today on grass.  Patient reports elbow was originally torn last week and was healing but today after falling re opened wound.  Patient denies any other injuries reports he is on blood thinners.  Currently has a bandaid on elbow.        EXTERNAL RECORDS REVIEWED  Outpatient Notes reviewed outpatient note from Dr. Cordell Pelayo in the office in May 2019    HPI/ROS    OUTSIDE HISTORIAN(S):  Family wife gives additional history about his events last week as well as today    James Hoffman is a 88 y.o. male who presents with wife and patient stating that he had an accidental fall with elbow laceration that they did Steri-Strips to at home but then another fall today directly onto his elbow which broke up in the original wound and now has extended the wound and he is on Plavix with active oozing.  States that he has no problem with range of motion, pronation or supination and there is no obvious deformity    PAST MEDICAL HISTORY   has a past medical history of Allergic rhinitis, ASTHMA, Basal cell carcinoma of face (05/2004), Bowel habit changes (01/03/2019), Cancer ([facility]) (approx 2015 last), Cataract (2018), Colon polyp, hyperplastic (5/04), CVA (cerebral vascular accident) ([facility]) (01/2003), Diabetes ([facility]), Dysphagia (2013), GOUT, Heart burn, Hemorrhagic disorder ([facility]), HYPOTHYROIDISM (2014), Indigestion, Left knee pain (01/03/2019), Rosacea, and TIA (12/2000, 2017).    SURGICAL HISTORY   has a past surgical history that includes colonoscopy with polyp (5/2004); tonsillectomy (as child); microlaryngoscopy (1/21/2014); reconstruction, knee, acl (Right, 10/2002); knee arthroscopy (Left, 2013); and knee arthroplasty total (Left, 1/14/2019).    FAMILY HISTORY  Family History   Problem Relation Age of Onset    Diabetes Mother     Genetic Disorder Mother   "       dementia    Heart Disease Sister     Cancer Sister         breast cancer    Diabetes Maternal Aunt     Diabetes Maternal Grandmother     Diabetes Maternal Grandfather     Genetic Disorder Son         ALS       SOCIAL HISTORY  Social History     Tobacco Use    Smoking status: Former     Packs/day: 1.00     Years: 10.00     Pack years: 10.00     Types: Cigarettes     Quit date: 1/15/1963     Years since quittin.4    Smokeless tobacco: Never    Tobacco comments:     quit . h/o 1 ppd x 10 years   Vaping Use    Vaping Use: Never used   Substance and Sexual Activity    Alcohol use: Yes     Comment: 1 per day    Drug use: No    Sexual activity: Yes     Partners: Female       CURRENT MEDICATIONS  Home Medications       Reviewed by Elena Stephenson R.N. (Registered Nurse) on 23 at 1209  Med List Status: Not Addressed     Medication Last Dose Status   albuterol 108 (90 Base) MCG/ACT Aero Soln inhalation aerosol  Active   Azelaic Acid (FINACEA) 15 % Gel  Active   clopidogrel (PLAVIX) 75 MG TABS  Active   doxycycline (VIBRAMYCIN) 100 MG Cap  Active   Efinaconazole (JUBLIA) 10 % Solution  Active   FLOVENT  MCG/ACT AERO  Active   fluticasone (FLONASE) 50 MCG/ACT nasal spray  Active   glycerin, adult, 2.1 GM suppository  Active   levothyroxine (SYNTHROID) 150 MCG Tab  Active   metronidazole (METROGEL) 1 % gel  Active   multivitamin (THERAGRAN) Tab  Active   POLYETHYLENE GLYCOL 3350-GRX PO  Active   Probiotic Product (PROBIOTIC PO)  Active   ranitidine (ZANTAC) 300 MG tablet  Active                    ALLERGIES  Allergies   Allergen Reactions    Penicillins Anaphylaxis       PHYSICAL EXAM  VITAL SIGNS: /71   Pulse 66   Temp 37.3 °C (99.2 °F) (Temporal)   Resp 18   Ht 1.727 m (5' 8\")   Wt 86.5 kg (190 lb 11.2 oz)   SpO2 97%   BMI 29.00 kg/m²    Examination of the right upper extremity reveals that he is neurovascular intact.  Over the dorsum of the right elbow a little lateral to the olecranon " he has a stellate approximately 9 cm total laceration with some skin missing centrally and some active oozing and contamination noted    DIAGNOSTIC STUDIES / PROCEDURES  No indication for imaging    COURSE & MEDICAL DECISION MAKING    ED Observation Status? No; Patient does not meet criteria for ED Observation.     INITIAL ASSESSMENT, COURSE AND PLAN  Care Narrative: Patient presents with a stellate laceration 9 cm in total length with some active oozing which was controlled with pressure.  Ultimately we soaked the wound as he had some contamination and subsequently cleansed it aggressively with soap and water and then performed Steri-Strip closure as follows:    Laceration Repair Procedure Note    Indication: Laceration    Procedure: The patient was placed in the appropriate position and anesthesia around the laceration was not performed at the patient's request. The wound was minimally contaminated .The area was then cleansed using soap and water after soaking. The laceration was closed with steri strips. There were no additional lacerations requiring repair. The wound area was then dressed with a sterile dressing.      Total repaired wound length: 9 cm.     Other Items: None    The patient tolerated the procedure well.    Complications: None     ADDITIONAL PROBLEM LIST  Anticoagulated.  Continue Plavix in spite of this event    DISPOSITION AND DISCUSSIONS    Escalation of care considered, and ultimately not performed:diagnostic imaging consider repeat imaging but unnecessary as there is no deformity or difficulty with range of motion    Patient and his wife had questions about Plavix therapy which I think he can continue.  He is given instructions to follow-up with his doctor in the next week or 2 to ensure things are going well with this wound    FINAL DIAGNOSIS  1. Laceration of right upper extremity with complication, initial encounter    2. Anticoagulated           Electronically signed by: Harmeet Whelan M.D.,  6/24/2023 1:15 PM

## 2023-08-27 ENCOUNTER — HOSPITAL ENCOUNTER (EMERGENCY)
Facility: MEDICAL CENTER | Age: 88
End: 2023-08-27
Attending: EMERGENCY MEDICINE
Payer: MEDICARE

## 2023-08-27 VITALS
HEIGHT: 68 IN | SYSTOLIC BLOOD PRESSURE: 128 MMHG | RESPIRATION RATE: 16 BRPM | DIASTOLIC BLOOD PRESSURE: 72 MMHG | WEIGHT: 191.8 LBS | TEMPERATURE: 97.7 F | OXYGEN SATURATION: 95 % | HEART RATE: 82 BPM | BODY MASS INDEX: 29.07 KG/M2

## 2023-08-27 DIAGNOSIS — S81.811A LACERATION OF CALF, RIGHT, INITIAL ENCOUNTER: ICD-10-CM

## 2023-08-27 DIAGNOSIS — S81.811A LACERATION OF RIGHT LOWER EXTREMITY, INITIAL ENCOUNTER: ICD-10-CM

## 2023-08-27 PROCEDURE — 99284 EMERGENCY DEPT VISIT MOD MDM: CPT

## 2023-08-27 PROCEDURE — 303747 HCHG EXTRA SUTURE

## 2023-08-27 PROCEDURE — 304999 HCHG REPAIR-SIMPLE/INTERMED LEVEL 1

## 2023-08-27 PROCEDURE — 700101 HCHG RX REV CODE 250: Performed by: EMERGENCY MEDICINE

## 2023-08-27 PROCEDURE — 304217 HCHG IRRIGATION SYSTEM

## 2023-08-27 RX ORDER — LIDOCAINE HYDROCHLORIDE AND EPINEPHRINE 10; 10 MG/ML; UG/ML
10 INJECTION, SOLUTION INFILTRATION; PERINEURAL ONCE
Status: COMPLETED | OUTPATIENT
Start: 2023-08-27 | End: 2023-08-27

## 2023-08-27 RX ORDER — CEPHALEXIN 500 MG/1
500 CAPSULE ORAL 4 TIMES DAILY
Qty: 20 CAPSULE | Refills: 0 | Status: ACTIVE | OUTPATIENT
Start: 2023-08-27 | End: 2023-09-01

## 2023-08-27 RX ADMIN — LIDOCAINE HYDROCHLORIDE,EPINEPHRINE BITARTRATE 10 ML: 10; .01 INJECTION, SOLUTION INFILTRATION; PERINEURAL at 10:15

## 2023-08-27 NOTE — ED PROVIDER NOTES
ED Provider Note    CHIEF COMPLAINT  Chief Complaint   Patient presents with    Laceration     Dogs shock collar shocked him on back of right leg, resulting in him hitting his left shoulder on the wall.  Skin tear on left shoulder and small puncture wound on back of right left.   No head injury or GLF.        EXTERNAL RECORDS REVIEWED  Other I reviewed the patient's prior antibiotic orders.  Patient had Ancef in January 2019 without reaction.    HPI/ROS  LIMITATION TO HISTORY   Select: : None  OUTSIDE HISTORIAN(S):  none    James Hoffman is a 88 y.o. male on Plavix who presents for evaluation of right calf injury.  Patient states his dog wears a prong collar and the prongs got stuck and punctured his right calf prior to arrival.  Patient was unable to assess the injury and his wife was not home so he came to the ER.    Patient was able to walk without difficulty.  He states he believes his tetanus is up-to-date.    PAST MEDICAL HISTORY   has a past medical history of Allergic rhinitis, ASTHMA, Basal cell carcinoma of face (05/2004), Bowel habit changes (01/03/2019), Cancer ([facility]) (approx 2015 last), Cataract (2018), Colon polyp, hyperplastic (5/04), CVA (cerebral vascular accident) ([facility]) (01/2003), Diabetes ([facility]), Dysphagia (2013), GOUT, Heart burn, Hemorrhagic disorder ([facility]), HYPOTHYROIDISM (2014), Indigestion, Left knee pain (01/03/2019), Rosacea, and TIA (12/2000, 2017).    SURGICAL HISTORY   has a past surgical history that includes colonoscopy with polyp (5/2004); tonsillectomy (as child); microlaryngoscopy (1/21/2014); reconstruction, knee, acl (Right, 10/2002); knee arthroscopy (Left, 2013); and knee arthroplasty total (Left, 1/14/2019).    FAMILY HISTORY  Family History   Problem Relation Age of Onset    Diabetes Mother     Genetic Disorder Mother         dementia    Heart Disease Sister     Cancer Sister         breast cancer    Diabetes Maternal Aunt     Diabetes Maternal Grandmother     Diabetes Maternal  "Grandfather     Genetic Disorder Son         ALS       SOCIAL HISTORY  Social History     Tobacco Use    Smoking status: Former     Current packs/day: 0.00     Average packs/day: 1 pack/day for 10.0 years (10.0 ttl pk-yrs)     Types: Cigarettes     Start date: 1/15/1953     Quit date: 1/15/1963     Years since quittin.6    Smokeless tobacco: Never    Tobacco comments:     quit . h/o 1 ppd x 10 years   Vaping Use    Vaping Use: Never used   Substance and Sexual Activity    Alcohol use: Yes     Comment: 1 per day    Drug use: No    Sexual activity: Yes     Partners: Female       CURRENT MEDICATIONS  Plavix      ALLERGIES  Allergies   Allergen Reactions    Penicillins Anaphylaxis       PHYSICAL EXAM  VITAL SIGNS: /72   Pulse 82   Temp 36.5 °C (97.7 °F) (Temporal)   Resp 16   Ht 1.727 m (5' 8\")   Wt 87 kg (191 lb 12.8 oz)   SpO2 95%   BMI 29.16 kg/m²    General:  WDWN elderly male, nontoxic appearing in NAD; A+Ox3; V/S as above  Skin: warm and dry; good color; no rash  HEENT: NCAT; EOMs intact; no scleral icterus   Neck: FROM  Extremities: HERRERA x 4; posterior aspect of the right calf with 1 puncture wound, 3 cm superficial laceration and 2 smaller flap lacerations without active bleeding or evidence of foreign body; Achilles tendon intact; dorsi/plantar flexion intact  Neurologic: CNs III-XII grossly intact; speech clear; distal sensation intact; strength 5/5 UE/LEs  Psychiatric: Appropriate affect, normal mood      DIAGNOSTIC STUDIES / PROCEDURES  RADIOLOGY  Pt declined x-ray stating he does not believe that there is a piece of the metal prong collar within his wounds    COURSE & MEDICAL DECISION MAKING    ED Observation Status? No; Patient does not meet criteria for ED Observation.     INITIAL ASSESSMENT, COURSE AND PLAN  Care Narrative: 87 y/o male with injury to posterior aspect of right calf.  UTD on Td.  Patient is neurovascularly intact.  There is no active bleeding.  I do not suspect " arterial injury.  The patient declines x-ray to evaluate for metallic foreign body.  Patient's wound was irrigated and no foreign body was noted.  See suture repair note as below.  Patient be placed on Keflex to prevent infection given the mechanism and location.  Wound care return precautions were discussed.  Patient will have sutures removed in 7 to 10 days.    Laceration Repair Procedure Note    Indication: Lacerations right calf    Procedure: The patient was placed in the appropriate position on his right side and anesthesia around the lacerations were obtained by infiltration using 5.0 cc of 1% Lidocaine with epinephrine. The area was then irrigated with high pressure normal saline, explored with no foreign bodies discovered and no tendon injury noted, and draped in a sterile fashion. The laceration was closed with 4-0 Ethilon using interrupted sutures. A second laceration was closed with 4-0 Ethilon using interrupted sutures.  A third laceration was closed with 4-0 Ethilon using interrupted sutures. The wound area was then dressed with bacitracin and a bandage.      Total repaired wound length: 8 cm.     Other Items: Suture count: 10    The patient tolerated the procedure well.    Complications: None    ADDITIONAL PROBLEM LIST  On antiplatelet therapy    DISPOSITION AND DISCUSSIONSDiscussion of management with other Q or appropriate source(s): Pharmacy regarding prior Ancef administration.  Pt tolerated this in 2019.      FINAL DIAGNOSIS  1. Laceration of calf, right, initial encounter    2. Laceration of right lower extremity, initial encounter           Electronically signed by: Jazmin Roman M.D., 8/27/2023 9:17 AM       NOT SUICIDAL

## 2023-08-27 NOTE — DISCHARGE INSTRUCTIONS
Keep wound clean and dry while stitches are present.    Watch for signs of infection such as redness, swelling, drainage, fever, chills, or other concerns.  Please return if any of these are noted.    Wound check with your primary care doctor in 2 to 3 days.  Sutures will need to be removed in 7 to 10 days.  You may have your primary care doctor do this or go to urgent care or return to the ER.    Take Keflex to prevent infection.  You have tolerated Ancef before which is closely related to Keflex, closer than penicillin.

## 2023-08-27 NOTE — ED TRIAGE NOTES
Chief Complaint   Patient presents with    Laceration     Dogs shock collar shocked him on back of right leg, resulting in him hitting his left shoulder on the wall.  Skin tear on left shoulder and small puncture wound on back of right left.   No head injury or GLF.      Pt ambulated back with steady gait. Left arm wound bleeding slightly, leg wound bleeding under control.     Pt takes Plavix.

## 2023-08-27 NOTE — ED NOTES
Posterior right calf laceration cleaned with NS. Large laceration in the middle with multiple smaller puncture wounds. Area busied and swollen.Pt tolerated well.

## 2023-09-05 ENCOUNTER — HOSPITAL ENCOUNTER (EMERGENCY)
Facility: MEDICAL CENTER | Age: 88
End: 2023-09-05
Attending: EMERGENCY MEDICINE
Payer: MEDICARE

## 2023-09-05 VITALS
DIASTOLIC BLOOD PRESSURE: 69 MMHG | WEIGHT: 195.11 LBS | SYSTOLIC BLOOD PRESSURE: 111 MMHG | TEMPERATURE: 97.3 F | BODY MASS INDEX: 29.57 KG/M2 | HEART RATE: 75 BPM | OXYGEN SATURATION: 97 % | HEIGHT: 68 IN | RESPIRATION RATE: 18 BRPM

## 2023-09-05 DIAGNOSIS — L03.115 CELLULITIS OF RIGHT LOWER EXTREMITY: ICD-10-CM

## 2023-09-05 PROCEDURE — 99284 EMERGENCY DEPT VISIT MOD MDM: CPT

## 2023-09-05 PROCEDURE — A9270 NON-COVERED ITEM OR SERVICE: HCPCS | Performed by: EMERGENCY MEDICINE

## 2023-09-05 PROCEDURE — 99281 EMR DPT VST MAYX REQ PHY/QHP: CPT

## 2023-09-05 PROCEDURE — 15853 REMOVAL SUTR/STAPL XREQ ANES: CPT

## 2023-09-05 PROCEDURE — 700102 HCHG RX REV CODE 250 W/ 637 OVERRIDE(OP): Performed by: EMERGENCY MEDICINE

## 2023-09-05 RX ORDER — CLINDAMYCIN HYDROCHLORIDE 300 MG/1
300 CAPSULE ORAL 3 TIMES DAILY
Qty: 15 CAPSULE | Refills: 0 | Status: ACTIVE | OUTPATIENT
Start: 2023-09-05 | End: 2023-09-05 | Stop reason: SDUPTHER

## 2023-09-05 RX ORDER — CLINDAMYCIN HYDROCHLORIDE 150 MG/1
300 CAPSULE ORAL ONCE
Status: COMPLETED | OUTPATIENT
Start: 2023-09-05 | End: 2023-09-05

## 2023-09-05 RX ORDER — CLINDAMYCIN HYDROCHLORIDE 300 MG/1
300 CAPSULE ORAL 3 TIMES DAILY
Qty: 15 CAPSULE | Refills: 0 | Status: ACTIVE | OUTPATIENT
Start: 2023-09-05 | End: 2023-09-10

## 2023-09-05 RX ADMIN — CLINDAMYCIN HYDROCHLORIDE 300 MG: 150 CAPSULE ORAL at 10:16

## 2023-09-05 NOTE — DISCHARGE INSTRUCTIONS
Turn to the emergency department if have increasing swelling, redness, fever or discharge from the wound.  I added antibiotics to your regimen and I do recommend you pick them up at [facility] today to start your regimen this afternoon as you need medications 3 times daily.  Please continue take all the medications as prescribed.

## 2023-09-05 NOTE — ED TRIAGE NOTES
Pt comes in for suture removal that were placed Sunday 8/27/2023  was injured by his dogs collar to back of R leg calf area   wife noticed site red with slight swelling   tender to touch    pt will be evaluated by MD for this  taken to [facility] now

## 2023-09-05 NOTE — ED PROVIDER NOTES
ED Provider Note    CHIEF COMPLAINT  Chief Complaint   Patient presents with    Suture Removal     Here for suture removal  sutures placed Sunday 8/27/2023   was cut by dog collar   site is red and tender   per pt wife noticed it turning red yesterday        EXTERNAL RECORDS REVIEWED  I did review the emergency medicine note that was completed on 8/27/2023 for suture placement.    HPI/ROCIO    James Hoffman is a 88 y.o. male who presents with complaint of right calf pain.  He had a laceration to the right lower extremity on 8/27/2023 and had sutures placed.  The patient was placed on Keflex for 5 days.  He states his redness, slight swelling and pain at the area.  Denies fever, or discharge.  His tetanus booster is current.    PAST MEDICAL HISTORY   has a past medical history of Allergic rhinitis, ASTHMA, Basal cell carcinoma of face (05/2004), Bowel habit changes (01/03/2019), Cancer ([facility]) (approx 2015 last), Cataract (2018), Colon polyp, hyperplastic (5/04), CVA (cerebral vascular accident) ([facility]) (01/2003), Diabetes ([facility]), Dysphagia (2013), GOUT, Heart burn, Hemorrhagic disorder ([facility]), HYPOTHYROIDISM (2014), Indigestion, Left knee pain (01/03/2019), Rosacea, and TIA (12/2000, 2017).    SURGICAL HISTORY   has a past surgical history that includes colonoscopy with polyp (5/2004); tonsillectomy (as child); microlaryngoscopy (1/21/2014); reconstruction, knee, acl (Right, 10/2002); knee arthroscopy (Left, 2013); and knee arthroplasty total (Left, 1/14/2019).    FAMILY HISTORY  Family History   Problem Relation Age of Onset    Diabetes Mother     Genetic Disorder Mother         dementia    Heart Disease Sister     Cancer Sister         breast cancer    Diabetes Maternal Aunt     Diabetes Maternal Grandmother     Diabetes Maternal Grandfather     Genetic Disorder Son         ALS       SOCIAL HISTORY  Social History     Tobacco Use    Smoking status: Former     Current packs/day: 0.00     Average packs/day: 1 pack/day for 10.0  "years (10.0 ttl pk-yrs)     Types: Cigarettes     Start date: 1/15/1953     Quit date: 1/15/1963     Years since quittin.6    Smokeless tobacco: Never    Tobacco comments:     quit . h/o 1 ppd x 10 years   Vaping Use    Vaping Use: Never used   Substance and Sexual Activity    Alcohol use: Yes     Comment: 1 per day    Drug use: No    Sexual activity: Yes     Partners: Female       CURRENT MEDICATIONS  Home Medications       Reviewed by Lalita Bass R.N. (Registered Nurse) on 23 at 0958  Med List Status: Partial     Medication Last Dose Status   albuterol 108 (90 Base) MCG/ACT Aero Soln inhalation aerosol  Active   Azelaic Acid (FINACEA) 15 % Gel  Active   clopidogrel (PLAVIX) 75 MG TABS  Active   doxycycline (VIBRAMYCIN) 100 MG Cap  Active   Efinaconazole (JUBLIA) 10 % Solution  Active   FLOVENT  MCG/ACT AERO  Active   fluticasone (FLONASE) 50 MCG/ACT nasal spray  Active   glycerin, adult, 2.1 GM suppository  Active   levothyroxine (SYNTHROID) 150 MCG Tab  Active   metronidazole (METROGEL) 1 % gel  Active   multivitamin (THERAGRAN) Tab  Active   POLYETHYLENE GLYCOL 3350-GRX PO  Active   Probiotic Product (PROBIOTIC PO)  Active   ranitidine (ZANTAC) 300 MG tablet  Active                    ALLERGIES  Allergies   Allergen Reactions    Penicillins Anaphylaxis    Neosporin [Bacitracin-Polymyxin B] Unspecified     Turned his arm red        PHYSICAL EXAM  VITAL SIGNS: /67   Pulse 77   Temp 36.8 °C (98.3 °F) (Temporal)   Resp 16   Ht 1.727 m (5' 8\")   Wt 88.5 kg (195 lb 1.7 oz)   SpO2 96%   BMI 29.67 kg/m²      Constitutional: Well developed, Well nourished, No acute distress, Non-toxic appearance.   Skin: Right posterior calf laceration with sutures in place, surrounding erythema, no discharge, no fluctuance, no induration   Extremities: No edema, findings as above, distal cap refills less than 2 seconds, soft compartments, no fluctuance neurologic: Alert & oriented x 3,  No focal " deficits noted, normal gait.      Right lower extremity posterior calf      COURSE & MEDICAL DECISION MAKING    ED Observation Status? No; Patient does not meet criteria for ED Observation.     INITIAL ASSESSMENT, COURSE AND PLAN  Care Narrative: This is a 88-year-old male presents with suture removal.  He has slight erythema suspicious for cellulitis.  No [place] abscess.  For this reason, a single agent was prescribed in the form of clindamycin here and prescription for the same.  He recently finished Keflex but it did not completely eradicate infection.  The patient has need to return to the emergency [place] for evidence of abscess or increased infections that he may need required IV antibiotics and surgical intervention.  Currently he does not have a catastrophic condition requiring IV antibiotics.    Sutures removed secondary the fact that he may be a nidus of infection.  DISPOSITION AND DISCUSSIONS      Decision tools and prescription drugs considered including, but not limited to:  The patient does not have a condition that is catastrophic or appears to be toxic, patient is not a sepsis, I do not believe he requires IV antibiotics or surgical consultation currently.  He may need this in the future but not currently .    FINAL DIAGNOSIS  1. Cellulitis of right lower extremity      DISPOSITION:  Patient will be discharged home in stable condition.    FOLLOW UP:  [facility], Emergency Dept  [address]  [place] [address]  [phone]    If symptoms worsen    Patrica Garcia M.D.  [address]  [address]  [place] [address]  [phone]    Schedule an appointment as soon as possible for a visit in 3 days  As needed      OUTPATIENT MEDICATIONS:  Discharge Medication List as of 9/5/2023 10:19 AM        START taking these medications    Details   clindamycin (CLEOCIN) 300 MG Cap Take 1 Capsule by mouth 3 times a day for 5 days., Disp-15 Capsule, R-0, Normal                Electronically signed by: Casa Sanchez D.O., 9/5/2023 10:03 AM

## 2023-09-13 ENCOUNTER — APPOINTMENT (RX ONLY)
Dept: URBAN - METROPOLITAN AREA CLINIC 15 | Facility: CLINIC | Age: 88
Setting detail: DERMATOLOGY
End: 2023-09-13

## 2023-09-13 DIAGNOSIS — L27.0 GENERALIZED SKIN ERUPTION DUE TO DRUGS AND MEDICAMENTS TAKEN INTERNALLY: ICD-10-CM | Status: INADEQUATELY CONTROLLED

## 2023-09-13 PROBLEM — L30.9 DERMATITIS, UNSPECIFIED: Status: ACTIVE | Noted: 2023-09-13

## 2023-09-13 PROCEDURE — ? ADDITIONAL NOTES

## 2023-09-13 PROCEDURE — 99203 OFFICE O/P NEW LOW 30 MIN: CPT

## 2023-09-13 PROCEDURE — ? COUNSELING

## 2023-09-13 PROCEDURE — ? TREATMENT REGIMEN

## 2023-09-13 ASSESSMENT — LOCATION ZONE DERM: LOCATION ZONE: TRUNK

## 2023-09-13 ASSESSMENT — LOCATION DETAILED DESCRIPTION DERM
LOCATION DETAILED: STERNUM
LOCATION DETAILED: RIGHT MEDIAL UPPER BACK

## 2023-09-13 ASSESSMENT — LOCATION SIMPLE DESCRIPTION DERM
LOCATION SIMPLE: RIGHT UPPER BACK
LOCATION SIMPLE: CHEST

## 2023-09-13 NOTE — PROCEDURE: ADDITIONAL NOTES
Additional Notes: Patient was taking Amoxicillin, then cephalexin for a surgery he had done a few weeks ago, patient took his last dose a couple days ago he was unsure 1-2 days ago. His abx course is finished. \\nThe rash started around the same time. Instructed patient to let his PCP know about the full body rash that is present. Instructed to avoid hot water, itching the rash and to take Zyrtec, dc Triamcinolone as he has been using for weeks with no breaks and return to the clinic in two weeks if the rash has not improved.
Render Risk Assessment In Note?: no
Detail Level: Detailed

## 2023-09-13 NOTE — PROCEDURE: TREATMENT REGIMEN
Discontinue Regimen: Triamcinolone 0.1% cream
Detail Level: Detailed
Initiate Treatment: Zyrtec 10 mg 1 tab 2-3 times a day x7 days\\nCerave ant itch cream

## 2023-09-13 NOTE — HPI: RASH
What Type Of Note Output Would You Prefer (Optional)?: Standard Output
How Severe Is Your Rash?: moderate
Is This A New Presentation, Or A Follow-Up?: Rash
Additional History: Patient stated that the triamcinolone helps at first, but then the rash comes back

## 2023-09-27 ENCOUNTER — APPOINTMENT (RX ONLY)
Dept: URBAN - METROPOLITAN AREA CLINIC 15 | Facility: CLINIC | Age: 88
Setting detail: DERMATOLOGY
End: 2023-09-27

## 2023-09-27 DIAGNOSIS — L29.89 OTHER PRURITUS: ICD-10-CM | Status: INADEQUATELY CONTROLLED

## 2023-09-27 DIAGNOSIS — L27.0 GENERALIZED SKIN ERUPTION DUE TO DRUGS AND MEDICAMENTS TAKEN INTERNALLY: ICD-10-CM | Status: INADEQUATELY CONTROLLED

## 2023-09-27 DIAGNOSIS — Z71.89 OTHER SPECIFIED COUNSELING: ICD-10-CM

## 2023-09-27 DIAGNOSIS — L29.8 OTHER PRURITUS: ICD-10-CM | Status: INADEQUATELY CONTROLLED

## 2023-09-27 PROBLEM — L30.9 DERMATITIS, UNSPECIFIED: Status: ACTIVE | Noted: 2023-09-27

## 2023-09-27 PROCEDURE — ? ADDITIONAL NOTES

## 2023-09-27 PROCEDURE — ? TREATMENT REGIMEN

## 2023-09-27 PROCEDURE — ? COUNSELING

## 2023-09-27 PROCEDURE — 99213 OFFICE O/P EST LOW 20 MIN: CPT

## 2023-09-27 ASSESSMENT — LOCATION SIMPLE DESCRIPTION DERM
LOCATION SIMPLE: RIGHT UPPER BACK
LOCATION SIMPLE: CHEST
LOCATION SIMPLE: CHEST

## 2023-09-27 ASSESSMENT — LOCATION DETAILED DESCRIPTION DERM
LOCATION DETAILED: RIGHT SUPERIOR UPPER BACK
LOCATION DETAILED: RIGHT LATERAL SUPERIOR CHEST
LOCATION DETAILED: STERNUM
LOCATION DETAILED: RIGHT MEDIAL UPPER BACK

## 2023-09-27 ASSESSMENT — LOCATION ZONE DERM
LOCATION ZONE: TRUNK
LOCATION ZONE: TRUNK

## 2023-09-27 NOTE — PROCEDURE: ADDITIONAL NOTES
Render Risk Assessment In Note?: no
Detail Level: Detailed
Additional Notes: Send patient for lightbox therapy for pruritis at [facility].
Additional Notes: Originally seen for a rash after having surgery where he was on multiple antibiotics. Originally thought this was a drug eruption rash. He has been off antibiotics for months. Also realized rash was exacerbated by taking 1000 mg of vitamin B and a multivitamin. Discussed patient stopping all supplementation to see if rash improves. Will also set up for light box therapy. There is no evidence of rash today. However, there are urticarial plaques and excoriations from patient itching.

## 2023-09-27 NOTE — PROCEDURE: TREATMENT REGIMEN
Continue Regimen: Continue Tricimalone BID for 2 weeks then discontinue.
Detail Level: Detailed
Initiate Treatment: Zyrtec 10 mg 1 tab 1-2 times a day x7 days, pt states after the 7 days it seemed to improve but rash has since returned. \\nCerave ant itch cream worked for about 6-7 hours. Pt applying morning and night.\\nPt instructed to stop multivitamin. Continue 2 Zyrtec a day for two weeks then drop to one.

## 2023-10-09 ENCOUNTER — APPOINTMENT (RX ONLY)
Dept: URBAN - METROPOLITAN AREA CLINIC 4 | Facility: CLINIC | Age: 88
Setting detail: DERMATOLOGY
End: 2023-10-09

## 2023-10-09 DIAGNOSIS — L29.89 OTHER PRURITUS: ICD-10-CM

## 2023-10-09 DIAGNOSIS — L29.8 OTHER PRURITUS: ICD-10-CM

## 2023-10-09 PROCEDURE — ? PHOTOTHERAPY TREATMENT

## 2023-10-09 PROCEDURE — 96900 ACTINOTHERAPY UV LIGHT: CPT

## 2023-10-09 NOTE — PROCEDURE: PHOTOTHERAPY TREATMENT
Protocol For Uva: The patient received UVA.
Protocol For Nbuvb: Hands/Feet: The patient received NBUVB.
Protocol For Photochemotherapy For Severe Photoresponsive Dermatoses: Puva: The patient received Photochemotherapy for severe photoresponsive dermatoses: PUVA requiring at least 4 to 8 hours of care under direct physician supervision.
Protocol For Photochemotherapy: Mineral Oil And Broad Band Uvb: The patient received Photochemotherapy: Mineral Oil and Broad Band UVB.
Protocol For Bath Puva: The patient received Bath PUVA.
Protocol For Photochemotherapy: Tar And Nbuvb (Goeckerman Treatment): The patient received Photochemotherapy: Tar and NBUVB (Goeckerman treatment).
Protocol For Photochemotherapy: Triamcinolone Ointment And Nbuvb: The patient received Photochemotherapy: Triamcinolone and NBUVB (triamcinolone ointment applied to all lesions prior to phototherapy).
Protocol For Uva1: The patient received UVA1.
Protocol: NBUVB
Protocol For Protocol For Photochemotherapy For Severe Photoresponsive Dermatoses: Bath Puva: The patient received Photochemotherapy for severe photoresponsive dermatoses: Bath PUVA requiring at least 4 to 8 hours of care under direct physician supervision.
Protocol For Photochemotherapy For Severe Photoresponsive Dermatoses: Tar And Nbuvb (Goeckerman Treatment): The patient received Photochemotherapy for severe photoresponsive dermatoses: Tar and NBUVB (Goeckerman treatment) requiring at least 4 to 8 hours of care under direct physician supervision.
Protocol For Broad Band Uvb: The patient received Broad Band UVB.
Post-Care Instructions: I reviewed with the patient in detail post-care instructions. Patient is to wear sun protection. Patients may expect sunburn like redness, discomfort and scabbing.
Protocol For Photochemotherapy For Severe Photoresponsive Dermatoses: Tar And Broad Band Uvb (Goeckerman Treatment): The patient received Photochemotherapy for severe photoresponsive dermatoses: Tar and Broad Band UVB (Goeckerman treatment) requiring at least 4 to 8 hours of care under direct physician supervision.
Detail Level: Generalized
Total Treatment Time: 15 seconds
Changes In Treatment Protocol: Initial treatment
Protocol For Photochemotherapy: Petrolatum And Nbuvb: The patient received Photochemotherapy: Petrolatum and NBUVB (petrolatum applied to all lesions prior to phototherapy).
Protocol For Photochemotherapy: Tar And Broad Band Uvb (Goeckerman Treatment): The patient received Photochemotherapy: Tar and Broad Band UVB (Goeckerman treatment).
Protocol For Photochemotherapy: Baby Oil And Nbuvb: The patient received Photochemotherapy: Baby Oil and NBUVB (baby oil applied to all lesions prior to phototherapy).
Protocol For Photochemotherapy For Severe Photoresponsive Dermatoses: Petrolatum And Broad Band Uvb: The patient received Photochemotherapyfor severe photoresponsive dermatoses: Petrolatum and Broad Band UVB requiring at least 4 to 8 hours of care under direct physician supervision.
Protocol For Photochemotherapy For Severe Photoresponsive Dermatoses: Petrolatum And Nbuvb: The patient received Photochemotherapy for severe photoresponsive dermatoses: Petrolatum and NBUVB requiring at least 4 to 8 hours of care under direct physician supervision.
Consent: Written consent obtained.  The risks were reviewed with the patient including but not limited to: burn, pigmentary changes, pain, blistering, scabbing, redness, increased risk of skin cancers, and the remote possibility of scarring.
Name Of Supervising Technician: Rita Raygoza MA
Protocol For Puva: The patient received PUVA.
Treatment Number: 1
Protocol For Nb Uva: The patient received NB UVA.
Render Post-Care In The Note: no
Protocol For Photochemotherapy: Mineral Oil And Nbuvb: The patient received Photochemotherapy: Mineral Oil and NBUVB (mineral oil applied to all lesions prior to phototherapy).
Protocol For Photochemotherapy: Petrolatum And Broad Band Uvb: The patient received Photochemotherapy: Petrolatum and Broad Band UVB.

## 2023-10-11 ENCOUNTER — APPOINTMENT (RX ONLY)
Dept: URBAN - METROPOLITAN AREA CLINIC 4 | Facility: CLINIC | Age: 88
Setting detail: DERMATOLOGY
End: 2023-10-11

## 2023-10-11 DIAGNOSIS — L29.8 OTHER PRURITUS: ICD-10-CM

## 2023-10-11 DIAGNOSIS — L29.89 OTHER PRURITUS: ICD-10-CM

## 2023-10-11 PROCEDURE — 96900 ACTINOTHERAPY UV LIGHT: CPT

## 2023-10-11 PROCEDURE — ? PHOTOTHERAPY TREATMENT

## 2023-10-11 NOTE — PROCEDURE: PHOTOTHERAPY TREATMENT
Protocol For Uva: The patient received UVA.
Protocol For Nbuvb: Hands/Feet: The patient received NBUVB.
Skin Type: I
Protocol For Photochemotherapy For Severe Photoresponsive Dermatoses: Puva: The patient received Photochemotherapy for severe photoresponsive dermatoses: PUVA requiring at least 4 to 8 hours of care under direct physician supervision.
Protocol For Photochemotherapy: Mineral Oil And Broad Band Uvb: The patient received Photochemotherapy: Mineral Oil and Broad Band UVB.
Protocol For Bath Puva: The patient received Bath PUVA.
Protocol For Photochemotherapy: Tar And Nbuvb (Goeckerman Treatment): The patient received Photochemotherapy: Tar and NBUVB (Goeckerman treatment).
Comments On Previous Treatment: Patient denies any redness or discomfort from previous treatment.
Protocol For Photochemotherapy: Triamcinolone Ointment And Nbuvb: The patient received Photochemotherapy: Triamcinolone and NBUVB (triamcinolone ointment applied to all lesions prior to phototherapy).
Protocol For Uva1: The patient received UVA1.
Protocol: NBUVB
Protocol For Protocol For Photochemotherapy For Severe Photoresponsive Dermatoses: Bath Puva: The patient received Photochemotherapy for severe photoresponsive dermatoses: Bath PUVA requiring at least 4 to 8 hours of care under direct physician supervision.
Protocol For Photochemotherapy For Severe Photoresponsive Dermatoses: Tar And Nbuvb (Goeckerman Treatment): The patient received Photochemotherapy for severe photoresponsive dermatoses: Tar and NBUVB (Goeckerman treatment) requiring at least 4 to 8 hours of care under direct physician supervision.
Protocol For Broad Band Uvb: The patient received Broad Band UVB.
Post-Care Instructions: I reviewed with the patient in detail post-care instructions. Patient is to wear sun protection. Patients may expect sunburn like redness, discomfort and scabbing.
Protocol For Photochemotherapy For Severe Photoresponsive Dermatoses: Tar And Broad Band Uvb (Goeckerman Treatment): The patient received Photochemotherapy for severe photoresponsive dermatoses: Tar and Broad Band UVB (Goeckerman treatment) requiring at least 4 to 8 hours of care under direct physician supervision.
Detail Level: Generalized
Total Treatment Time: 30 seconds
Changes In Treatment Protocol: Per protocol, time increased 15 seconds.
Protocol For Photochemotherapy: Petrolatum And Nbuvb: The patient received Photochemotherapy: Petrolatum and NBUVB (petrolatum applied to all lesions prior to phototherapy).
Protocol For Photochemotherapy: Tar And Broad Band Uvb (Goeckerman Treatment): The patient received Photochemotherapy: Tar and Broad Band UVB (Goeckerman treatment).
Protocol For Photochemotherapy: Baby Oil And Nbuvb: The patient received Photochemotherapy: Baby Oil and NBUVB (baby oil applied to all lesions prior to phototherapy).
Protocol For Photochemotherapy For Severe Photoresponsive Dermatoses: Petrolatum And Broad Band Uvb: The patient received Photochemotherapyfor severe photoresponsive dermatoses: Petrolatum and Broad Band UVB requiring at least 4 to 8 hours of care under direct physician supervision.
Protocol For Photochemotherapy For Severe Photoresponsive Dermatoses: Petrolatum And Nbuvb: The patient received Photochemotherapy for severe photoresponsive dermatoses: Petrolatum and NBUVB requiring at least 4 to 8 hours of care under direct physician supervision.
Consent: Written consent obtained.  The risks were reviewed with the patient including but not limited to: burn, pigmentary changes, pain, blistering, scabbing, redness, increased risk of skin cancers, and the remote possibility of scarring.
Name Of Supervising Technician: Rita Raygoza MA
Protocol For Puva: The patient received PUVA.
Treatment Number: 2
Protocol For Nb Uva: The patient received NB UVA.
Render Post-Care In The Note: no
Protocol For Photochemotherapy: Mineral Oil And Nbuvb: The patient received Photochemotherapy: Mineral Oil and NBUVB (mineral oil applied to all lesions prior to phototherapy).
Protocol For Photochemotherapy: Petrolatum And Broad Band Uvb: The patient received Photochemotherapy: Petrolatum and Broad Band UVB.

## 2023-10-13 ENCOUNTER — APPOINTMENT (RX ONLY)
Dept: URBAN - METROPOLITAN AREA CLINIC 4 | Facility: CLINIC | Age: 88
Setting detail: DERMATOLOGY
End: 2023-10-13

## 2023-10-13 ENCOUNTER — APPOINTMENT (OUTPATIENT)
Dept: RADIOLOGY | Facility: MEDICAL CENTER | Age: 88
End: 2023-10-13
Attending: EMERGENCY MEDICINE
Payer: MEDICARE

## 2023-10-13 ENCOUNTER — OFFICE VISIT (OUTPATIENT)
Dept: URGENT CARE | Facility: CLINIC | Age: 88
End: 2023-10-13
Payer: MEDICARE

## 2023-10-13 ENCOUNTER — HOSPITAL ENCOUNTER (EMERGENCY)
Facility: MEDICAL CENTER | Age: 88
End: 2023-10-13
Attending: EMERGENCY MEDICINE
Payer: MEDICARE

## 2023-10-13 VITALS
RESPIRATION RATE: 16 BRPM | OXYGEN SATURATION: 96 % | TEMPERATURE: 97.8 F | HEART RATE: 76 BPM | SYSTOLIC BLOOD PRESSURE: 124 MMHG | HEIGHT: 68 IN | BODY MASS INDEX: 29.55 KG/M2 | WEIGHT: 195 LBS | DIASTOLIC BLOOD PRESSURE: 80 MMHG

## 2023-10-13 VITALS
OXYGEN SATURATION: 98 % | BODY MASS INDEX: 29.27 KG/M2 | WEIGHT: 193.12 LBS | SYSTOLIC BLOOD PRESSURE: 135 MMHG | HEART RATE: 68 BPM | TEMPERATURE: 98.2 F | DIASTOLIC BLOOD PRESSURE: 85 MMHG | RESPIRATION RATE: 20 BRPM | HEIGHT: 68 IN

## 2023-10-13 DIAGNOSIS — L29.8 OTHER PRURITUS: ICD-10-CM

## 2023-10-13 DIAGNOSIS — L29.89 OTHER PRURITUS: ICD-10-CM

## 2023-10-13 DIAGNOSIS — L08.9 FINGER INFECTION: ICD-10-CM

## 2023-10-13 DIAGNOSIS — L03.012 CELLULITIS OF FINGER OF LEFT HAND: ICD-10-CM

## 2023-10-13 LAB
ALBUMIN SERPL BCP-MCNC: 4.1 G/DL (ref 3.2–4.9)
ALBUMIN/GLOB SERPL: 1.6 G/DL
ALP SERPL-CCNC: 94 U/L (ref 30–99)
ALT SERPL-CCNC: 10 U/L (ref 2–50)
ANION GAP SERPL CALC-SCNC: 11 MMOL/L (ref 7–16)
AST SERPL-CCNC: 11 U/L (ref 12–45)
BASOPHILS # BLD AUTO: 0.5 % (ref 0–1.8)
BASOPHILS # BLD: 0.04 K/UL (ref 0–0.12)
BILIRUB SERPL-MCNC: 0.5 MG/DL (ref 0.1–1.5)
BUN SERPL-MCNC: 18 MG/DL (ref 8–22)
CALCIUM ALBUM COR SERPL-MCNC: 9.6 MG/DL (ref 8.5–10.5)
CALCIUM SERPL-MCNC: 9.7 MG/DL (ref 8.4–10.2)
CHLORIDE SERPL-SCNC: 108 MMOL/L (ref 96–112)
CO2 SERPL-SCNC: 22 MMOL/L (ref 20–33)
CREAT SERPL-MCNC: 0.9 MG/DL (ref 0.5–1.4)
CRP SERPL HS-MCNC: 0.57 MG/DL (ref 0–0.75)
EOSINOPHIL # BLD AUTO: 0.18 K/UL (ref 0–0.51)
EOSINOPHIL NFR BLD: 2 % (ref 0–6.9)
ERYTHROCYTE [DISTWIDTH] IN BLOOD BY AUTOMATED COUNT: 48.5 FL (ref 35.9–50)
ERYTHROCYTE [SEDIMENTATION RATE] IN BLOOD BY WESTERGREN METHOD: 3 MM/HOUR (ref 0–20)
GFR SERPLBLD CREATININE-BSD FMLA CKD-EPI: 82 ML/MIN/1.73 M 2
GLOBULIN SER CALC-MCNC: 2.5 G/DL (ref 1.9–3.5)
GLUCOSE SERPL-MCNC: 103 MG/DL (ref 65–99)
HCT VFR BLD AUTO: 48 % (ref 42–52)
HGB BLD-MCNC: 15.7 G/DL (ref 14–18)
IMM GRANULOCYTES # BLD AUTO: 0.05 K/UL (ref 0–0.11)
IMM GRANULOCYTES NFR BLD AUTO: 0.6 % (ref 0–0.9)
LYMPHOCYTES # BLD AUTO: 1.46 K/UL (ref 1–4.8)
LYMPHOCYTES NFR BLD: 16.5 % (ref 22–41)
MCH RBC QN AUTO: 30 PG (ref 27–33)
MCHC RBC AUTO-ENTMCNC: 32.7 G/DL (ref 32.3–36.5)
MCV RBC AUTO: 91.8 FL (ref 81.4–97.8)
MONOCYTES # BLD AUTO: 0.77 K/UL (ref 0–0.85)
MONOCYTES NFR BLD AUTO: 8.7 % (ref 0–13.4)
NEUTROPHILS # BLD AUTO: 6.35 K/UL (ref 1.82–7.42)
NEUTROPHILS NFR BLD: 71.7 % (ref 44–72)
NRBC # BLD AUTO: 0 K/UL
NRBC BLD-RTO: 0 /100 WBC (ref 0–0.2)
PLATELET # BLD AUTO: 241 K/UL (ref 164–446)
PMV BLD AUTO: 9.7 FL (ref 9–12.9)
POTASSIUM SERPL-SCNC: 4.2 MMOL/L (ref 3.6–5.5)
PROT SERPL-MCNC: 6.6 G/DL (ref 6–8.2)
RBC # BLD AUTO: 5.23 M/UL (ref 4.7–6.1)
SODIUM SERPL-SCNC: 141 MMOL/L (ref 135–145)
WBC # BLD AUTO: 8.9 K/UL (ref 4.8–10.8)

## 2023-10-13 PROCEDURE — 73140 X-RAY EXAM OF FINGER(S): CPT | Mod: LT

## 2023-10-13 PROCEDURE — 700101 HCHG RX REV CODE 250: Performed by: EMERGENCY MEDICINE

## 2023-10-13 PROCEDURE — 87070 CULTURE OTHR SPECIMN AEROBIC: CPT

## 2023-10-13 PROCEDURE — 3074F SYST BP LT 130 MM HG: CPT | Performed by: PHYSICIAN ASSISTANT

## 2023-10-13 PROCEDURE — 99284 EMERGENCY DEPT VISIT MOD MDM: CPT

## 2023-10-13 PROCEDURE — 87205 SMEAR GRAM STAIN: CPT

## 2023-10-13 PROCEDURE — 303977 HCHG I & D

## 2023-10-13 PROCEDURE — 85652 RBC SED RATE AUTOMATED: CPT

## 2023-10-13 PROCEDURE — 85025 COMPLETE CBC W/AUTO DIFF WBC: CPT

## 2023-10-13 PROCEDURE — 36415 COLL VENOUS BLD VENIPUNCTURE: CPT

## 2023-10-13 PROCEDURE — ? PHOTOTHERAPY TREATMENT

## 2023-10-13 PROCEDURE — 3079F DIAST BP 80-89 MM HG: CPT | Performed by: PHYSICIAN ASSISTANT

## 2023-10-13 PROCEDURE — 99203 OFFICE O/P NEW LOW 30 MIN: CPT | Performed by: PHYSICIAN ASSISTANT

## 2023-10-13 PROCEDURE — 96900 ACTINOTHERAPY UV LIGHT: CPT

## 2023-10-13 PROCEDURE — 80053 COMPREHEN METABOLIC PANEL: CPT

## 2023-10-13 PROCEDURE — 86140 C-REACTIVE PROTEIN: CPT

## 2023-10-13 RX ORDER — CEPHALEXIN 500 MG/1
500 CAPSULE ORAL 4 TIMES DAILY
Qty: 20 CAPSULE | Refills: 0 | Status: ACTIVE | OUTPATIENT
Start: 2023-10-13 | End: 2023-10-18

## 2023-10-13 RX ORDER — SULFAMETHOXAZOLE AND TRIMETHOPRIM 800; 160 MG/1; MG/1
1 TABLET ORAL EVERY 12 HOURS
Qty: 10 TABLET | Refills: 0 | Status: ACTIVE | OUTPATIENT
Start: 2023-10-13 | End: 2023-10-18

## 2023-10-13 RX ADMIN — LIDOCAINE HYDROCHLORIDE 20 ML: 10 INJECTION, SOLUTION INFILTRATION; PERINEURAL at 16:29

## 2023-10-13 NOTE — ED PROVIDER NOTES
ER Provider Note    Scribed for Vern Cordon M.d. by Kapil Villarreal. 10/13/2023  3:50 PM    Primary Care Provider: Patrica Garcia M.D.    CHIEF COMPLAINT  Chief Complaint   Patient presents with    Wound Check     87 yo male ambulates to triage with reports of here for left pinky finger infections.  Patient reports a couple weeks ago it started as a pimple like wound and has since become red and swollen.  Went to  and told to come for further eval      LIMITATION TO HISTORY   Select: : None    HPI/ROS  OUTSIDE HISTORIAN(S):  None    EXTERNAL RECORDS REVIEWED  Outpatient Notes Patient was seen in Urgent Care earlier today and was sent here for further evaluation.    James Hoffman is a 88 y.o. male who presents to the ED for evaluation of a left pinky finger wound onset 2 weeks. The patient states that the area around his left pinky finger appears infected and he reports that the infection began as a pimple and has since become more red and swollen. Patient states that he went to Urgent Care and was prompted to come to the ED for further evaluation. Patient states he has a history of Gout and reports some joint pain in his left pinky finger. He denies any fever. He reports he is allergic to Penicillins. Patient has a history of CVA and Diabetes.    PAST MEDICAL HISTORY  Past Medical History:   Diagnosis Date    Allergic rhinitis     ASTHMA     Basal cell carcinoma of face 05/2004    Dr. Lucas    Bowel habit changes 01/03/2019    Constipation    Cancer ([facility]) approx 2015 last    Basal cell of face.    Cataract 2018    Bilateral phaco with IOL    Colon polyp, hyperplastic 5/04    CVA (cerebral vascular accident) ([facility]) 01/2003    Residual diplopia, Dr. Cleaning    Diabetes ([facility])     diet controlled. 1/3/19-does not check glucose at home.    Dysphagia 2013    Dr. Joseph, vallecular lesion removed    GOUT     Heart burn     treated with zantac.    Hemorrhagic disorder ([facility])     Due to plavix. Bleeds and bruises  readily.     HYPOTHYROIDISM 2014    Indigestion     treated with zantac    Left knee pain 01/03/2019    with activity    Rosacea     Dr. Lucas    TIA 12/2000, 2017       SURGICAL HISTORY  Past Surgical History:   Procedure Laterality Date    KNEE ARTHROPLASTY TOTAL Left 1/14/2019    Procedure: KNEE ARTHROPLASTY TOTAL;  Surgeon: Jean Claude Meyer M.D.;  Location: SURGERY [facility];  Service: Orthopedics    MICROLARYNGOSCOPY  1/21/2014    Performed by Jimbo Joseph M.D. at SURGERY SAME DAY [facility]    KNEE ARTHROSCOPY Left 2013    COLONOSCOPY WITH POLYP  5/2004    Hyperplastic    RECONSTRUCTION, KNEE, ACL Right 10/2002     Dr. Engle    TONSILLECTOMY  as child       FAMILY HISTORY  Family History   Problem Relation Age of Onset    Diabetes Mother     Genetic Disorder Mother         dementia    Heart Disease Sister     Cancer Sister         breast cancer    Diabetes Maternal Aunt     Diabetes Maternal Grandmother     Diabetes Maternal Grandfather     Genetic Disorder Son         ALS       SOCIAL HISTORY   reports that he quit smoking about 60 years ago. His smoking use included cigarettes. He started smoking about 70 years ago. He has a 10.0 pack-year smoking history. He has never used smokeless tobacco. He reports current alcohol use. He reports that he does not use drugs.    CURRENT MEDICATIONS  Previous Medications    ALBUTEROL 108 (90 BASE) MCG/ACT AERO SOLN INHALATION AEROSOL    Inhale 2 Puffs by mouth every 6 hours as needed for Shortness of Breath.    AZELAIC ACID (FINACEA) 15 % GEL    by Apply externally route every day.    CLOPIDOGREL (PLAVIX) 75 MG TABS    Take 1 Tab by mouth every day.    DOXYCYCLINE (VIBRAMYCIN) 100 MG CAP    Take 100 mg by mouth every day. ACNE    EFINACONAZOLE (JUBLIA) 10 % SOLUTION    by Apply externally route every day.    FLOVENT  MCG/ACT AERO    INHALE 2 PUFFS BY MOUTH 2TIMES A DAY.    FLUTICASONE (FLONASE) 50 MCG/ACT NASAL SPRAY    Spray 2 Sprays in nose every  "day.    GLYCERIN, ADULT, 2.1 GM SUPPOSITORY    Insert 1 Suppository in rectum 1 time daily as needed (Constipation).    LEVOTHYROXINE (SYNTHROID) 150 MCG TAB    Take 150 mcg by mouth Every morning on an empty stomach.    METRONIDAZOLE (METROGEL) 1 % GEL    Apply 1 Application to affected area(s) every day. Apply to nose    MULTIVITAMIN (THERAGRAN) TAB    Take 1 Tab by mouth every day.    POLYETHYLENE GLYCOL 3350-GRX PO    Take  by mouth as needed.    PROBIOTIC PRODUCT (PROBIOTIC PO)    Take 1 Cap by mouth every day.    RANITIDINE (ZANTAC) 300 MG TABLET    Take 300 mg by mouth every evening.       ALLERGIES  Penicillins and Neosporin [bacitracin-polymyxin b]    PHYSICAL EXAM  BP (!) 142/88   Pulse 66   Temp 36.8 °C (98.2 °F) (Temporal)   Resp 18   Ht 1.727 m (5' 8\")   Wt 87.6 kg (193 lb 2 oz)   SpO2 98%   BMI 29.36 kg/m²     Constitutional: Well developed, Well nourished, mild distress.   HENT: Normocephalic, Atraumatic   Eyes: Conjunctiva normal, No discharge.   Cardiovascular: Normal heart rate, Normal rhythm, No murmurs, equal pulses.   Pulmonary: Normal breath sounds, No respiratory distress, No wheezing, No rales, No rhonchi.  Musculoskeletal: No major deformities noted, patient has tenderness to the left pinky where there is erythema and warmth.  Skin: Warm, Dry, Left 5th finger has erythema and warmth extending over the dorsum of finger. Lateral aspect of DIP joint has a little white raised bump. Almost appears like gouty tophus.  Neurologic: Alert & oriented x 3, Normal motor function,  No focal deficits noted.   Psychiatric: Affect normal, Judgment normal, Mood normal.     DIAGNOSTIC STUDIES & PROCEDURES    Labs:   Results for orders placed or performed during the hospital encounter of 10/13/23   CBC WITH DIFFERENTIAL   Result Value Ref Range    WBC 8.9 4.8 - 10.8 K/uL    RBC 5.23 4.70 - 6.10 M/uL    Hemoglobin 15.7 14.0 - 18.0 g/dL    Hematocrit 48.0 42.0 - 52.0 %    MCV 91.8 81.4 - 97.8 fL    MCH " 30.0 27.0 - 33.0 pg    MCHC 32.7 32.3 - 36.5 g/dL    RDW 48.5 35.9 - 50.0 fL    Platelet Count 241 164 - 446 K/uL    MPV 9.7 9.0 - 12.9 fL    Neutrophils-Polys 71.70 44.00 - 72.00 %    Lymphocytes 16.50 (L) 22.00 - 41.00 %    Monocytes 8.70 0.00 - 13.40 %    Eosinophils 2.00 0.00 - 6.90 %    Basophils 0.50 0.00 - 1.80 %    Immature Granulocytes 0.60 0.00 - 0.90 %    Nucleated RBC 0.00 0.00 - 0.20 /100 WBC    Neutrophils (Absolute) 6.35 1.82 - 7.42 K/uL    Lymphs (Absolute) 1.46 1.00 - 4.80 K/uL    Monos (Absolute) 0.77 0.00 - 0.85 K/uL    Eos (Absolute) 0.18 0.00 - 0.51 K/uL    Baso (Absolute) 0.04 0.00 - 0.12 K/uL    Immature Granulocytes (abs) 0.05 0.00 - 0.11 K/uL    NRBC (Absolute) 0.00 K/uL   COMP METABOLIC PANEL   Result Value Ref Range    Sodium 141 135 - 145 mmol/L    Potassium 4.2 3.6 - 5.5 mmol/L    Chloride 108 96 - 112 mmol/L    Co2 22 20 - 33 mmol/L    Anion Gap 11.0 7.0 - 16.0    Glucose 103 (H) 65 - 99 mg/dL    Bun 18 8 - 22 mg/dL    Creatinine 0.90 0.50 - 1.40 mg/dL    Calcium 9.7 8.4 - 10.2 mg/dL    Correct Calcium 9.6 8.5 - 10.5 mg/dL    AST(SGOT) 11 (L) 12 - 45 U/L    ALT(SGPT) 10 2 - 50 U/L    Alkaline Phosphatase 94 30 - 99 U/L    Total Bilirubin 0.5 0.1 - 1.5 mg/dL    Albumin 4.1 3.2 - 4.9 g/dL    Total Protein 6.6 6.0 - 8.2 g/dL    Globulin 2.5 1.9 - 3.5 g/dL    A-G Ratio 1.6 g/dL   CRP QUANTITIVE (NON-CARDIAC)   Result Value Ref Range    Stat C-Reactive Protein 0.57 0.00 - 0.75 mg/dL   ESTIMATED GFR   Result Value Ref Range    GFR (CKD-EPI) 82 >60 mL/min/1.73 m 2     All labs reviewed by me.    Radiology:   The attending Emergency Physician has independently interpreted the diagnostic imaging associated with this visit and is awaiting the final reading from the radiologist, which will be displayed below.    Preliminary interpretation is a follows: X-ray does not show any bony destruction  Radiologist interpretation:    DX-FINGER(S) 2+ LEFT   Final Result         Soft tissue swelling about the  fifth digit. No definite osseous destruction seen.      No specific radiographic findings for osteomyelitis. However, plain film is very insensitive to detect early osteomyelitis. If there is clinical concern, further evaluation with MRI is recommended.              INCISION AND DRAINAGE PROCEDURE NOTE:  Patient identification was confirmed and consent was obtained.  This procedure was performed by Dr. Cordon at 4:28 PM.  Site: Left 5th finger  Sterile procedures observed: betadine used to prep area  Needle size: 18 gauge  Anesthetic used (type and amt): Lidocaine without epinephrine 8 mL  Blade size: 18-gauge needle  Drainage: Crystal-like material. No pus  Site anesthetized, a needle was used to aspirate, white crystal like material.  The wound covered with dry, sterile dressing. Pt tolerated procedure well without complications. Instructions for care discussed verbally and pt provided with additional written instructions for homecare and f/u.    COURSE & MEDICAL DECISION MAKING    ED Observation Status? no    INITIAL ASSESSMENT AND PLAN  Care Narrative:       3:50 PM - Patient seen and evaluated at bedside. Ordered DX-finger left, CMP, CBC w diff, Culture wound w gram stain, CRP Quantitive, and Sed Rate to evaluate. He understands and agrees to the plan of care. Differential diagnoses include but are not limited to: Gouty tophus, pseudogout, cellulitis    4:28 PM - Incision and drainage procedure performed by me as outlined above.     4:55 PM - Patient was reevaluated at bedside. Discussed lab and radiology results with the patient and informed them that the x-ray appeared normal and it is likely a calcium or gout deposit  in the skin. I informed him of the infection of his skin and his prescription for 2 antibiotics. Patient had the opportunity to ask any questions. The plan for discharge was discussed with them and they were told to return for any new or worsening symptoms. He was also informed of the  plans for follow up. Patient is understanding and agreeable to the plan for discharge.     PROBLEM LIST AND DISPOSITION  Finger pain at this point time I think the patient most likely had a pseudogout or crystal buildup in the soft tissue with now a secondary cellulitis over the finger.  Needle aspiration was done and this showed more of a crystal like material and not pus.  At this point time we will start patient on Bactrim and Keflex.  He has tolerated these medications before.  He does not appear to be septic.  X-ray was done and does not show any signs of osteomyelitis.               DISPOSITION AND DISCUSSIONS  I have discussed management of the patient with the following physicians and CELY's: None    Discussion of management with other Q or appropriate source(s): None     Barriers to care at this time, including but not limited to:  None .     Decision tools and prescription drugs considered including, but not limited to: Antibiotics Keflex and Bactrim DS .    The patient will return for new or worsening symptoms and is stable at the time of discharge.    The patient is referred to a primary physician for blood pressure management, diabetic screening, and for all other preventative health concerns.    DISPOSITION:  Patient will be discharged home in stable condition.    FOLLOW UP:  Patrica Garcia M.D.  [address] [facility]  [address]  [facility] [id number] [phone]    Schedule an appointment as soon as possible for a visit in 1 week      OUTPATIENT MEDICATIONS:  New Prescriptions    CEPHALEXIN (KEFLEX) 500 MG CAP    Take 1 Capsule by mouth 4 times a day for 5 days.    SULFAMETHOXAZOLE-TRIMETHOPRIM (BACTRIM DS) 800-160 MG TABLET    Take 1 Tablet by mouth every 12 hours for 5 days.     FINAL IMPRESSION   1. Cellulitis of finger of left hand    2. Incision and drainage procedure     I, Kapil Villarreal (Scribe), am scribing for, and in the presence of, Vern Cordon M.D.    Electronically signed by: Kapil  Phil (Scribe), 10/13/2023    IVern M.D personally performed the services described in this documentation, as scribed by Kapil Villarreal in my presence, and it is both accurate and complete.    The note accurately reflects work and decisions made by me.  Vern Cordon M.D.  10/13/2023  5:59 PM

## 2023-10-13 NOTE — PROGRESS NOTES
Subjective     James Hoffman is a 88 y.o. male who presents with Finger Pain (Left hand pinky possible infection)            HPI  This is a new problem.  The patient presents to clinic complaining of a possible infection to his left fifth digit.  The patient states he has been experiencing pain, swelling, and redness to the left fifth digit over the past several days, which has gradually progressed.  The patient states he developed a pimple-like lesion near the distal end of his left fifth digit.  P the atient states this has gradually progressed.  The patient states his entire left fifth digit is painful, swollen, and red.  The patient reports no discharge/drainage from the pimple-like lesion.  The patient states he is experiencing decreased range of motion of his left fifth digit secondary to pain and swelling.  The patient reports no associated fever.  The patient has not been taking any OTC medications for his current symptoms.    PMH:  has a past medical history of Allergic rhinitis, ASTHMA, Basal cell carcinoma of face (05/2004), Bowel habit changes (01/03/2019), Cancer ([facility]) (approx 2015 last), Cataract (2018), Colon polyp, hyperplastic (5/04), CVA (cerebral vascular accident) ([facility]) (01/2003), Diabetes ([facility]), Dysphagia (2013), GOUT, Heart burn, Hemorrhagic disorder ([facility]), HYPOTHYROIDISM (2014), Indigestion, Left knee pain (01/03/2019), Rosacea, and TIA (12/2000, 2017).  MEDS:   Current Outpatient Medications:     glycerin, adult, 2.1 GM suppository, Insert 1 Suppository in rectum 1 time daily as needed (Constipation)., Disp: 12 Suppository, Rfl: 0    albuterol 108 (90 Base) MCG/ACT Aero Soln inhalation aerosol, Inhale 2 Puffs by mouth every 6 hours as needed for Shortness of Breath., Disp: , Rfl:     POLYETHYLENE GLYCOL 3350-GRX PO, Take  by mouth as needed., Disp: , Rfl:     Azelaic Acid (FINACEA) 15 % Gel, by Apply externally route every day., Disp: , Rfl:     Efinaconazole (JUBLIA) 10 % Solution, by Apply  externally route every day., Disp: , Rfl:     levothyroxine (SYNTHROID) 150 MCG Tab, Take 150 mcg by mouth Every morning on an empty stomach., Disp: , Rfl:     multivitamin (THERAGRAN) Tab, Take 1 Tab by mouth every day., Disp: , Rfl:     Probiotic Product (PROBIOTIC PO), Take 1 Cap by mouth every day., Disp: , Rfl:     metronidazole (METROGEL) 1 % gel, Apply 1 Application to affected area(s) every day. Apply to nose, Disp: , Rfl:     ranitidine (ZANTAC) 300 MG tablet, Take 300 mg by mouth every evening., Disp: , Rfl:     doxycycline (VIBRAMYCIN) 100 MG Cap, Take 100 mg by mouth every day. ACNE, Disp: , Rfl:     fluticasone (FLONASE) 50 MCG/ACT nasal spray, Spray 2 Sprays in nose every day., Disp: 3 Bottle, Rfl: 1    clopidogrel (PLAVIX) 75 MG TABS, Take 1 Tab by mouth every day., Disp: 90 Tab, Rfl: 3    FLOVENT  MCG/ACT AERO, INHALE 2 PUFFS BY MOUTH 2TIMES A DAY., Disp: 1 Inhaler, Rfl: 8  ALLERGIES:   Allergies   Allergen Reactions    Penicillins Anaphylaxis    Neosporin [Bacitracin-Polymyxin B] Unspecified     Turned his arm red      SURGHX:   Past Surgical History:   Procedure Laterality Date    KNEE ARTHROPLASTY TOTAL Left 1/14/2019    Procedure: KNEE ARTHROPLASTY TOTAL;  Surgeon: Jean Claude Meyer M.D.;  Location: SURGERY [facility];  Service: Orthopedics    MICROLARYNGOSCOPY  1/21/2014    Performed by Jimbo Joseph M.D. at SURGERY SAME DAY [facility]    KNEE ARTHROSCOPY Left 2013    COLONOSCOPY WITH POLYP  5/2004    Hyperplastic    RECONSTRUCTION, KNEE, ACL Right 10/2002     Dr. Engle    TONSILLECTOMY  as child     SOCHX:  reports that he quit smoking about 60 years ago. His smoking use included cigarettes. He started smoking about 70 years ago. He has a 10.0 pack-year smoking history. He has never used smokeless tobacco. He reports current alcohol use. He reports that he does not use drugs.  FH: Family history was reviewed, no pertinent findings to report    Review of Systems   Skin:          "+ infection of 5th digit              Objective     /80 (BP Location: Right arm, Patient Position: Sitting, BP Cuff Size: Adult long)   Pulse 76   Temp 36.6 °C (97.8 °F) (Temporal)   Resp 16   Ht 1.727 m (5' 8\")   Wt 88.5 kg (195 lb)   SpO2 96%   BMI 29.65 kg/m²      Physical Exam  Constitutional:       General: He is not in acute distress.     Appearance: Normal appearance. He is well-developed. He is not ill-appearing.   HENT:      Head: Normocephalic and atraumatic.      Right Ear: External ear normal.      Left Ear: External ear normal.   Eyes:      Extraocular Movements: Extraocular movements intact.      Conjunctiva/sclera: Conjunctivae normal.   Cardiovascular:      Rate and Rhythm: Normal rate.   Pulmonary:      Effort: Pulmonary effort is normal.   Musculoskeletal:      Cervical back: Normal range of motion and neck supple.      Comments:   Left 5th Digit:  Tenderness to the left 5th digit with diffuse edema and erythema extending to the 5th MCP joint of the left hand.   A localized area of increased tenderness is present to the distal aspect of the left fifth digit near the DIP joint with slight palpable fluctuance.  This is concerning for a possible abscess.  No active discharge/drainage.  Decreased ROM -the patient demonstrates limited range of motion of the left fifth digit secondary to pain and swelling  Neurovascular intact distally  Strength -not assessed secondary to pain   Skin:     General: Skin is warm and dry.   Neurological:      Mental Status: He is alert and oriented to person, place, and time.                             Assessment & Plan          1. Finger infection    The patient's presenting symptoms and physical exam findings are consistent with an acute infection of the left fifth digit.  Based on the patient's presenting symptoms and physical exam findings, I am concerned about possible tenosynovitis.  As a result, I believe the patient would benefit from a higher level of " care.  Therefore, I recommend the patient be transferred to the [facility] ED for further evaluation management.  The patient agrees with this plan.  The patient is stable for transfer via private vehicle at this time.  Advised the patient of the associated risks of not seeking further care for his current symptoms, and he verbalized understanding.       Plan:  Transfer patient to the [facility] ED for further evaluation and management.     Please note that this dictation was created using voice recognition software. I have made every reasonable attempt to correct obvious errors, but I expect that there may be errors of grammar and possibly content that I did not discover before finalizing the note.     This note was electronically signed by Shabnam Peoples PA-C

## 2023-10-13 NOTE — DISCHARGE INSTRUCTIONS
Take all your antibiotics until gone.  Return to the emergency department if you have increasing swelling of the finger, redness going up the hand, the whole finger is swollen like a sausage or you have fever.

## 2023-10-13 NOTE — PROCEDURE: PHOTOTHERAPY TREATMENT
Protocol For Uva: The patient received UVA.
Protocol For Nbuvb: Hands/Feet: The patient received NBUVB.
Skin Type: I
Protocol For Photochemotherapy For Severe Photoresponsive Dermatoses: Puva: The patient received Photochemotherapy for severe photoresponsive dermatoses: PUVA requiring at least 4 to 8 hours of care under direct physician supervision.
Protocol For Photochemotherapy: Mineral Oil And Broad Band Uvb: The patient received Photochemotherapy: Mineral Oil and Broad Band UVB.
Protocol For Bath Puva: The patient received Bath PUVA.
Protocol For Photochemotherapy: Tar And Nbuvb (Goeckerman Treatment): The patient received Photochemotherapy: Tar and NBUVB (Goeckerman treatment).
Comments On Previous Treatment: Patient denies any redness or discomfort from previous treatment.
Protocol For Photochemotherapy: Triamcinolone Ointment And Nbuvb: The patient received Photochemotherapy: Triamcinolone and NBUVB (triamcinolone ointment applied to all lesions prior to phototherapy).
Protocol For Uva1: The patient received UVA1.
Protocol: NBUVB
Protocol For Protocol For Photochemotherapy For Severe Photoresponsive Dermatoses: Bath Puva: The patient received Photochemotherapy for severe photoresponsive dermatoses: Bath PUVA requiring at least 4 to 8 hours of care under direct physician supervision.
Protocol For Photochemotherapy For Severe Photoresponsive Dermatoses: Tar And Nbuvb (Goeckerman Treatment): The patient received Photochemotherapy for severe photoresponsive dermatoses: Tar and NBUVB (Goeckerman treatment) requiring at least 4 to 8 hours of care under direct physician supervision.
Protocol For Broad Band Uvb: The patient received Broad Band UVB.
Post-Care Instructions: I reviewed with the patient in detail post-care instructions. Patient is to wear sun protection. Patients may expect sunburn like redness, discomfort and scabbing.
Protocol For Photochemotherapy For Severe Photoresponsive Dermatoses: Tar And Broad Band Uvb (Goeckerman Treatment): The patient received Photochemotherapy for severe photoresponsive dermatoses: Tar and Broad Band UVB (Goeckerman treatment) requiring at least 4 to 8 hours of care under direct physician supervision.
Detail Level: Generalized
Total Treatment Time: 45 seconds
Changes In Treatment Protocol: Per protocol, time increased 15 seconds.
Protocol For Photochemotherapy: Petrolatum And Nbuvb: The patient received Photochemotherapy: Petrolatum and NBUVB (petrolatum applied to all lesions prior to phototherapy).
Protocol For Photochemotherapy: Tar And Broad Band Uvb (Goeckerman Treatment): The patient received Photochemotherapy: Tar and Broad Band UVB (Goeckerman treatment).
Protocol For Photochemotherapy: Baby Oil And Nbuvb: The patient received Photochemotherapy: Baby Oil and NBUVB (baby oil applied to all lesions prior to phototherapy).
Protocol For Photochemotherapy For Severe Photoresponsive Dermatoses: Petrolatum And Broad Band Uvb: The patient received Photochemotherapyfor severe photoresponsive dermatoses: Petrolatum and Broad Band UVB requiring at least 4 to 8 hours of care under direct physician supervision.
Protocol For Photochemotherapy For Severe Photoresponsive Dermatoses: Petrolatum And Nbuvb: The patient received Photochemotherapy for severe photoresponsive dermatoses: Petrolatum and NBUVB requiring at least 4 to 8 hours of care under direct physician supervision.
Consent: Written consent obtained.  The risks were reviewed with the patient including but not limited to: burn, pigmentary changes, pain, blistering, scabbing, redness, increased risk of skin cancers, and the remote possibility of scarring.
Name Of Supervising Technician: Rita Raygoza MA
Protocol For Puva: The patient received PUVA.
Treatment Number: 3
Protocol For Nb Uva: The patient received NB UVA.
Render Post-Care In The Note: no
Protocol For Photochemotherapy: Mineral Oil And Nbuvb: The patient received Photochemotherapy: Mineral Oil and NBUVB (mineral oil applied to all lesions prior to phototherapy).
Protocol For Photochemotherapy: Petrolatum And Broad Band Uvb: The patient received Photochemotherapy: Petrolatum and Broad Band UVB.

## 2023-10-13 NOTE — ED TRIAGE NOTES
"Chief Complaint   Patient presents with    Wound Check     89 yo male ambulates to triage with reports of here for left pinky finger infections.  Patient reports a couple weeks ago it started as a pimple like wound and has since become red and swollen.  Went to  and told to come for further eval     BP (!) 142/88   Pulse 66   Temp 36.8 °C (98.2 °F) (Temporal)   Resp 18   Ht 1.727 m (5' 8\")   Wt 87.6 kg (193 lb 2 oz)   SpO2 98%   BMI 29.36 kg/m²     "

## 2023-10-14 LAB
GRAM STN SPEC: NORMAL
SIGNIFICANT IND 70042: NORMAL
SITE SITE: NORMAL
SOURCE SOURCE: NORMAL

## 2023-10-16 ENCOUNTER — APPOINTMENT (RX ONLY)
Dept: URBAN - METROPOLITAN AREA CLINIC 4 | Facility: CLINIC | Age: 88
Setting detail: DERMATOLOGY
End: 2023-10-16

## 2023-10-16 DIAGNOSIS — L29.8 OTHER PRURITUS: ICD-10-CM

## 2023-10-16 DIAGNOSIS — L29.89 OTHER PRURITUS: ICD-10-CM

## 2023-10-16 LAB
BACTERIA WND AEROBE CULT: NORMAL
GRAM STN SPEC: NORMAL
SIGNIFICANT IND 70042: NORMAL
SITE SITE: NORMAL
SOURCE SOURCE: NORMAL

## 2023-10-16 PROCEDURE — 96900 ACTINOTHERAPY UV LIGHT: CPT

## 2023-10-16 PROCEDURE — ? PHOTOTHERAPY TREATMENT

## 2023-10-16 NOTE — PROCEDURE: PHOTOTHERAPY TREATMENT
Protocol For Uva: The patient received UVA.
Protocol For Nbuvb: Hands/Feet: The patient received NBUVB.
Skin Type: I
Protocol For Photochemotherapy For Severe Photoresponsive Dermatoses: Puva: The patient received Photochemotherapy for severe photoresponsive dermatoses: PUVA requiring at least 4 to 8 hours of care under direct physician supervision.
Protocol For Photochemotherapy: Mineral Oil And Broad Band Uvb: The patient received Photochemotherapy: Mineral Oil and Broad Band UVB.
Protocol For Bath Puva: The patient received Bath PUVA.
Protocol For Photochemotherapy: Tar And Nbuvb (Goeckerman Treatment): The patient received Photochemotherapy: Tar and NBUVB (Goeckerman treatment).
Comments On Previous Treatment: Patient denies any redness or discomfort from previous treatment.
Protocol For Photochemotherapy: Triamcinolone Ointment And Nbuvb: The patient received Photochemotherapy: Triamcinolone and NBUVB (triamcinolone ointment applied to all lesions prior to phototherapy).
Protocol For Uva1: The patient received UVA1.
Protocol: NBUVB
Protocol For Protocol For Photochemotherapy For Severe Photoresponsive Dermatoses: Bath Puva: The patient received Photochemotherapy for severe photoresponsive dermatoses: Bath PUVA requiring at least 4 to 8 hours of care under direct physician supervision.
Protocol For Photochemotherapy For Severe Photoresponsive Dermatoses: Tar And Nbuvb (Goeckerman Treatment): The patient received Photochemotherapy for severe photoresponsive dermatoses: Tar and NBUVB (Goeckerman treatment) requiring at least 4 to 8 hours of care under direct physician supervision.
Protocol For Broad Band Uvb: The patient received Broad Band UVB.
Post-Care Instructions: I reviewed with the patient in detail post-care instructions. Patient is to wear sun protection. Patients may expect sunburn like redness, discomfort and scabbing.
Protocol For Photochemotherapy For Severe Photoresponsive Dermatoses: Tar And Broad Band Uvb (Goeckerman Treatment): The patient received Photochemotherapy for severe photoresponsive dermatoses: Tar and Broad Band UVB (Goeckerman treatment) requiring at least 4 to 8 hours of care under direct physician supervision.
Detail Level: Generalized
Total Treatment Time: 1 minute 0 seconds
Changes In Treatment Protocol: Per protocol, time increased 15 seconds.
Protocol For Photochemotherapy: Petrolatum And Nbuvb: The patient received Photochemotherapy: Petrolatum and NBUVB (petrolatum applied to all lesions prior to phototherapy).
Protocol For Photochemotherapy: Tar And Broad Band Uvb (Goeckerman Treatment): The patient received Photochemotherapy: Tar and Broad Band UVB (Goeckerman treatment).
Protocol For Photochemotherapy: Baby Oil And Nbuvb: The patient received Photochemotherapy: Baby Oil and NBUVB (baby oil applied to all lesions prior to phototherapy).
Protocol For Photochemotherapy For Severe Photoresponsive Dermatoses: Petrolatum And Broad Band Uvb: The patient received Photochemotherapyfor severe photoresponsive dermatoses: Petrolatum and Broad Band UVB requiring at least 4 to 8 hours of care under direct physician supervision.
Protocol For Photochemotherapy For Severe Photoresponsive Dermatoses: Petrolatum And Nbuvb: The patient received Photochemotherapy for severe photoresponsive dermatoses: Petrolatum and NBUVB requiring at least 4 to 8 hours of care under direct physician supervision.
Consent: Written consent obtained.  The risks were reviewed with the patient including but not limited to: burn, pigmentary changes, pain, blistering, scabbing, redness, increased risk of skin cancers, and the remote possibility of scarring.
Name Of Supervising Technician: Rita Raygoza MA
Protocol For Puva: The patient received PUVA.
Treatment Number: 4
Protocol For Nb Uva: The patient received NB UVA.
Render Post-Care In The Note: no
Protocol For Photochemotherapy: Mineral Oil And Nbuvb: The patient received Photochemotherapy: Mineral Oil and NBUVB (mineral oil applied to all lesions prior to phototherapy).
Protocol For Photochemotherapy: Petrolatum And Broad Band Uvb: The patient received Photochemotherapy: Petrolatum and Broad Band UVB.

## 2023-10-18 ENCOUNTER — APPOINTMENT (RX ONLY)
Dept: URBAN - METROPOLITAN AREA CLINIC 15 | Facility: CLINIC | Age: 88
Setting detail: DERMATOLOGY
End: 2023-10-18

## 2023-10-18 DIAGNOSIS — L29.89 OTHER PRURITUS: ICD-10-CM | Status: INADEQUATELY CONTROLLED

## 2023-10-18 DIAGNOSIS — L29.8 OTHER PRURITUS: ICD-10-CM | Status: INADEQUATELY CONTROLLED

## 2023-10-18 PROCEDURE — ? COUNSELING

## 2023-10-18 PROCEDURE — ? PRESCRIPTION

## 2023-10-18 PROCEDURE — 99214 OFFICE O/P EST MOD 30 MIN: CPT

## 2023-10-18 PROCEDURE — ? ADDITIONAL NOTES

## 2023-10-18 PROCEDURE — ? TREATMENT REGIMEN

## 2023-10-18 RX ORDER — TRIAMCINOLONE ACETONIDE 1 MG/G
1 CREAM TOPICAL BID
Qty: 453.6 | Refills: 1 | Status: ERX

## 2023-10-18 ASSESSMENT — LOCATION SIMPLE DESCRIPTION DERM
LOCATION SIMPLE: RIGHT UPPER BACK
LOCATION SIMPLE: CHEST

## 2023-10-18 ASSESSMENT — LOCATION DETAILED DESCRIPTION DERM
LOCATION DETAILED: RIGHT LATERAL SUPERIOR CHEST
LOCATION DETAILED: RIGHT INFERIOR MEDIAL UPPER BACK

## 2023-10-18 ASSESSMENT — LOCATION ZONE DERM
LOCATION ZONE: TRUNK
LOCATION ZONE: TRUNK

## 2023-10-18 NOTE — PROCEDURE: MIPS QUALITY
Quality 402: Tobacco Use And Help With Quitting Among Adolescents: Patient screened for tobacco and never smoked
Detail Level: Simple
Quality 431: Preventive Care And Screening: Unhealthy Alcohol Use - Screening: Patient not identified as an unhealthy alcohol user when screened for unhealthy alcohol use using a systematic screening method
Quality 226: Preventive Care And Screening: Tobacco Use: Screening And Cessation Intervention: Patient screened for tobacco use and is an ex/non-smoker
Quality 130: Documentation Of Current Medications In The Medical Record: Current Medications Documented

## 2023-10-18 NOTE — PROCEDURE: TREATMENT REGIMEN
Detail Level: Detailed
Initiate Treatment: triamcinolone acetonide 0.1 % topical cream BID\\nSig: Apply twice daily to itchy skin up to 2 weeks/month as needed. Avoid face, groin, and body folds.

## 2023-10-18 NOTE — PROCEDURE: ADDITIONAL NOTES
Render Risk Assessment In Note?: no
Detail Level: Detailed
Additional Notes: Discussed with pt that he will see Dr. Echevarria for consult and further treatment.

## 2023-10-20 ENCOUNTER — APPOINTMENT (RX ONLY)
Dept: URBAN - METROPOLITAN AREA CLINIC 4 | Facility: CLINIC | Age: 88
Setting detail: DERMATOLOGY
End: 2023-10-20

## 2023-10-20 DIAGNOSIS — L29.8 OTHER PRURITUS: ICD-10-CM

## 2023-10-20 DIAGNOSIS — L29.89 OTHER PRURITUS: ICD-10-CM

## 2023-10-20 PROCEDURE — ? PHOTOTHERAPY TREATMENT

## 2023-10-20 PROCEDURE — 96900 ACTINOTHERAPY UV LIGHT: CPT

## 2023-10-20 NOTE — PROCEDURE: PHOTOTHERAPY TREATMENT
Protocol For Uva: The patient received UVA.
Protocol For Nbuvb: Hands/Feet: The patient received NBUVB.
Skin Type: I
Protocol For Photochemotherapy For Severe Photoresponsive Dermatoses: Puva: The patient received Photochemotherapy for severe photoresponsive dermatoses: PUVA requiring at least 4 to 8 hours of care under direct physician supervision.
Protocol For Photochemotherapy: Mineral Oil And Broad Band Uvb: The patient received Photochemotherapy: Mineral Oil and Broad Band UVB.
Protocol For Bath Puva: The patient received Bath PUVA.
Protocol For Photochemotherapy: Tar And Nbuvb (Goeckerman Treatment): The patient received Photochemotherapy: Tar and NBUVB (Goeckerman treatment).
Comments On Previous Treatment: Patient denies any redness or discomfort from previous treatment.
Protocol For Photochemotherapy: Triamcinolone Ointment And Nbuvb: The patient received Photochemotherapy: Triamcinolone and NBUVB (triamcinolone ointment applied to all lesions prior to phototherapy).
Protocol For Uva1: The patient received UVA1.
Protocol: NBUVB
Protocol For Protocol For Photochemotherapy For Severe Photoresponsive Dermatoses: Bath Puva: The patient received Photochemotherapy for severe photoresponsive dermatoses: Bath PUVA requiring at least 4 to 8 hours of care under direct physician supervision.
Protocol For Photochemotherapy For Severe Photoresponsive Dermatoses: Tar And Nbuvb (Goeckerman Treatment): The patient received Photochemotherapy for severe photoresponsive dermatoses: Tar and NBUVB (Goeckerman treatment) requiring at least 4 to 8 hours of care under direct physician supervision.
Protocol For Broad Band Uvb: The patient received Broad Band UVB.
Post-Care Instructions: I reviewed with the patient in detail post-care instructions. Patient is to wear sun protection. Patients may expect sunburn like redness, discomfort and scabbing.
Protocol For Photochemotherapy For Severe Photoresponsive Dermatoses: Tar And Broad Band Uvb (Goeckerman Treatment): The patient received Photochemotherapy for severe photoresponsive dermatoses: Tar and Broad Band UVB (Goeckerman treatment) requiring at least 4 to 8 hours of care under direct physician supervision.
Detail Level: Generalized
Total Treatment Time: 1 minute 15 seconds
Changes In Treatment Protocol: Per protocol, time increased 15 seconds.
Protocol For Photochemotherapy: Petrolatum And Nbuvb: The patient received Photochemotherapy: Petrolatum and NBUVB (petrolatum applied to all lesions prior to phototherapy).
Protocol For Photochemotherapy: Tar And Broad Band Uvb (Goeckerman Treatment): The patient received Photochemotherapy: Tar and Broad Band UVB (Goeckerman treatment).
Protocol For Photochemotherapy: Baby Oil And Nbuvb: The patient received Photochemotherapy: Baby Oil and NBUVB (baby oil applied to all lesions prior to phototherapy).
Protocol For Photochemotherapy For Severe Photoresponsive Dermatoses: Petrolatum And Broad Band Uvb: The patient received Photochemotherapyfor severe photoresponsive dermatoses: Petrolatum and Broad Band UVB requiring at least 4 to 8 hours of care under direct physician supervision.
Protocol For Photochemotherapy For Severe Photoresponsive Dermatoses: Petrolatum And Nbuvb: The patient received Photochemotherapy for severe photoresponsive dermatoses: Petrolatum and NBUVB requiring at least 4 to 8 hours of care under direct physician supervision.
Consent: Written consent obtained.  The risks were reviewed with the patient including but not limited to: burn, pigmentary changes, pain, blistering, scabbing, redness, increased risk of skin cancers, and the remote possibility of scarring.
Name Of Supervising Technician: Rita Raygoza MA
Protocol For Puva: The patient received PUVA.
Treatment Number: 5
Protocol For Nb Uva: The patient received NB UVA.
Render Post-Care In The Note: no
Protocol For Photochemotherapy: Mineral Oil And Nbuvb: The patient received Photochemotherapy: Mineral Oil and NBUVB (mineral oil applied to all lesions prior to phototherapy).
Protocol For Photochemotherapy: Petrolatum And Broad Band Uvb: The patient received Photochemotherapy: Petrolatum and Broad Band UVB.

## 2023-10-23 ENCOUNTER — APPOINTMENT (RX ONLY)
Dept: URBAN - METROPOLITAN AREA CLINIC 4 | Facility: CLINIC | Age: 88
Setting detail: DERMATOLOGY
End: 2023-10-23

## 2023-10-23 DIAGNOSIS — L29.89 OTHER PRURITUS: ICD-10-CM

## 2023-10-23 DIAGNOSIS — L29.8 OTHER PRURITUS: ICD-10-CM

## 2023-10-23 PROCEDURE — ? PHOTOTHERAPY TREATMENT

## 2023-10-23 PROCEDURE — 96900 ACTINOTHERAPY UV LIGHT: CPT

## 2023-10-23 NOTE — PROCEDURE: PHOTOTHERAPY TREATMENT
Protocol For Uva: The patient received UVA.
Protocol For Nbuvb: Hands/Feet: The patient received NBUVB.
Skin Type: I
Protocol For Photochemotherapy For Severe Photoresponsive Dermatoses: Puva: The patient received Photochemotherapy for severe photoresponsive dermatoses: PUVA requiring at least 4 to 8 hours of care under direct physician supervision.
Protocol For Photochemotherapy: Mineral Oil And Broad Band Uvb: The patient received Photochemotherapy: Mineral Oil and Broad Band UVB.
Protocol For Bath Puva: The patient received Bath PUVA.
Protocol For Photochemotherapy: Tar And Nbuvb (Goeckerman Treatment): The patient received Photochemotherapy: Tar and NBUVB (Goeckerman treatment).
Comments On Previous Treatment: Patient denies any redness or discomfort from previous treatment.
Protocol For Photochemotherapy: Triamcinolone Ointment And Nbuvb: The patient received Photochemotherapy: Triamcinolone and NBUVB (triamcinolone ointment applied to all lesions prior to phototherapy).
Protocol For Uva1: The patient received UVA1.
Protocol: NBUVB
Protocol For Protocol For Photochemotherapy For Severe Photoresponsive Dermatoses: Bath Puva: The patient received Photochemotherapy for severe photoresponsive dermatoses: Bath PUVA requiring at least 4 to 8 hours of care under direct physician supervision.
Protocol For Photochemotherapy For Severe Photoresponsive Dermatoses: Tar And Nbuvb (Goeckerman Treatment): The patient received Photochemotherapy for severe photoresponsive dermatoses: Tar and NBUVB (Goeckerman treatment) requiring at least 4 to 8 hours of care under direct physician supervision.
Protocol For Broad Band Uvb: The patient received Broad Band UVB.
Post-Care Instructions: I reviewed with the patient in detail post-care instructions. Patient is to wear sun protection. Patients may expect sunburn like redness, discomfort and scabbing.
Protocol For Photochemotherapy For Severe Photoresponsive Dermatoses: Tar And Broad Band Uvb (Goeckerman Treatment): The patient received Photochemotherapy for severe photoresponsive dermatoses: Tar and Broad Band UVB (Goeckerman treatment) requiring at least 4 to 8 hours of care under direct physician supervision.
Detail Level: Generalized
Total Treatment Time: 1 minute 30 seconds
Changes In Treatment Protocol: Per protocol, time increased 15 seconds.
Protocol For Photochemotherapy: Petrolatum And Nbuvb: The patient received Photochemotherapy: Petrolatum and NBUVB (petrolatum applied to all lesions prior to phototherapy).
Protocol For Photochemotherapy: Tar And Broad Band Uvb (Goeckerman Treatment): The patient received Photochemotherapy: Tar and Broad Band UVB (Goeckerman treatment).
Protocol For Photochemotherapy: Baby Oil And Nbuvb: The patient received Photochemotherapy: Baby Oil and NBUVB (baby oil applied to all lesions prior to phototherapy).
Protocol For Photochemotherapy For Severe Photoresponsive Dermatoses: Petrolatum And Broad Band Uvb: The patient received Photochemotherapyfor severe photoresponsive dermatoses: Petrolatum and Broad Band UVB requiring at least 4 to 8 hours of care under direct physician supervision.
Protocol For Photochemotherapy For Severe Photoresponsive Dermatoses: Petrolatum And Nbuvb: The patient received Photochemotherapy for severe photoresponsive dermatoses: Petrolatum and NBUVB requiring at least 4 to 8 hours of care under direct physician supervision.
Consent: Written consent obtained.  The risks were reviewed with the patient including but not limited to: burn, pigmentary changes, pain, blistering, scabbing, redness, increased risk of skin cancers, and the remote possibility of scarring.
Name Of Supervising Technician: Rita Raygoza MA
Protocol For Puva: The patient received PUVA.
Treatment Number: 6
Protocol For Nb Uva: The patient received NB UVA.
Render Post-Care In The Note: no
Protocol For Photochemotherapy: Mineral Oil And Nbuvb: The patient received Photochemotherapy: Mineral Oil and NBUVB (mineral oil applied to all lesions prior to phototherapy).
Protocol For Photochemotherapy: Petrolatum And Broad Band Uvb: The patient received Photochemotherapy: Petrolatum and Broad Band UVB.

## 2023-10-25 ENCOUNTER — APPOINTMENT (RX ONLY)
Dept: URBAN - METROPOLITAN AREA CLINIC 15 | Facility: CLINIC | Age: 88
Setting detail: DERMATOLOGY
End: 2023-10-25

## 2023-10-25 DIAGNOSIS — R21 RASH AND OTHER NONSPECIFIC SKIN ERUPTION: ICD-10-CM | Status: INADEQUATELY CONTROLLED

## 2023-10-25 PROCEDURE — ? TREATMENT REGIMEN

## 2023-10-25 PROCEDURE — ? PRESCRIPTION

## 2023-10-25 PROCEDURE — 99214 OFFICE O/P EST MOD 30 MIN: CPT | Mod: 25

## 2023-10-25 PROCEDURE — ? ADDITIONAL NOTES

## 2023-10-25 PROCEDURE — 11104 PUNCH BX SKIN SINGLE LESION: CPT

## 2023-10-25 PROCEDURE — ? SEPARATE AND IDENTIFIABLE DOCUMENTATION

## 2023-10-25 PROCEDURE — ? ORDER TESTS

## 2023-10-25 PROCEDURE — ? BIOPSY BY PUNCH METHOD

## 2023-10-25 PROCEDURE — ? COUNSELING

## 2023-10-25 RX ORDER — CLOBETASOL PROPIONATE 0.5 MG/G
CREAM TOPICAL
Qty: 60 | Refills: 3 | Status: ERX | COMMUNITY
Start: 2023-10-25

## 2023-10-25 RX ADMIN — CLOBETASOL PROPIONATE: 0.5 CREAM TOPICAL at 00:00

## 2023-10-25 ASSESSMENT — LOCATION ZONE DERM
LOCATION ZONE: TRUNK
LOCATION ZONE: NECK

## 2023-10-25 ASSESSMENT — LOCATION SIMPLE DESCRIPTION DERM
LOCATION SIMPLE: RIGHT UPPER BACK
LOCATION SIMPLE: POSTERIOR NECK

## 2023-10-25 ASSESSMENT — LOCATION DETAILED DESCRIPTION DERM
LOCATION DETAILED: RIGHT INFERIOR MEDIAL UPPER BACK
LOCATION DETAILED: RIGHT LATERAL TRAPEZIAL NECK

## 2023-10-25 NOTE — PROCEDURE: BIOPSY BY PUNCH METHOD
Detail Level: Detailed
Was A Bandage Applied: Yes
Punch Size In Mm: 4
Size Of Lesion In Cm (Optional): 0
Depth Of Punch Biopsy: dermis
Biopsy Type: H and E
Anesthesia Type: 1% lidocaine with epinephrine
Anesthesia Volume In Cc: 0.5
Hemostasis: None
Epidermal Sutures: 4-0 Prolene
Wound Care: Petrolatum
Dressing: bandage
Suture Removal: 10 days
Patient Will Remove Sutures At Home?: No
Lab: [id number]
Lab Facility: 
Consent: Written consent was obtained and risks were reviewed including but not limited to scarring, infection, bleeding, scabbing, incomplete removal, nerve damage and allergy to anesthesia.
Post-Care Instructions: Reviewed with patient the post-care instructions. Patient is to keep the biopsy site covered until tomorrow at which time they will remove the bandage, wash with gentle soap & water, then apply liberal amount of petroleum jelly and again cover with bandage. They will do this daily until sutures removed. Recommend sun protection of biopsy site.
Home Suture Removal Text: Patient was provided a home suture removal kit and will remove their sutures at home.  If they have any questions or difficulties, they will call the office.
Notification Instructions: Patient will be notified of biopsy results. However, patient instructed to call the office if not contacted within 2 weeks.
Billing Type: Third-Party Bill
Information: Selecting Yes will display possible errors in your note based on the variables you have selected. This validation is only offered as a suggestion for you. PLEASE NOTE THAT THE VALIDATION TEXT WILL BE REMOVED WHEN YOU FINALIZE YOUR NOTE. IF YOU WANT TO FAX A PRELIMINARY NOTE YOU WILL NEED TO TOGGLE THIS TO 'NO' IF YOU DO NOT WANT IT IN YOUR FAXED NOTE.

## 2023-10-25 NOTE — PROCEDURE: TREATMENT REGIMEN
Continue Regimen: triamcinolone acetonide 0.1 % topical cream BID\\nSig: Apply twice daily to itchy skin up to 2 weeks/month as needed. Avoid face, groin, and body folds.
Detail Level: Detailed
Plan: Will biopsy today, and order labs. Patient will start clobetasol in replacement for triamcinalone. We discussed possibly using prednisone if not improving.
Initiate Treatment: Clobetasol cream

## 2023-10-25 NOTE — PROCEDURE: ORDER TESTS
Billing Type: Third-Party Bill
Expected Date Of Service: 10/25/2023
Bill For Surgical Tray: no
Performing Laboratory: 0

## 2023-10-25 NOTE — PROCEDURE: ADDITIONAL NOTES
Additional Notes: Patient was seen at [facility] about 6 weeks ago, He’s recently seen Corwin Mas- she referred to me, itching comes and goes and appears in different areas but mostly present on back, he has been doing light therapy and using triamcinalone. Recently diagnosed with gout. Brain scan earlier this year, plaque was present and started b-12. He discontinued the b-12. Discontinued most OTC supplements but did not see any improvement with stopping those. No h/o cancer other than skin cancer. Currently using dove soap and cerve moisturizing cream.
Detail Level: Simple
Render Risk Assessment In Note?: no

## 2023-10-26 ENCOUNTER — APPOINTMENT (RX ONLY)
Dept: URBAN - METROPOLITAN AREA CLINIC 4 | Facility: CLINIC | Age: 88
Setting detail: DERMATOLOGY
End: 2023-10-26

## 2023-10-26 DIAGNOSIS — L29.89 OTHER PRURITUS: ICD-10-CM

## 2023-10-26 DIAGNOSIS — L29.8 OTHER PRURITUS: ICD-10-CM

## 2023-10-26 PROCEDURE — ? PHOTOTHERAPY TREATMENT

## 2023-10-26 PROCEDURE — 96900 ACTINOTHERAPY UV LIGHT: CPT

## 2023-10-26 NOTE — PROCEDURE: PHOTOTHERAPY TREATMENT
Protocol For Uva: The patient received UVA.
Protocol For Nbuvb: Hands/Feet: The patient received NBUVB.
Skin Type: I
Protocol For Photochemotherapy For Severe Photoresponsive Dermatoses: Puva: The patient received Photochemotherapy for severe photoresponsive dermatoses: PUVA requiring at least 4 to 8 hours of care under direct physician supervision.
Protocol For Photochemotherapy: Mineral Oil And Broad Band Uvb: The patient received Photochemotherapy: Mineral Oil and Broad Band UVB.
Protocol For Bath Puva: The patient received Bath PUVA.
Protocol For Photochemotherapy: Tar And Nbuvb (Goeckerman Treatment): The patient received Photochemotherapy: Tar and NBUVB (Goeckerman treatment).
Comments On Previous Treatment: Patient denies any redness or discomfort from previous treatment.
Protocol For Photochemotherapy: Triamcinolone Ointment And Nbuvb: The patient received Photochemotherapy: Triamcinolone and NBUVB (triamcinolone ointment applied to all lesions prior to phototherapy).
Protocol For Uva1: The patient received UVA1.
Protocol: NBUVB
Protocol For Protocol For Photochemotherapy For Severe Photoresponsive Dermatoses: Bath Puva: The patient received Photochemotherapy for severe photoresponsive dermatoses: Bath PUVA requiring at least 4 to 8 hours of care under direct physician supervision.
Protocol For Photochemotherapy For Severe Photoresponsive Dermatoses: Tar And Nbuvb (Goeckerman Treatment): The patient received Photochemotherapy for severe photoresponsive dermatoses: Tar and NBUVB (Goeckerman treatment) requiring at least 4 to 8 hours of care under direct physician supervision.
Protocol For Broad Band Uvb: The patient received Broad Band UVB.
Post-Care Instructions: I reviewed with the patient in detail post-care instructions. Patient is to wear sun protection. Patients may expect sunburn like redness, discomfort and scabbing.
Protocol For Photochemotherapy For Severe Photoresponsive Dermatoses: Tar And Broad Band Uvb (Goeckerman Treatment): The patient received Photochemotherapy for severe photoresponsive dermatoses: Tar and Broad Band UVB (Goeckerman treatment) requiring at least 4 to 8 hours of care under direct physician supervision.
Detail Level: Generalized
Total Treatment Time: 1 minute 45 seconds
Changes In Treatment Protocol: Per protocol, time increased 15 seconds.
Protocol For Photochemotherapy: Petrolatum And Nbuvb: The patient received Photochemotherapy: Petrolatum and NBUVB (petrolatum applied to all lesions prior to phototherapy).
Protocol For Photochemotherapy: Tar And Broad Band Uvb (Goeckerman Treatment): The patient received Photochemotherapy: Tar and Broad Band UVB (Goeckerman treatment).
Protocol For Photochemotherapy: Baby Oil And Nbuvb: The patient received Photochemotherapy: Baby Oil and NBUVB (baby oil applied to all lesions prior to phototherapy).
Protocol For Photochemotherapy For Severe Photoresponsive Dermatoses: Petrolatum And Broad Band Uvb: The patient received Photochemotherapyfor severe photoresponsive dermatoses: Petrolatum and Broad Band UVB requiring at least 4 to 8 hours of care under direct physician supervision.
Protocol For Photochemotherapy For Severe Photoresponsive Dermatoses: Petrolatum And Nbuvb: The patient received Photochemotherapy for severe photoresponsive dermatoses: Petrolatum and NBUVB requiring at least 4 to 8 hours of care under direct physician supervision.
Consent: Written consent obtained.  The risks were reviewed with the patient including but not limited to: burn, pigmentary changes, pain, blistering, scabbing, redness, increased risk of skin cancers, and the remote possibility of scarring.
Name Of Supervising Technician: Rita Raygoza MA
Protocol For Puva: The patient received PUVA.
Treatment Number: 7
Protocol For Nb Uva: The patient received NB UVA.
Render Post-Care In The Note: no
Protocol For Photochemotherapy: Mineral Oil And Nbuvb: The patient received Photochemotherapy: Mineral Oil and NBUVB (mineral oil applied to all lesions prior to phototherapy).
Protocol For Photochemotherapy: Petrolatum And Broad Band Uvb: The patient received Photochemotherapy: Petrolatum and Broad Band UVB.

## 2023-10-30 ENCOUNTER — APPOINTMENT (RX ONLY)
Dept: URBAN - METROPOLITAN AREA CLINIC 4 | Facility: CLINIC | Age: 88
Setting detail: DERMATOLOGY
End: 2023-10-30

## 2023-10-30 DIAGNOSIS — L29.8 OTHER PRURITUS: ICD-10-CM

## 2023-10-30 DIAGNOSIS — L29.89 OTHER PRURITUS: ICD-10-CM

## 2023-10-30 PROCEDURE — 96900 ACTINOTHERAPY UV LIGHT: CPT

## 2023-10-30 PROCEDURE — ? PHOTOTHERAPY TREATMENT

## 2023-10-30 NOTE — PROCEDURE: PHOTOTHERAPY TREATMENT
Protocol For Uva: The patient received UVA.
Protocol For Nbuvb: Hands/Feet: The patient received NBUVB.
Skin Type: I
Protocol For Photochemotherapy For Severe Photoresponsive Dermatoses: Puva: The patient received Photochemotherapy for severe photoresponsive dermatoses: PUVA requiring at least 4 to 8 hours of care under direct physician supervision.
Protocol For Photochemotherapy: Mineral Oil And Broad Band Uvb: The patient received Photochemotherapy: Mineral Oil and Broad Band UVB.
Protocol For Bath Puva: The patient received Bath PUVA.
Protocol For Photochemotherapy: Tar And Nbuvb (Goeckerman Treatment): The patient received Photochemotherapy: Tar and NBUVB (Goeckerman treatment).
Comments On Previous Treatment: Patient denies any redness or discomfort from previous treatment.
Protocol For Photochemotherapy: Triamcinolone Ointment And Nbuvb: The patient received Photochemotherapy: Triamcinolone and NBUVB (triamcinolone ointment applied to all lesions prior to phototherapy).
Protocol For Uva1: The patient received UVA1.
Protocol: NBUVB
Protocol For Protocol For Photochemotherapy For Severe Photoresponsive Dermatoses: Bath Puva: The patient received Photochemotherapy for severe photoresponsive dermatoses: Bath PUVA requiring at least 4 to 8 hours of care under direct physician supervision.
Protocol For Photochemotherapy For Severe Photoresponsive Dermatoses: Tar And Nbuvb (Goeckerman Treatment): The patient received Photochemotherapy for severe photoresponsive dermatoses: Tar and NBUVB (Goeckerman treatment) requiring at least 4 to 8 hours of care under direct physician supervision.
Protocol For Broad Band Uvb: The patient received Broad Band UVB.
Post-Care Instructions: I reviewed with the patient in detail post-care instructions. Patient is to wear sun protection. Patients may expect sunburn like redness, discomfort and scabbing.
Protocol For Photochemotherapy For Severe Photoresponsive Dermatoses: Tar And Broad Band Uvb (Goeckerman Treatment): The patient received Photochemotherapy for severe photoresponsive dermatoses: Tar and Broad Band UVB (Goeckerman treatment) requiring at least 4 to 8 hours of care under direct physician supervision.
Detail Level: Generalized
Total Treatment Time: 2 minute 0 seconds
Changes In Treatment Protocol: Per protocol, time increased 15 seconds.
Protocol For Photochemotherapy: Petrolatum And Nbuvb: The patient received Photochemotherapy: Petrolatum and NBUVB (petrolatum applied to all lesions prior to phototherapy).
Protocol For Photochemotherapy: Tar And Broad Band Uvb (Goeckerman Treatment): The patient received Photochemotherapy: Tar and Broad Band UVB (Goeckerman treatment).
Protocol For Photochemotherapy: Baby Oil And Nbuvb: The patient received Photochemotherapy: Baby Oil and NBUVB (baby oil applied to all lesions prior to phototherapy).
Protocol For Photochemotherapy For Severe Photoresponsive Dermatoses: Petrolatum And Broad Band Uvb: The patient received Photochemotherapyfor severe photoresponsive dermatoses: Petrolatum and Broad Band UVB requiring at least 4 to 8 hours of care under direct physician supervision.
Protocol For Photochemotherapy For Severe Photoresponsive Dermatoses: Petrolatum And Nbuvb: The patient received Photochemotherapy for severe photoresponsive dermatoses: Petrolatum and NBUVB requiring at least 4 to 8 hours of care under direct physician supervision.
Consent: Written consent obtained.  The risks were reviewed with the patient including but not limited to: burn, pigmentary changes, pain, blistering, scabbing, redness, increased risk of skin cancers, and the remote possibility of scarring.
Name Of Supervising Technician: Rita Raygoza MA
Protocol For Puva: The patient received PUVA.
Treatment Number: 8
Protocol For Nb Uva: The patient received NB UVA.
Render Post-Care In The Note: no
Protocol For Photochemotherapy: Mineral Oil And Nbuvb: The patient received Photochemotherapy: Mineral Oil and NBUVB (mineral oil applied to all lesions prior to phototherapy).
Protocol For Photochemotherapy: Petrolatum And Broad Band Uvb: The patient received Photochemotherapy: Petrolatum and Broad Band UVB.

## 2023-11-01 ENCOUNTER — APPOINTMENT (RX ONLY)
Dept: URBAN - METROPOLITAN AREA CLINIC 4 | Facility: CLINIC | Age: 88
Setting detail: DERMATOLOGY
End: 2023-11-01

## 2023-11-01 DIAGNOSIS — L29.8 OTHER PRURITUS: ICD-10-CM

## 2023-11-01 DIAGNOSIS — L29.89 OTHER PRURITUS: ICD-10-CM

## 2023-11-01 PROCEDURE — ? PHOTOTHERAPY TREATMENT

## 2023-11-01 PROCEDURE — 96900 ACTINOTHERAPY UV LIGHT: CPT

## 2023-11-01 NOTE — PROCEDURE: PHOTOTHERAPY TREATMENT
Protocol For Uva: The patient received UVA.
Protocol For Nbuvb: Hands/Feet: The patient received NBUVB.
Skin Type: I
Protocol For Photochemotherapy For Severe Photoresponsive Dermatoses: Puva: The patient received Photochemotherapy for severe photoresponsive dermatoses: PUVA requiring at least 4 to 8 hours of care under direct physician supervision.
Protocol For Photochemotherapy: Mineral Oil And Broad Band Uvb: The patient received Photochemotherapy: Mineral Oil and Broad Band UVB.
Protocol For Bath Puva: The patient received Bath PUVA.
Protocol For Photochemotherapy: Tar And Nbuvb (Goeckerman Treatment): The patient received Photochemotherapy: Tar and NBUVB (Goeckerman treatment).
Comments On Previous Treatment: Patient denies any redness or discomfort from previous treatment.
Protocol For Photochemotherapy: Triamcinolone Ointment And Nbuvb: The patient received Photochemotherapy: Triamcinolone and NBUVB (triamcinolone ointment applied to all lesions prior to phototherapy).
Protocol For Uva1: The patient received UVA1.
Protocol: NBUVB
Protocol For Protocol For Photochemotherapy For Severe Photoresponsive Dermatoses: Bath Puva: The patient received Photochemotherapy for severe photoresponsive dermatoses: Bath PUVA requiring at least 4 to 8 hours of care under direct physician supervision.
Protocol For Photochemotherapy For Severe Photoresponsive Dermatoses: Tar And Nbuvb (Goeckerman Treatment): The patient received Photochemotherapy for severe photoresponsive dermatoses: Tar and NBUVB (Goeckerman treatment) requiring at least 4 to 8 hours of care under direct physician supervision.
Protocol For Broad Band Uvb: The patient received Broad Band UVB.
Post-Care Instructions: I reviewed with the patient in detail post-care instructions. Patient is to wear sun protection. Patients may expect sunburn like redness, discomfort and scabbing.
Protocol For Photochemotherapy For Severe Photoresponsive Dermatoses: Tar And Broad Band Uvb (Goeckerman Treatment): The patient received Photochemotherapy for severe photoresponsive dermatoses: Tar and Broad Band UVB (Goeckerman treatment) requiring at least 4 to 8 hours of care under direct physician supervision.
Detail Level: Generalized
Total Treatment Time: 2 minute 0 seconds
Changes In Treatment Protocol: Per pt request, time held
Protocol For Photochemotherapy: Petrolatum And Nbuvb: The patient received Photochemotherapy: Petrolatum and NBUVB (petrolatum applied to all lesions prior to phototherapy).
Protocol For Photochemotherapy: Tar And Broad Band Uvb (Goeckerman Treatment): The patient received Photochemotherapy: Tar and Broad Band UVB (Goeckerman treatment).
Protocol For Photochemotherapy: Baby Oil And Nbuvb: The patient received Photochemotherapy: Baby Oil and NBUVB (baby oil applied to all lesions prior to phototherapy).
Protocol For Photochemotherapy For Severe Photoresponsive Dermatoses: Petrolatum And Broad Band Uvb: The patient received Photochemotherapyfor severe photoresponsive dermatoses: Petrolatum and Broad Band UVB requiring at least 4 to 8 hours of care under direct physician supervision.
Protocol For Photochemotherapy For Severe Photoresponsive Dermatoses: Petrolatum And Nbuvb: The patient received Photochemotherapy for severe photoresponsive dermatoses: Petrolatum and NBUVB requiring at least 4 to 8 hours of care under direct physician supervision.
Consent: Written consent obtained.  The risks were reviewed with the patient including but not limited to: burn, pigmentary changes, pain, blistering, scabbing, redness, increased risk of skin cancers, and the remote possibility of scarring.
Name Of Supervising Technician: Rita Raygoza MA
Protocol For Puva: The patient received PUVA.
Treatment Number: 9
Protocol For Nb Uva: The patient received NB UVA.
Render Post-Care In The Note: no
Protocol For Photochemotherapy: Mineral Oil And Nbuvb: The patient received Photochemotherapy: Mineral Oil and NBUVB (mineral oil applied to all lesions prior to phototherapy).
Protocol For Photochemotherapy: Petrolatum And Broad Band Uvb: The patient received Photochemotherapy: Petrolatum and Broad Band UVB.

## 2023-11-03 ENCOUNTER — APPOINTMENT (RX ONLY)
Dept: URBAN - METROPOLITAN AREA CLINIC 4 | Facility: CLINIC | Age: 88
Setting detail: DERMATOLOGY
End: 2023-11-03

## 2023-11-03 DIAGNOSIS — L29.8 OTHER PRURITUS: ICD-10-CM

## 2023-11-03 DIAGNOSIS — L29.89 OTHER PRURITUS: ICD-10-CM

## 2023-11-03 PROCEDURE — ? PHOTOTHERAPY TREATMENT

## 2023-11-03 PROCEDURE — 96900 ACTINOTHERAPY UV LIGHT: CPT

## 2023-11-03 NOTE — PROCEDURE: PHOTOTHERAPY TREATMENT
Protocol For Uva: The patient received UVA.
Protocol For Nbuvb: Hands/Feet: The patient received NBUVB.
Skin Type: I
Protocol For Photochemotherapy For Severe Photoresponsive Dermatoses: Puva: The patient received Photochemotherapy for severe photoresponsive dermatoses: PUVA requiring at least 4 to 8 hours of care under direct physician supervision.
Protocol For Photochemotherapy: Mineral Oil And Broad Band Uvb: The patient received Photochemotherapy: Mineral Oil and Broad Band UVB.
Protocol For Bath Puva: The patient received Bath PUVA.
Protocol For Photochemotherapy: Tar And Nbuvb (Goeckerman Treatment): The patient received Photochemotherapy: Tar and NBUVB (Goeckerman treatment).
Comments On Previous Treatment: Patient denies any redness or discomfort from previous treatment.
Protocol For Photochemotherapy: Triamcinolone Ointment And Nbuvb: The patient received Photochemotherapy: Triamcinolone and NBUVB (triamcinolone ointment applied to all lesions prior to phototherapy).
Protocol For Uva1: The patient received UVA1.
Protocol: NBUVB
Protocol For Protocol For Photochemotherapy For Severe Photoresponsive Dermatoses: Bath Puva: The patient received Photochemotherapy for severe photoresponsive dermatoses: Bath PUVA requiring at least 4 to 8 hours of care under direct physician supervision.
Protocol For Photochemotherapy For Severe Photoresponsive Dermatoses: Tar And Nbuvb (Goeckerman Treatment): The patient received Photochemotherapy for severe photoresponsive dermatoses: Tar and NBUVB (Goeckerman treatment) requiring at least 4 to 8 hours of care under direct physician supervision.
Protocol For Broad Band Uvb: The patient received Broad Band UVB.
Post-Care Instructions: I reviewed with the patient in detail post-care instructions. Patient is to wear sun protection. Patients may expect sunburn like redness, discomfort and scabbing.
Protocol For Photochemotherapy For Severe Photoresponsive Dermatoses: Tar And Broad Band Uvb (Goeckerman Treatment): The patient received Photochemotherapy for severe photoresponsive dermatoses: Tar and Broad Band UVB (Goeckerman treatment) requiring at least 4 to 8 hours of care under direct physician supervision.
Detail Level: Generalized
Total Treatment Time: 2 minute 0 seconds
Changes In Treatment Protocol: Per pt request, time held
Protocol For Photochemotherapy: Petrolatum And Nbuvb: The patient received Photochemotherapy: Petrolatum and NBUVB (petrolatum applied to all lesions prior to phototherapy).
Protocol For Photochemotherapy: Tar And Broad Band Uvb (Goeckerman Treatment): The patient received Photochemotherapy: Tar and Broad Band UVB (Goeckerman treatment).
Protocol For Photochemotherapy: Baby Oil And Nbuvb: The patient received Photochemotherapy: Baby Oil and NBUVB (baby oil applied to all lesions prior to phototherapy).
Protocol For Photochemotherapy For Severe Photoresponsive Dermatoses: Petrolatum And Broad Band Uvb: The patient received Photochemotherapyfor severe photoresponsive dermatoses: Petrolatum and Broad Band UVB requiring at least 4 to 8 hours of care under direct physician supervision.
Protocol For Photochemotherapy For Severe Photoresponsive Dermatoses: Petrolatum And Nbuvb: The patient received Photochemotherapy for severe photoresponsive dermatoses: Petrolatum and NBUVB requiring at least 4 to 8 hours of care under direct physician supervision.
Consent: Written consent obtained.  The risks were reviewed with the patient including but not limited to: burn, pigmentary changes, pain, blistering, scabbing, redness, increased risk of skin cancers, and the remote possibility of scarring.
Name Of Supervising Technician: Rita Raygoza MA
Protocol For Puva: The patient received PUVA.
Treatment Number: 10
Protocol For Nb Uva: The patient received NB UVA.
Render Post-Care In The Note: no
Protocol For Photochemotherapy: Mineral Oil And Nbuvb: The patient received Photochemotherapy: Mineral Oil and NBUVB (mineral oil applied to all lesions prior to phototherapy).
Protocol For Photochemotherapy: Petrolatum And Broad Band Uvb: The patient received Photochemotherapy: Petrolatum and Broad Band UVB.

## 2023-11-06 ENCOUNTER — HOSPITAL ENCOUNTER (OUTPATIENT)
Facility: MEDICAL CENTER | Age: 88
End: 2023-11-06
Attending: DERMATOLOGY
Payer: MEDICARE

## 2023-11-06 ENCOUNTER — APPOINTMENT (RX ONLY)
Dept: URBAN - METROPOLITAN AREA CLINIC 15 | Facility: CLINIC | Age: 88
Setting detail: DERMATOLOGY
End: 2023-11-06

## 2023-11-06 DIAGNOSIS — Z48.02 ENCOUNTER FOR REMOVAL OF SUTURES: ICD-10-CM

## 2023-11-06 DIAGNOSIS — R21 RASH AND OTHER NONSPECIFIC SKIN ERUPTION: ICD-10-CM | Status: INADEQUATELY CONTROLLED

## 2023-11-06 PROCEDURE — 87102 FUNGUS ISOLATION CULTURE: CPT

## 2023-11-06 PROCEDURE — 87070 CULTURE OTHR SPECIMN AEROBIC: CPT

## 2023-11-06 PROCEDURE — 87205 SMEAR GRAM STAIN: CPT | Mod: 91

## 2023-11-06 PROCEDURE — ? ADDITIONAL NOTES

## 2023-11-06 PROCEDURE — ? SUTURE REMOVAL (GLOBAL PERIOD)

## 2023-11-06 PROCEDURE — ? COUNSELING

## 2023-11-06 PROCEDURE — ? TREATMENT REGIMEN

## 2023-11-06 PROCEDURE — ? PRESCRIPTION

## 2023-11-06 PROCEDURE — ? MEDICATION COUNSELING

## 2023-11-06 PROCEDURE — ? ORDER TESTS

## 2023-11-06 PROCEDURE — 99214 OFFICE O/P EST MOD 30 MIN: CPT

## 2023-11-06 RX ORDER — DOXYCYCLINE 100 MG/1
1 TABLET, FILM COATED ORAL BID
Qty: 60 | Refills: 0 | Status: ERX

## 2023-11-06 ASSESSMENT — LOCATION ZONE DERM
LOCATION ZONE: TRUNK
LOCATION ZONE: NECK

## 2023-11-06 ASSESSMENT — LOCATION SIMPLE DESCRIPTION DERM
LOCATION SIMPLE: POSTERIOR NECK
LOCATION SIMPLE: RIGHT UPPER BACK

## 2023-11-06 ASSESSMENT — LOCATION DETAILED DESCRIPTION DERM
LOCATION DETAILED: RIGHT LATERAL TRAPEZIAL NECK
LOCATION DETAILED: RIGHT INFERIOR MEDIAL UPPER BACK

## 2023-11-06 NOTE — PROCEDURE: ORDER TESTS
Performing Laboratory: [id number]
Billing Type: Third-Party Bill
Expected Date Of Service: 11/06/2023
Bill For Surgical Tray: no

## 2023-11-06 NOTE — PROCEDURE: MEDICATION COUNSELING
Use Enhanced Medication Counseling?: No
Sarecycline Counseling: Patient advised regarding possible photosensitivity and discoloration of the teeth, skin, lips, tongue and gums.  Patient instructed to avoid sunlight, if possible.  When exposed to sunlight, patients should wear protective clothing, sunglasses, and sunscreen.  The patient was instructed to call the office immediately if the following severe adverse effects occur:  hearing changes, easy bruising/bleeding, severe headache, or vision changes.  The patient verbalized understanding of the proper use and possible adverse effects of sarecycline.  All of the patient's questions and concerns were addressed.
Klisyri Counseling:  I discussed with the patient the risks of Klisyri including but not limited to erythema, scaling, itching, weeping, crusting, and pain.
Itraconazole Counseling:  I discussed with the patient the risks of itraconazole including but not limited to liver damage, nausea/vomiting, neuropathy, and severe allergy.  The patient understands that this medication is best absorbed when taken with acidic beverages such as non-diet cola or ginger ale.  The patient understands that monitoring is required including baseline LFTs and repeat LFTs at intervals.  The patient understands that they are to contact us or the primary physician if concerning signs are noted.
Adbry Counseling: I discussed with the patient the risks of tralokinumab including but not limited to eye infection and irritation, cold sores, injection site reactions, worsening of asthma, allergic reactions and increased risk of parasitic infection.  Live vaccines should be avoided while taking tralokinumab. The patient understands that monitoring is required and they must alert us or the primary physician if symptoms of infection or other concerning signs are noted.
Erivedge Counseling- I discussed with the patient the risks of Erivedge including but not limited to nausea, vomiting, diarrhea, constipation, weight loss, changes in the sense of taste, decreased appetite, muscle spasms, and hair loss.  The patient verbalized understanding of the proper use and possible adverse effects of Erivedge.  All of the patient's questions and concerns were addressed.
Wartpeel Pregnancy And Lactation Text: This medication is Pregnancy Category X and contraindicated in pregnancy and in women who may become pregnant. It is unknown if this medication is excreted in breast milk.
Clofazimine Pregnancy And Lactation Text: This medication is Pregnancy Category C and isn't considered safe during pregnancy. It is excreted in breast milk.
Prednisone Pregnancy And Lactation Text: This medication is Pregnancy Category C and it isn't know if it is safe during pregnancy. This medication is excreted in breast milk.
Infliximab Pregnancy And Lactation Text: This medication is Pregnancy Category B and is considered safe during pregnancy. It is unknown if this medication is excreted in breast milk.
Drysol Pregnancy And Lactation Text: This medication is considered safe during pregnancy and breast feeding.
Finasteride Male Counseling: Finasteride Counseling:  I discussed with the patient the risks of use of finasteride including but not limited to decreased libido, decreased ejaculate volume, gynecomastia, and depression. Women should not handle medication.  All of the patient's questions and concerns were addressed.
Erythromycin Pregnancy And Lactation Text: This medication is Pregnancy Category B and is considered safe during pregnancy. It is also excreted in breast milk.
Propranolol Pregnancy And Lactation Text: This medication is Pregnancy Category C and it isn't known if it is safe during pregnancy. It is excreted in breast milk.
Doxepin Counseling:  Patient advised that the medication is sedating and not to drive a car after taking this medication. Patient informed of potential adverse effects including but not limited to dry mouth, urinary retention, and blurry vision.  The patient verbalized understanding of the proper use and possible adverse effects of doxepin.  All of the patient's questions and concerns were addressed.
Rinvoq Pregnancy And Lactation Text: Based on animal studies, Rinvoq may cause embryo-fetal harm when administered to pregnant women.  The medication should not be used in pregnancy.  Breastfeeding is not recommended during treatment and for 6 days after the last dose.
Cellcept Counseling:  I discussed with the patient the risks of mycophenolate mofetil including but not limited to infection/immunosuppression, GI upset, hypokalemia, hypercholesterolemia, bone marrow suppression, lymphoproliferative disorders, malignancy, GI ulceration/bleed/perforation, colitis, interstitial lung disease, kidney failure, progressive multifocal leukoencephalopathy, and birth defects.  The patient understands that monitoring is required including a baseline creatinine and regular CBC testing. In addition, patient must alert us immediately if symptoms of infection or other concerning signs are noted.
Topical Ketoconazole Counseling: Patient counseled that this medication may cause skin irritation or allergic reactions.  In the event of skin irritation, the patient was advised to reduce the amount of the drug applied or use it less frequently.   The patient verbalized understanding of the proper use and possible adverse effects of ketoconazole.  All of the patient's questions and concerns were addressed.
Sotyktu Counseling:  I discussed the most common side effects of Sotyktu including: common cold, sore throat, sinus infections, cold sores, canker sores, folliculitis, and acne.  I also discussed more serious side effects of Sotyktu including but not limited to: serious allergic reactions; increased risk for infections such as TB; cancers such as lymphomas; rhabdomyolysis and elevated CPK; and elevated triglycerides and liver enzymes. 
Metronidazole Counseling:  I discussed with the patient the risks of metronidazole including but not limited to seizures, nausea/vomiting, a metallic taste in the mouth, nausea/vomiting and severe allergy.
Zyclara Counseling:  I discussed with the patient the risks of imiquimod including but not limited to erythema, scaling, itching, weeping, crusting, and pain.  Patient understands that the inflammatory response to imiquimod is variable from person to person and was educated regarded proper titration schedule.  If flu-like symptoms develop, patient knows to discontinue the medication and contact us.
Elidel Counseling: Patient may experience a mild burning sensation during topical application. Elidel is not approved in children less than 2 years of age. There have been case reports of hematologic and skin malignancies in patients using topical calcineurin inhibitors although causality is questionable.
Bactrim Pregnancy And Lactation Text: This medication is Pregnancy Category D and is known to cause fetal risk.  It is also excreted in breast milk.
Olanzapine Pregnancy And Lactation Text: This medication is pregnancy category C.   There are no adequate and well controlled trials with olanzapine in pregnant females.  Olanzapine should be used during pregnancy only if the potential benefit justifies the potential risk to the fetus.   In a study in lactating healthy women, olanzapine was excreted in breast milk.  It is recommended that women taking olanzapine should not breast feed.
Cellcept Pregnancy And Lactation Text: This medication is Pregnancy Category D and isn't considered safe during pregnancy. It is unknown if this medication is excreted in breast milk.
Soolantra Counseling: I discussed with the patients the risks of topial Soolantra. This is a medicine which decreases the number of mites and inflammation in the skin. You experience burning, stinging, eye irritation or allergic reactions.  Please call our office if you develop any problems from using this medication.
Hydroxychloroquine Counseling:  I discussed with the patient that a baseline ophthalmologic exam is needed at the start of therapy and every year thereafter while on therapy. A CBC may also be warranted for monitoring.  The side effects of this medication were discussed with the patient, including but not limited to agranulocytosis, aplastic anemia, seizures, rashes, retinopathy, and liver toxicity. Patient instructed to call the office should any adverse effect occur.  The patient verbalized understanding of the proper use and possible adverse effects of Plaquenil.  All the patient's questions and concerns were addressed.
Benzoyl Peroxide Pregnancy And Lactation Text: This medication is Pregnancy Category C. It is unknown if benzoyl peroxide is excreted in breast milk.
High Dose Vitamin A Counseling: Side effects reviewed, pt to contact office should one occur.
Picato Pregnancy And Lactation Text: This medication is Pregnancy Category C. It is unknown if this medication is excreted in breast milk.
Stelara Counseling:  I discussed with the patient the risks of ustekinumab including but not limited to immunosuppression, malignancy, posterior leukoencephalopathy syndrome, and serious infections.  The patient understands that monitoring is required including a PPD at baseline and must alert us or the primary physician if symptoms of infection or other concerning signs are noted.
Colchicine Counseling:  Patient counseled regarding adverse effects including but not limited to stomach upset (nausea, vomiting, stomach pain, or diarrhea).  Patient instructed to limit alcohol consumption while taking this medication.  Colchicine may reduce blood counts especially with prolonged use.  The patient understands that monitoring of kidney function and blood counts may be required, especially at baseline. The patient verbalized understanding of the proper use and possible adverse effects of colchicine.  All of the patient's questions and concerns were addressed.
Protopic Counseling: Patient may experience a mild burning sensation during topical application. Protopic is not approved in children less than 2 years of age. There have been case reports of hematologic and skin malignancies in patients using topical calcineurin inhibitors although causality is questionable.
Adbry Pregnancy And Lactation Text: It is unknown if this medication will adversely affect pregnancy or breast feeding.
SSKI Counseling:  I discussed with the patient the risks of SSKI including but not limited to thyroid abnormalities, metallic taste, GI upset, fever, headache, acne, arthralgias, paraesthesias, lymphadenopathy, easy bleeding, arrhythmias, and allergic reaction.
Rituxan Counseling:  I discussed with the patient the risks of Rituxan infusions. Side effects can include infusion reactions, severe drug rashes including mucocutaneous reactions, reactivation of latent hepatitis and other infections and rarely progressive multifocal leukoencephalopathy.  All of the patient's questions and concerns were addressed.
Erivedge Pregnancy And Lactation Text: This medication is Pregnancy Category X and is absolutely contraindicated during pregnancy. It is unknown if it is excreted in breast milk.
Klisyri Pregnancy And Lactation Text: It is unknown if this medication can harm a developing fetus or if it is excreted in breast milk.
Itraconazole Pregnancy And Lactation Text: This medication is Pregnancy Category C and it isn't know if it is safe during pregnancy. It is also excreted in breast milk.
Sarecycline Pregnancy And Lactation Text: This medication is Pregnancy Category D and not consider safe during pregnancy. It is also excreted in breast milk.
Doxepin Pregnancy And Lactation Text: This medication is Pregnancy Category C and it isn't known if it is safe during pregnancy. It is also excreted in breast milk and breast feeding isn't recommended.
Finasteride Pregnancy And Lactation Text: This medication is absolutely contraindicated during pregnancy. It is unknown if it is excreted in breast milk.
Topical Ketoconazole Pregnancy And Lactation Text: This medication is Pregnancy Category B and is considered safe during pregnancy. It is unknown if it is excreted in breast milk.
Topical Metronidazole Counseling: Metronidazole is a topical antibiotic medication. You may experience burning, stinging, redness, or allergic reactions.  Please call our office if you develop any problems from using this medication.
Hydroxyzine Counseling: Patient advised that the medication is sedating and not to drive a car after taking this medication.  Patient informed of potential adverse effects including but not limited to dry mouth, urinary retention, and blurry vision.  The patient verbalized understanding of the proper use and possible adverse effects of hydroxyzine.  All of the patient's questions and concerns were addressed.
Sotyktu Pregnancy And Lactation Text: There is insufficient data to evaluate whether or not Sotyktu is safe to use during pregnancy.   It is not known if Sotyktu passes into breast milk and whether or not it is safe to use when breastfeeding.  
Oral Minoxidil Counseling- I discussed with the patient the risks of oral minoxidil including but not limited to shortness of breath, swelling of the feet or ankles, dizziness, lightheadedness, unwanted hair growth and allergic reaction.  The patient verbalized understanding of the proper use and possible adverse effects of oral minoxidil.  All of the patient's questions and concerns were addressed.
Soolantra Pregnancy And Lactation Text: This medication is Pregnancy Category C. This medication is considered safe during breast feeding.
Cyclophosphamide Counseling:  I discussed with the patient the risks of cyclophosphamide including but not limited to hair loss, hormonal abnormalities, decreased fertility, abdominal pain, diarrhea, nausea and vomiting, bone marrow suppression and infection. The patient understands that monitoring is required while taking this medication.
Humira Counseling:  I discussed with the patient the risks of adalimumab including but not limited to myelosuppression, immunosuppression, autoimmune hepatitis, demyelinating diseases, lymphoma, and serious infections.  The patient understands that monitoring is required including a PPD at baseline and must alert us or the primary physician if symptoms of infection or other concerning signs are noted.
Hydroxychloroquine Pregnancy And Lactation Text: This medication has been shown to cause fetal harm but it isn't assigned a Pregnancy Risk Category. There are small amounts excreted in breast milk.
Carac Counseling:  I discussed with the patient the risks of Carac including but not limited to erythema, scaling, itching, weeping, crusting, and pain.
Cephalexin Counseling: I counseled the patient regarding use of cephalexin as an antibiotic for prophylactic and/or therapeutic purposes. Cephalexin (commonly prescribed under brand name Keflex) is a cephalosporin antibiotic which is active against numerous classes of bacteria, including most skin bacteria. Side effects may include nausea, diarrhea, gastrointestinal upset, rash, hives, yeast infections, and in rare cases, hepatitis, kidney disease, seizures, fever, confusion, neurologic symptoms, and others. Patients with severe allergies to penicillin medications are cautioned that there is about a 10% incidence of cross-reactivity with cephalosporins. When possible, patients with penicillin allergies should use alternatives to cephalosporins for antibiotic therapy.
Cibinqo Counseling: I discussed with the patient the risks of Cibinqo therapy including but not limited to common cold, nausea, headache, cold sores, increased blood CPK levels, dizziness, UTIs, fatigue, acne, and vomitting. Live vaccines should be avoided.  This medication has been linked to serious infections; higher rate of mortality; malignancy and lymphoproliferative disorders; major adverse cardiovascular events; thrombosis; thrombocytopenia and lymphopenia; lipid elevations; and retinal detachment.
Low Dose Naltrexone Counseling- I discussed with the patient the potential risks and side effects of low dose naltrexone including but not limited to: more vivid dreams, headaches, nausea, vomiting, abdominal pain, fatigue, dizziness, and anxiety.
Protopic Pregnancy And Lactation Text: This medication is Pregnancy Category C. It is unknown if this medication is excreted in breast milk when applied topically.
Taltz Counseling: I discussed with the patient the risks of ixekizumab including but not limited to immunosuppression, serious infections, worsening of inflammatory bowel disease and drug reactions.  The patient understands that monitoring is required including a PPD at baseline and must alert us or the primary physician if symptoms of infection or other concerning signs are noted.
Tetracycline Counseling: Patient counseled regarding possible photosensitivity and increased risk for sunburn.  Patient instructed to avoid sunlight, if possible.  When exposed to sunlight, patients should wear protective clothing, sunglasses, and sunscreen.  The patient was instructed to call the office immediately if the following severe adverse effects occur:  hearing changes, easy bruising/bleeding, severe headache, or vision changes.  The patient verbalized understanding of the proper use and possible adverse effects of tetracycline.  All of the patient's questions and concerns were addressed. Patient understands to avoid pregnancy while on therapy due to potential birth defects.
Cimzia Counseling:  I discussed with the patient the risks of Cimzia including but not limited to immunosuppression, allergic reactions and infections.  The patient understands that monitoring is required including a PPD at baseline and must alert us or the primary physician if symptoms of infection or other concerning signs are noted.
Rituxan Pregnancy And Lactation Text: This medication is Pregnancy Category C and it isn't know if it is safe during pregnancy. It is unknown if this medication is excreted in breast milk but similar antibodies are known to be excreted.
Libtayo Counseling- I discussed with the patient the risks of Libtayo including but not limited to nausea, vomiting, diarrhea, and bone or muscle pain.  The patient verbalized understanding of the proper use and possible adverse effects of Libtayo.  All of the patient's questions and concerns were addressed.
Metronidazole Pregnancy And Lactation Text: This medication is Pregnancy Category B and considered safe during pregnancy.  It is also excreted in breast milk.
Sski Pregnancy And Lactation Text: This medication is Pregnancy Category D and isn't considered safe during pregnancy. It is excreted in breast milk.
Minoxidil Counseling: Minoxidil is a topical medication which can increase blood flow where it is applied. It is uncertain how this medication increases hair growth. Side effects are uncommon and include stinging and allergic reactions.
Ketoconazole Counseling:   Patient counseled regarding improving absorption with orange juice.  Adverse effects include but are not limited to breast enlargement, headache, diarrhea, nausea, upset stomach, liver function test abnormalities, taste disturbance, and stomach pain.  There is a rare possibility of liver failure that can occur when taking ketoconazole. The patient understands that monitoring of LFTs may be required, especially at baseline. The patient verbalized understanding of the proper use and possible adverse effects of ketoconazole.  All of the patient's questions and concerns were addressed.
Birth Control Pills Counseling: Birth Control Pill Counseling: I discussed with the patient the potential side effects of OCPs including but not limited to increased risk of stroke, heart attack, thrombophlebitis, deep venous thrombosis, hepatic adenomas, breast changes, GI upset, headaches, and depression.  The patient verbalized understanding of the proper use and possible adverse effects of OCPs. All of the patient's questions and concerns were addressed.
Hydroxyzine Pregnancy And Lactation Text: This medication is not safe during pregnancy and should not be taken. It is also excreted in breast milk and breast feeding isn't recommended.
Birth Control Pills Pregnancy And Lactation Text: This medication should be avoided if pregnant and for the first 30 days post-partum.
Topical Metronidazole Pregnancy And Lactation Text: This medication is Pregnancy Category B and considered safe during pregnancy.  It is also considered safe to use while breastfeeding.
Eucrisa Counseling: Patient may experience a mild burning sensation during topical application. Eucrisa is not approved in children less than 3 months of age.
Xeljanz Counseling: I discussed with the patient the risks of Xeljanz therapy including increased risk of infection, liver issues, headache, diarrhea, or cold symptoms. Live vaccines should be avoided. They were instructed to call if they have any problems.
Minocycline Counseling: Patient advised regarding possible photosensitivity and discoloration of the teeth, skin, lips, tongue and gums.  Patient instructed to avoid sunlight, if possible.  When exposed to sunlight, patients should wear protective clothing, sunglasses, and sunscreen.  The patient was instructed to call the office immediately if the following severe adverse effects occur:  hearing changes, easy bruising/bleeding, severe headache, or vision changes.  The patient verbalized understanding of the proper use and possible adverse effects of minocycline.  All of the patient's questions and concerns were addressed.
Cyclophosphamide Pregnancy And Lactation Text: This medication is Pregnancy Category D and it isn't considered safe during pregnancy. This medication is excreted in breast milk.
Oral Minoxidil Pregnancy And Lactation Text: This medication should only be used when clearly needed if you are pregnant, attempting to become pregnant or breast feeding.
Topical Retinoid counseling:  Patient advised to apply a pea-sized amount only at bedtime and wait 30 minutes after washing their face before applying.  If too drying, patient may add a non-comedogenic moisturizer. The patient verbalized understanding of the proper use and possible adverse effects of retinoids.  All of the patient's questions and concerns were addressed.
Cibinqo Pregnancy And Lactation Text: It is unknown if this medication will adversely affect pregnancy or breast feeding.  You should not take this medication if you are currently pregnant or planning a pregnancy or while breastfeeding.
High Dose Vitamin A Pregnancy And Lactation Text: High dose vitamin A therapy is contraindicated during pregnancy and breast feeding.
Cephalexin Pregnancy And Lactation Text: This medication is Pregnancy Category B and considered safe during pregnancy.  It is also excreted in breast milk but can be used safely for shorter doses.
Low Dose Naltrexone Pregnancy And Lactation Text: Naltrexone is pregnancy category C.  There have been no adequate and well-controlled studies in pregnant women.  It should be used in pregnancy only if the potential benefit justifies the potential risk to the fetus.   Limited data indicates that naltrexone is minimally excreted into breastmilk.
Winlevi Counseling:  I discussed with the patient the risks of topical clascoterone including but not limited to erythema, scaling, itching, and stinging. Patient voiced their understanding.
Calcipotriene Counseling:  I discussed with the patient the risks of calcipotriene including but not limited to erythema, scaling, itching, and irritation.
Clindamycin Counseling: I counseled the patient regarding use of clindamycin as an antibiotic for prophylactic and/or therapeutic purposes. Clindamycin is active against numerous classes of bacteria, including skin bacteria. Side effects may include nausea, diarrhea, gastrointestinal upset, rash, hives, yeast infections, and in rare cases, colitis.
Qbrexza Counseling:  I discussed with the patient the risks of Qbrexza including but not limited to headache, mydriasis, blurred vision, dry eyes, nasal dryness, dry mouth, dry throat, dry skin, urinary hesitation, and constipation.  Local skin reactions including erythema, burning, stinging, and itching can also occur.
Litfulo Counseling: I discussed with the patient the risks of Litfulo therapy including but not limited to upper respiratory tract infections, shingles, cold sores, and nausea. Live vaccines should be avoided.  This medication has been linked to serious infections; higher rate of mortality; malignancy and lymphoproliferative disorders; major adverse cardiovascular events; thrombosis; gastrointestinal perforations; neutropenia; lymphopenia; anemia; liver enzyme elevations; and lipid elevations.
Dapsone Counseling: I discussed with the patient the risks of dapsone including but not limited to hemolytic anemia, agranulocytosis, rashes, methemoglobinemia, kidney failure, peripheral neuropathy, headaches, GI upset, and liver toxicity.  Patients who start dapsone require monitoring including baseline LFTs and weekly CBCs for the first month, then every month thereafter.  The patient verbalized understanding of the proper use and possible adverse effects of dapsone.  All of the patient's questions and concerns were addressed.
Cimzia Pregnancy And Lactation Text: This medication crosses the placenta but can be considered safe in certain situations. Cimzia may be excreted in breast milk.
Taltz Pregnancy And Lactation Text: The risk during pregnancy and breastfeeding is uncertain with this medication.
Opioid Counseling: I discussed with the patient the potential side effects of opioids including but not limited to addiction, altered mental status, and depression. I stressed avoiding alcohol, benzodiazepines, muscle relaxants and sleep aids unless specifically okayed by a physician. The patient verbalized understanding of the proper use and possible adverse effects of opioids. All of the patient's questions and concerns were addressed. They were instructed to flush the remaining pills down the toilet if they did not need them for pain.
Libtayo Pregnancy And Lactation Text: This medication is contraindicated in pregnancy and when breast feeding.
Siliq Counseling:  I discussed with the patient the risks of Siliq including but not limited to new or worsening depression, suicidal thoughts and behavior, immunosuppression, malignancy, posterior leukoencephalopathy syndrome, and serious infections.  The patient understands that monitoring is required including a PPD at baseline and must alert us or the primary physician if symptoms of infection or other concerning signs are noted. There is also a special program designed to monitor depression which is required with Siliq.
Thalidomide Counseling: I discussed with the patient the risks of thalidomide including but not limited to birth defects, anxiety, weakness, chest pain, dizziness, cough and severe allergy.
Minoxidil Pregnancy And Lactation Text: This medication has not been assigned a Pregnancy Risk Category but animal studies failed to show danger with the topical medication. It is unknown if the medication is excreted in breast milk.
Ketoconazole Pregnancy And Lactation Text: This medication is Pregnancy Category C and it isn't know if it is safe during pregnancy. It is also excreted in breast milk and breast feeding isn't recommended.
Mirvaso Counseling: Mirvaso is a topical medication which can decrease superficial blood flow where applied. Side effects are uncommon and include stinging, redness and allergic reactions.
Terbinafine Counseling: Patient counseling regarding adverse effects of terbinafine including but not limited to headache, diarrhea, rash, upset stomach, liver function test abnormalities, itching, taste/smell disturbance, nausea, abdominal pain, and flatulence.  There is a rare possibility of liver failure that can occur when taking terbinafine.  The patient understands that a baseline LFT and kidney function test may be required. The patient verbalized understanding of the proper use and possible adverse effects of terbinafine.  All of the patient's questions and concerns were addressed.
Topical Steroids Counseling: I discussed with the patient that prolonged use of topical steroids can result in the increased appearance of superficial blood vessels (telangiectasias), lightening (hypopigmentation) and thinning of the skin (atrophy).  Patient understands to avoid using high potency steroids in skin folds, the groin or the face.  The patient verbalized understanding of the proper use and possible adverse effects of topical steroids.  All of the patient's questions and concerns were addressed.
Calcipotriene Pregnancy And Lactation Text: The use of this medication during pregnancy or lactation is not recommended as there is insufficient data.
Spironolactone Counseling: Patient advised regarding risks of diarrhea, abdominal pain, hyperkalemia, birth defects (for female patients), liver toxicity and renal toxicity. The patient may need blood work to monitor liver and kidney function and potassium levels while on therapy. The patient verbalized understanding of the proper use and possible adverse effects of spironolactone.  All of the patient's questions and concerns were addressed.
Otezla Counseling: The side effects of Otezla were discussed with the patient, including but not limited to worsening or new depression, weight loss, diarrhea, nausea, upper respiratory tract infection, and headache. Patient instructed to call the office should any adverse effect occur.  The patient verbalized understanding of the proper use and possible adverse effects of Otezla.  All the patient's questions and concerns were addressed.
Xelarenz Pregnancy And Lactation Text: This medication is Pregnancy Category D and is not considered safe during pregnancy.  The risk during breast feeding is also uncertain.
Cyclosporine Counseling:  I discussed with the patient the risks of cyclosporine including but not limited to hypertension, gingival hyperplasia,myelosuppression, immunosuppression, liver damage, kidney damage, neurotoxicity, lymphoma, and serious infections. The patient understands that monitoring is required including baseline blood pressure, CBC, CMP, lipid panel and uric acid, and then 1-2 times monthly CMP and blood pressure.
Clindamycin Pregnancy And Lactation Text: This medication can be used in pregnancy if certain situations. Clindamycin is also present in breast milk.
Litfulo Pregnancy And Lactation Text: Based on animal studies, Lifulo may cause embryo-fetal harm when administered to pregnant women.  The medication should not be used in pregnancy.  Breastfeeding is not recommended during treatment.
Cantharidin Counseling:  I discussed with the patient the risks of Cantharidin including but not limited to pain, redness, burning, itching, and blistering.
Tremfya Counseling: I discussed with the patient the risks of guselkumab including but not limited to immunosuppression, serious infections, and drug reactions.  The patient understands that monitoring is required including a PPD at baseline and must alert us or the primary physician if symptoms of infection or other concerning signs are noted.
Niacinamide Counseling: I recommended taking niacin or niacinamide, also know as vitamin B3, twice daily. Recent evidence suggests that taking vitamin B3 (500 mg twice daily) can reduce the risk of actinic keratoses and non-melanoma skin cancers. Side effects of vitamin B3 include flushing and headache.
Albendazole Counseling:  I discussed with the patient the risks of albendazole including but not limited to cytopenia, kidney damage, nausea/vomiting and severe allergy.  The patient understands that this medication is being used in an off-label manner.
Cosentyx Counseling:  I discussed with the patient the risks of Cosentyx including but not limited to worsening of Crohn's disease, immunosuppression, allergic reactions and infections.  The patient understands that monitoring is required including a PPD at baseline and must alert us or the primary physician if symptoms of infection or other concerning signs are noted.
Qbrexza Pregnancy And Lactation Text: There is no available data on Qbrexza use in pregnant women.  There is no available data on Qbrexza use in lactation.
Opioid Pregnancy And Lactation Text: These medications can lead to premature delivery and should be avoided during pregnancy. These medications are also present in breast milk in small amounts.
Winlevi Pregnancy And Lactation Text: This medication is considered safe during pregnancy and breastfeeding.
Odomzo Counseling- I discussed with the patient the risks of Odomzo including but not limited to nausea, vomiting, diarrhea, constipation, weight loss, changes in the sense of taste, decreased appetite, muscle spasms, and hair loss.  The patient verbalized understanding of the proper use and possible adverse effects of Odomzo.  All of the patient's questions and concerns were addressed.
Aklief counseling:  Patient advised to apply a pea-sized amount only at bedtime and wait 30 minutes after washing their face before applying.  If too drying, patient may add a non-comedogenic moisturizer.  The most commonly reported side effects including irritation, redness, scaling, dryness, stinging, burning, itching, and increased risk of sunburn.  The patient verbalized understanding of the proper use and possible adverse effects of retinoids.  All of the patient's questions and concerns were addressed.
Dapsone Pregnancy And Lactation Text: This medication is Pregnancy Category C and is not considered safe during pregnancy or breast feeding.
Mirvaso Pregnancy And Lactation Text: This medication has not been assigned a Pregnancy Risk Category. It is unknown if the medication is excreted in breast milk.
Gabapentin Counseling: I discussed with the patient the risks of gabapentin including but not limited to dizziness, somnolence, fatigue and ataxia.
Terbinafine Pregnancy And Lactation Text: This medication is Pregnancy Category B and is considered safe during pregnancy. It is also excreted in breast milk and breast feeding isn't recommended.
Topical Steroids Applications Pregnancy And Lactation Text: Most topical steroids are considered safe to use during pregnancy and lactation.  Any topical steroid applied to the breast or nipple should be washed off before breastfeeding.
Simponi Counseling:  I discussed with the patient the risks of golimumab including but not limited to myelosuppression, immunosuppression, autoimmune hepatitis, demyelinating diseases, lymphoma, and serious infections.  The patient understands that monitoring is required including a PPD at baseline and must alert us or the primary physician if symptoms of infection or other concerning signs are noted.
Tranexamic Acid Counseling:  Patient advised of the small risk of bleeding problems with tranexamic acid. They were also instructed to call if they developed any nausea, vomiting or diarrhea. All of the patient's questions and concerns were addressed.
Cantharidin Pregnancy And Lactation Text: This medication has not been proven safe during pregnancy. It is unknown if this medication is excreted in breast milk.
Aklief Pregnancy And Lactation Text: It is unknown if this medication is safe to use during pregnancy.  It is unknown if this medication is excreted in breast milk.  Breastfeeding women should use the topical cream on the smallest area of the skin for the shortest time needed while breastfeeding.  Do not apply to nipple and areola.
Hydroquinone Counseling:  Patient advised that medication may result in skin irritation, lightening (hypopigmentation), dryness, and burning.  In the event of skin irritation, the patient was advised to reduce the amount of the drug applied or use it less frequently.  Rarely, spots that are treated with hydroquinone can become darker (pseudoochronosis).  Should this occur, patient instructed to stop medication and call the office. The patient verbalized understanding of the proper use and possible adverse effects of hydroquinone.  All of the patient's questions and concerns were addressed.
Spironolactone Pregnancy And Lactation Text: This medication can cause feminization of the male fetus and should be avoided during pregnancy. The active metabolite is also found in breast milk.
Fluconazole Counseling:  Patient counseled regarding adverse effects of fluconazole including but not limited to headache, diarrhea, nausea, upset stomach, liver function test abnormalities, taste disturbance, and stomach pain.  There is a rare possibility of liver failure that can occur when taking fluconazole.  The patient understands that monitoring of LFTs and kidney function test may be required, especially at baseline. The patient verbalized understanding of the proper use and possible adverse effects of fluconazole.  All of the patient's questions and concerns were addressed.
Quinolones Counseling:  I discussed with the patient the risks of fluoroquinolones including but not limited to GI upset, allergic reaction, drug rash, diarrhea, dizziness, photosensitivity, yeast infections, liver function test abnormalities, tendonitis/tendon rupture.
Tazorac Counseling:  Patient advised that medication is irritating and drying.  Patient may need to apply sparingly and wash off after an hour before eventually leaving it on overnight.  The patient verbalized understanding of the proper use and possible adverse effects of tazorac.  All of the patient's questions and concerns were addressed.
Otezla Pregnancy And Lactation Text: This medication is Pregnancy Category C and it isn't known if it is safe during pregnancy. It is unknown if it is excreted in breast milk.
Tazorac Pregnancy And Lactation Text: This medication is not safe during pregnancy. It is unknown if this medication is excreted in breast milk.
Methotrexate Counseling:  Patient counseled regarding adverse effects of methotrexate including but not limited to nausea, vomiting, abnormalities in liver function tests. Patients may develop mouth sores, rash, diarrhea, and abnormalities in blood counts. The patient understands that monitoring is required including LFT's and blood counts.  There is a rare possibility of scarring of the liver and lung problems that can occur when taking methotrexate. Persistent nausea, loss of appetite, pale stools, dark urine, cough, and shortness of breath should be reported immediately. Patient advised to discontinue methotrexate treatment at least three months before attempting to become pregnant.  I discussed the need for folate supplements while taking methotrexate.  These supplements can decrease side effects during methotrexate treatment. The patient verbalized understanding of the proper use and possible adverse effects of methotrexate.  All of the patient's questions and concerns were addressed.
Ilumya Counseling: I discussed with the patient the risks of tildrakizumab including but not limited to immunosuppression, malignancy, posterior leukoencephalopathy syndrome, and serious infections.  The patient understands that monitoring is required including a PPD at baseline and must alert us or the primary physician if symptoms of infection or other concerning signs are noted.
5-Fu Counseling: 5-Fluorouracil Counseling:  I discussed with the patient the risks of 5-fluorouracil including but not limited to erythema, scaling, itching, weeping, crusting, and pain.
Doxycycline Counseling:  Patient counseled regarding possible photosensitivity and increased risk for sunburn.  Patient instructed to avoid sunlight, if possible.  When exposed to sunlight, patients should wear protective clothing, sunglasses, and sunscreen.  The patient was instructed to call the office immediately if the following severe adverse effects occur:  hearing changes, easy bruising/bleeding, severe headache, or vision changes.  The patient verbalized understanding of the proper use and possible adverse effects of doxycycline.  All of the patient's questions and concerns were addressed.
Olumiant Counseling: I discussed with the patient the risks of Olumiant therapy including but not limited to upper respiratory tract infections, shingles, cold sores, and nausea. Live vaccines should be avoided.  This medication has been linked to serious infections; higher rate of mortality; malignancy and lymphoproliferative disorders; major adverse cardiovascular events; thrombosis; gastrointestinal perforations; neutropenia; lymphopenia; anemia; liver enzyme elevations; and lipid elevations.
Albendazole Pregnancy And Lactation Text: This medication is Pregnancy Category C and it isn't known if it is safe during pregnancy. It is also excreted in breast milk.
Rhofade Counseling: Rhofade is a topical medication which can decrease superficial blood flow where applied. Side effects are uncommon and include stinging, redness and allergic reactions.
VTAMA Counseling: I discussed with the patient that VTAMA is not for use in the eyes, mouth or mouth. They should call the office if they develop any signs of allergic reactions to VTAMA. The patient verbalized understanding of the proper use and possible adverse effects of VTAMA.  All of the patient's questions and concerns were addressed.
Niacinamide Pregnancy And Lactation Text: These medications are considered safe during pregnancy.
Acitretin Counseling:  I discussed with the patient the risks of acitretin including but not limited to hair loss, dry lips/skin/eyes, liver damage, hyperlipidemia, depression/suicidal ideation, photosensitivity.  Serious rare side effects can include but are not limited to pancreatitis, pseudotumor cerebri, bony changes, clot formation/stroke/heart attack.  Patient understands that alcohol is contraindicated since it can result in liver toxicity and significantly prolong the elimination of the drug by many years.
Azithromycin Counseling:  I discussed with the patient the risks of azithromycin including but not limited to GI upset, allergic reaction, drug rash, diarrhea, and yeast infections.
Bexarotene Pregnancy And Lactation Text: This medication is Pregnancy Category X and should not be given to women who are pregnant or may become pregnant. This medication should not be used if you are breast feeding.
Azelaic Acid Counseling: Patient counseled that medicine may cause skin irritation and to avoid applying near the eyes.  In the event of skin irritation, the patient was advised to reduce the amount of the drug applied or use it less frequently.   The patient verbalized understanding of the proper use and possible adverse effects of azelaic acid.  All of the patient's questions and concerns were addressed.
Dupixent Counseling: I discussed with the patient the risks of dupilumab including but not limited to eye inflammation and irritation, cold sores, injection site reactions, allergic reactions and increased risk of parasitic infection. The patient understands that monitoring is required and they must alert us or the primary physician if symptoms of infection or other concerning signs are noted.
Tranexamic Acid Pregnancy And Lactation Text: It is unknown if this medication is safe during pregnancy or breast feeding.
Arava Counseling:  Patient counseled regarding adverse effects of Arava including but not limited to nausea, vomiting, abnormalities in liver function tests. Patients may develop mouth sores, rash, diarrhea, and abnormalities in blood counts. The patient understands that monitoring is required including LFTs and blood counts.  There is a rare possibility of scarring of the liver and lung problems that can occur when taking methotrexate. Persistent nausea, loss of appetite, pale stools, dark urine, cough, and shortness of breath should be reported immediately. Patient advised to discontinue Arava treatment and consult with a physician prior to attempting conception. The patient will have to undergo a treatment to eliminate Arava from the body prior to conception.
Opzelura Counseling:  I discussed with the patient the risks of Opzelura including but not limited to nasopharngitis, bronchitis, ear infection, eosinophila, hives, diarrhea, folliculitis, tonsillitis, and rhinorrhea.  Taken orally, this medication has been linked to serious infections; higher rate of mortality; malignancy and lymphoproliferative disorders; major adverse cardiovascular events; thrombosis; thrombocytopenia, anemia, and neutropenia; and lipid elevations.
Topical Sulfur Applications Counseling: Topical Sulfur Counseling: Patient counseled that this medication may cause skin irritation or allergic reactions.  In the event of skin irritation, the patient was advised to reduce the amount of the drug applied or use it less frequently.   The patient verbalized understanding of the proper use and possible adverse effects of topical sulfur application.  All of the patient's questions and concerns were addressed.
Oxybutynin Counseling:  I discussed with the patient the risks of oxybutynin including but not limited to skin rash, drowsiness, dry mouth, difficulty urinating, and blurred vision.
Methotrexate Pregnancy And Lactation Text: This medication is Pregnancy Category X and is known to cause fetal harm. This medication is excreted in breast milk.
Dutasteride Male Counseling: Dustasteride Counseling:  I discussed with the patient the risks of use of dutasteride including but not limited to decreased libido, decreased ejaculate volume, and gynecomastia. Women who can become pregnant should not handle medication.  All of the patient's questions and concerns were addressed.
Doxycycline Pregnancy And Lactation Text: This medication is Pregnancy Category D and not consider safe during pregnancy. It is also excreted in breast milk but is considered safe for shorter treatment courses.
Olumiant Pregnancy And Lactation Text: Based on animal studies, Olumiant may cause embryo-fetal harm when administered to pregnant women.  The medication should not be used in pregnancy.  Breastfeeding is not recommended during treatment.
Nsaids Counseling: NSAID Counseling: I discussed with the patient that NSAIDs should be taken with food. Prolonged use of NSAIDs can result in the development of stomach ulcers.  Patient advised to stop taking NSAIDs if abdominal pain occurs.  The patient verbalized understanding of the proper use and possible adverse effects of NSAIDs.  All of the patient's questions and concerns were addressed.
Cimetidine Counseling:  I discussed with the patient the risks of Cimetidine including but not limited to gynecomastia, headache, diarrhea, nausea, drowsiness, arrhythmias, pancreatitis, skin rashes, psychosis, bone marrow suppression and kidney toxicity.
Azathioprine Counseling:  I discussed with the patient the risks of azathioprine including but not limited to myelosuppression, immunosuppression, hepatotoxicity, lymphoma, and infections.  The patient understands that monitoring is required including baseline LFTs, Creatinine, possible TPMP genotyping and weekly CBCs for the first month and then every 2 weeks thereafter.  The patient verbalized understanding of the proper use and possible adverse effects of azathioprine.  All of the patient's questions and concerns were addressed.
Topical Clindamycin Counseling: Patient counseled that this medication may cause skin irritation or allergic reactions.  In the event of skin irritation, the patient was advised to reduce the amount of the drug applied or use it less frequently.   The patient verbalized understanding of the proper use and possible adverse effects of clindamycin.  All of the patient's questions and concerns were addressed.
Detail Level: Simple
Ivermectin Counseling:  Patient instructed to take medication on an empty stomach with a full glass of water.  Patient informed of potential adverse effects including but not limited to nausea, diarrhea, dizziness, itching, and swelling of the extremities or lymph nodes.  The patient verbalized understanding of the proper use and possible adverse effects of ivermectin.  All of the patient's questions and concerns were addressed.
Xolair Counseling:  Patient informed of potential adverse effects including but not limited to fever, muscle aches, rash and allergic reactions.  The patient verbalized understanding of the proper use and possible adverse effects of Xolair.  All of the patient's questions and concerns were addressed.
Acitretin Pregnancy And Lactation Text: This medication is Pregnancy Category X and should not be given to women who are pregnant or may become pregnant in the future. This medication is excreted in breast milk.
Vtama Pregnancy And Lactation Text: It is unknown if this medication can cause problems during pregnancy and breastfeeding.
Xolair Pregnancy And Lactation Text: This medication is Pregnancy Category B and is considered safe during pregnancy. This medication is excreted in breast milk.
Azithromycin Pregnancy And Lactation Text: This medication is considered safe during pregnancy and is also secreted in breast milk.
Nsaids Pregnancy And Lactation Text: These medications are considered safe up to 30 weeks gestation. It is excreted in breast milk.
Bexarotene Counseling:  I discussed with the patient the risks of bexarotene including but not limited to hair loss, dry lips/skin/eyes, liver abnormalities, hyperlipidemia, pancreatitis, depression/suicidal ideation, photosensitivity, drug rash/allergic reactions, hypothyroidism, anemia, leukopenia, infection, cataracts, and teratogenicity.  Patient understands that they will need regular blood tests to check lipid profile, liver function tests, white blood cell count, thyroid function tests and pregnancy test if applicable.
Opzelura Pregnancy And Lactation Text: There is insufficient data to evaluate drug-associated risk for major birth defects, miscarriage, or other adverse maternal or fetal outcomes.  There is a pregnancy registry that monitors pregnancy outcomes in pregnant persons exposed to the medication during pregnancy.  It is unknown if this medication is excreted in breast milk.  Do not breastfeed during treatment and for about 4 weeks after the last dose.
Dupixent Pregnancy And Lactation Text: This medication likely crosses the placenta but the risk for the fetus is uncertain. This medication is excreted in breast milk.
Topical Sulfur Applications Pregnancy And Lactation Text: This medication is considered safe during pregnancy and breast feeding secondary to limited systemic absorption.
Glycopyrrolate Counseling:  I discussed with the patient the risks of glycopyrrolate including but not limited to skin rash, drowsiness, dry mouth, difficulty urinating, and blurred vision.
Skyrizi Counseling: I discussed with the patient the risks of risankizumab-rzaa including but not limited to immunosuppression, and serious infections.  The patient understands that monitoring is required including a PPD at baseline and must alert us or the primary physician if symptoms of infection or other concerning signs are noted.
Isotretinoin Counseling: Patient should get monthly blood tests, not donate blood, not drive at night if vision affected, not share medication, and not undergo elective surgery for 6 months after tx completed. Side effects reviewed, pt to contact office should one occur.
Rifampin Counseling: I discussed with the patient the risks of rifampin including but not limited to liver damage, kidney damage, red-orange body fluids, nausea/vomiting and severe allergy.
Valtrex Counseling: I discussed with the patient the risks of valacyclovir including but not limited to kidney damage, nausea, vomiting and severe allergy.  The patient understands that if the infection seems to be worsening or is not improving, they are to call.
Imiquimod Counseling:  I discussed with the patient the risks of imiquimod including but not limited to erythema, scaling, itching, weeping, crusting, and pain.  Patient understands that the inflammatory response to imiquimod is variable from person to person and was educated regarded proper titration schedule.  If flu-like symptoms develop, patient knows to discontinue the medication and contact us.
Griseofulvin Counseling:  I discussed with the patient the risks of griseofulvin including but not limited to photosensitivity, cytopenia, liver damage, nausea/vomiting and severe allergy.  The patient understands that this medication is best absorbed when taken with a fatty meal (e.g., ice cream or french fries).
Griseofulvin Pregnancy And Lactation Text: This medication is Pregnancy Category X and is known to cause serious birth defects. It is unknown if this medication is excreted in breast milk but breast feeding should be avoided.
Rifampin Pregnancy And Lactation Text: This medication is Pregnancy Category C and it isn't know if it is safe during pregnancy. It is also excreted in breast milk and should not be used if you are breast feeding.
Propranolol Counseling:  I discussed with the patient the risks of propranolol including but not limited to low heart rate, low blood pressure, low blood sugar, restlessness and increased cold sensitivity. They should call the office if they experience any of these side effects.
Wartpeel Counseling:  I discussed with the patient the risks of Wartpeel including but not limited to erythema, scaling, itching, weeping, crusting, and pain.
Infliximab Counseling:  I discussed with the patient the risks of infliximab including but not limited to myelosuppression, immunosuppression, autoimmune hepatitis, demyelinating diseases, lymphoma, and serious infections.  The patient understands that monitoring is required including a PPD at baseline and must alert us or the primary physician if symptoms of infection or other concerning signs are noted.
Drysol Counseling:  I discussed with the patient the risks of drysol/aluminum chloride including but not limited to skin rash, itching, irritation, burning.
Erythromycin Counseling:  I discussed with the patient the risks of erythromycin including but not limited to GI upset, allergic reaction, drug rash, diarrhea, increase in liver enzymes, and yeast infections.
Prednisone Counseling:  I discussed with the patient the risks of prolonged use of prednisone including but not limited to weight gain, insomnia, osteoporosis, mood changes, diabetes, susceptibility to infection, glaucoma and high blood pressure.  In cases where prednisone use is prolonged, patients should be monitored with blood pressure checks, serum glucose levels and an eye exam.  Additionally, the patient may need to be placed on GI prophylaxis, PCP prophylaxis, and calcium and vitamin D supplementation and/or a bisphosphonate.  The patient verbalized understanding of the proper use and the possible adverse effects of prednisone.  All of the patient's questions and concerns were addressed.
Dutasteride Pregnancy And Lactation Text: This medication is absolutely contraindicated in women, especially during pregnancy and breast feeding. Feminization of male fetuses is possible if taking while pregnant.
Solaraze Counseling:  I discussed with the patient the risks of Solaraze including but not limited to erythema, scaling, itching, weeping, crusting, and pain.
Zoryve Counseling:  I discussed with the patient that Zoryve is not for use in the eyes, mouth or vagina. The most commonly reported side effects include diarrhea, headache, insomnia, application site pain, upper respiratory tract infections, and urinary tract infections.  All of the patient's questions and concerns were addressed.
Rinvoq Counseling: I discussed with the patient the risks of Rinvoq therapy including but not limited to upper respiratory tract infections, shingles, cold sores, bronchitis, nausea, cough, fever, acne, and headache. Live vaccines should be avoided.  This medication has been linked to serious infections; higher rate of mortality; malignancy and lymphoproliferative disorders; major adverse cardiovascular events; thrombosis; thrombocytopenia, anemia, and neutropenia; lipid elevations; liver enzyme elevations; and gastrointestinal perforations.
Olanzapine Counseling- I discussed with the patient the common side effects of olanzapine including but are not limited to: lack of energy, dry mouth, increased appetite, sleepiness, tremor, constipation, dizziness, changes in behavior, or restlessness.  Explained that teenagers are more likely to experience headaches, abdominal pain, pain in the arms or legs, tiredness, and sleepiness.  Serious side effects include but are not limited: increased risk of death in elderly patients who are confused, have memory loss, or dementia-related psychosis; hyperglycemia; increased cholesterol and triglycerides; and weight gain.
Isotretinoin Pregnancy And Lactation Text: This medication is Pregnancy Category X and is considered extremely dangerous during pregnancy. It is unknown if it is excreted in breast milk.
Glycopyrrolate Pregnancy And Lactation Text: This medication is Pregnancy Category B and is considered safe during pregnancy. It is unknown if it is excreted breast milk.
Solaraze Pregnancy And Lactation Text: This medication is Pregnancy Category B and is considered safe. There is some data to suggest avoiding during the third trimester. It is unknown if this medication is excreted in breast milk.
Enbrel Counseling:  I discussed with the patient the risks of etanercept including but not limited to myelosuppression, immunosuppression, autoimmune hepatitis, demyelinating diseases, lymphoma, and infections.  The patient understands that monitoring is required including a PPD at baseline and must alert us or the primary physician if symptoms of infection or other concerning signs are noted.
Valtrex Pregnancy And Lactation Text: this medication is Pregnancy Category B and is considered safe during pregnancy. This medication is not directly found in breast milk but it's metabolite acyclovir is present.
Benzoyl Peroxide Counseling: Patient counseled that medicine may cause skin irritation and bleach clothing.  In the event of skin irritation, the patient was advised to reduce the amount of the drug applied or use it less frequently.   The patient verbalized understanding of the proper use and possible adverse effects of benzoyl peroxide.  All of the patient's questions and concerns were addressed.
Bactrim Counseling:  I discussed with the patient the risks of sulfa antibiotics including but not limited to GI upset, allergic reaction, drug rash, diarrhea, dizziness, photosensitivity, and yeast infections.  Rarely, more serious reactions can occur including but not limited to aplastic anemia, agranulocytosis, methemoglobinemia, blood dyscrasias, liver or kidney failure, lung infiltrates or desquamative/blistering drug rashes.
Clofazimine Counseling:  I discussed with the patient the risks of clofazimine including but not limited to skin and eye pigmentation, liver damage, nausea/vomiting, gastrointestinal bleeding and allergy.
Picato Counseling:  I discussed with the patient the risks of Picato including but not limited to erythema, scaling, itching, weeping, crusting, and pain.

## 2023-11-06 NOTE — PROCEDURE: SUTURE REMOVAL (GLOBAL PERIOD)
Detail Level: Detailed
Add 38301 Cpt? (Important Note: In 2017 The Use Of 25907 Is Being Tracked By Cms To Determine Future Global Period Reimbursement For Global Periods): no

## 2023-11-06 NOTE — PROCEDURE: ADDITIONAL NOTES
Additional Notes: pruritic papules &pustules on upper back, chest. path c/w granulomatous dermatitis, w/ read suggestive of ruptured folliculitis. ddx as above. \\n\\nimproved w/ clobetasol. will cx pustules for bact & fungal today. start doxy , cont clob just PRN. \\n\\nstop light therapy
Detail Level: Simple
Render Risk Assessment In Note?: no

## 2023-11-07 LAB
AMBIGUOUS DTTM AMBI4: NORMAL
FUNGUS SPEC FUNGUS STN: NORMAL
GRAM STN SPEC: NORMAL
SIGNIFICANT IND 70042: NORMAL
SITE SITE: NORMAL
SOURCE SOURCE: NORMAL

## 2023-11-26 ENCOUNTER — HOSPITAL ENCOUNTER (EMERGENCY)
Facility: MEDICAL CENTER | Age: 88
End: 2023-11-26
Attending: EMERGENCY MEDICINE
Payer: MEDICARE

## 2023-11-26 VITALS
BODY MASS INDEX: 28.87 KG/M2 | WEIGHT: 190.48 LBS | HEART RATE: 58 BPM | RESPIRATION RATE: 16 BRPM | SYSTOLIC BLOOD PRESSURE: 128 MMHG | HEIGHT: 68 IN | TEMPERATURE: 97.7 F | DIASTOLIC BLOOD PRESSURE: 67 MMHG | OXYGEN SATURATION: 99 %

## 2023-11-26 DIAGNOSIS — S51.019A SKIN TEAR OF ELBOW WITHOUT COMPLICATION, UNSPECIFIED LATERALITY, INITIAL ENCOUNTER: ICD-10-CM

## 2023-11-26 PROCEDURE — 303353 HCHG DERMABOND SKIN ADHESIVE

## 2023-11-26 PROCEDURE — 99284 EMERGENCY DEPT VISIT MOD MDM: CPT

## 2023-11-26 PROCEDURE — 304217 HCHG IRRIGATION SYSTEM

## 2023-11-26 RX ORDER — TRIAMCINOLONE ACETONIDE 1 MG/G
1 CREAM TOPICAL 2 TIMES DAILY
COMMUNITY

## 2023-11-26 RX ORDER — DOXAZOSIN MESYLATE 4 MG/1
4 TABLET ORAL DAILY
COMMUNITY

## 2023-11-26 ASSESSMENT — FIBROSIS 4 INDEX: FIB4 SCORE: 1.26

## 2023-11-26 NOTE — ED NOTES
Medication history reviewed with PT at bedside    Med rec is complete per PT reporting    Allergies reviewed.     PT is on Doxycycline 100mg BID for Ance treatment. Last dose 11/26/2023 at 0830.    Patient is not taking anticoagulants.    Preferred pharmacy for this visit - [facility] ([phone])

## 2023-11-26 NOTE — ED PROVIDER NOTES
ED PHYSICIAN NOTE    CHIEF COMPLAINT  Chief Complaint   Patient presents with    T-5000 GLF     Tripped at home 0630  C/O bilat elbow skin tears, rt great tor and Rt hand lacs       EXTERNAL RECORDS REVIEWED  Outpatient Notes patient seen in urgent care with finger infection.  Eventually came to the ER.  Diagnosed with cellulitis underwent incision and drainage that demonstrated crystal like material he is suspected to have had gout.    HPI/ROS      James Hoffman is a 88 y.o. male who presents after ground-level fall.  Patient tripped walking to the bathroom.  Stubbed his left great toe.  Fell down on his hands and elbows.  He denies hitting his head or loss of consciousness.  No neck or back pain.  No chest injury.  He denies any bony injury but he has fragile skin and sustained skin tears.  He has skin tears on both of his elbows and his left hand.  He has a small wound on his left great toe that was stopped.  He has been ambulatory.  Denies dizziness lightheadedness weakness.    PAST MEDICAL HISTORY  Past Medical History:   Diagnosis Date    Allergic rhinitis     ASTHMA     Basal cell carcinoma of face 05/2004    Dr. Lucas    Bowel habit changes 01/03/2019    Constipation    Cancer ([facility]) approx 2015 last    Basal cell of face.    Cataract 2018    Bilateral phaco with IOL    Colon polyp, hyperplastic 5/04    CVA (cerebral vascular accident) ([facility]) 01/2003    Residual diplopia, Dr. Cleaning    Diabetes ([facility])     diet controlled. 1/3/19-does not check glucose at home.    Dysphagia 2013    Dr. Joseph, vallecular lesion removed    GOUT     Heart burn     treated with zantac.    Hemorrhagic disorder ([facility])     Due to plavix. Bleeds and bruises readily.     HYPOTHYROIDISM 2014    Indigestion     treated with zantac    Left knee pain 01/03/2019    with activity    Rosacea     Dr. Lucas    TIA 12/2000, 2017       SOCIAL HISTORY  Social History     Tobacco Use    Smoking status: Former     Current packs/day: 0.00      "Average packs/day: 1 pack/day for 10.0 years (10.0 ttl pk-yrs)     Types: Cigarettes     Start date: 1/15/1953     Quit date: 1/15/1963     Years since quittin.9    Smokeless tobacco: Never    Tobacco comments:     quit . h/o 1 ppd x 10 years   Vaping Use    Vaping Use: Never used   Substance Use Topics    Alcohol use: Yes     Comment: 1 per day    Drug use: No       CURRENT MEDICATIONS  Home Medications    **Home medications have not yet been reviewed for this encounter**         ALLERGIES  Allergies   Allergen Reactions    Penicillins Anaphylaxis    Neosporin [Bacitracin-Polymyxin B] Unspecified     Turned his arm red        PHYSICAL EXAM  VITAL SIGNS: BP (!) 142/75   Pulse 63   Temp 36.5 °C (97.7 °F) (Temporal)   Resp 16   Ht 1.727 m (5' 8\")   Wt 86.4 kg (190 lb 7.6 oz)   SpO2 99%   BMI 28.96 kg/m²    Constitutional: Awake and alert  HENT: Normal inspection  Eyes: Normal inspection  Neck: Grossly normal range of motion.  Cardiovascular: Normal heart rate, Normal rhythm.  Symmetric peripheral pulses.   Thorax & Lungs: No respiratory distress, No wheezing, No rales, No rhonchi, No chest tenderness.   Abdomen: Bowel sounds normal, soft, non-distended, nontender, no mass  Skin: Flap right ankle skin tear left elbow.  Small skin tear ulnar hand.  Small skin tear with avulsion right elbow.  Abrasion left great toe  Back: No tenderness, No CVA tenderness.   Extremities: No focal bony tenderness.  Full range of motion normal sensory and motor.  Neurologic: Normal as above.  Awake alert oriented  Psychiatric: Normal for situation     DIAGNOSTIC STUDIES / PROCEDURES  Laceration Repair Procedure Note    Indication: Laceration    Procedure: The patient was placed in the appropriate position and anesthesia around the lacerations were cleansed with normal saline.  The only wound amendable to repair was of the left elbow.  The skin flap was Steri-Stripped and subsequent Dermabond was applied.    Total repaired " wound length: 8 cm.       COURSE & MEDICAL DECISION MAKING    INITIAL ASSESSMENT, COURSE AND PLAN  Care Narrative: Patient presents for a ground-level fall.  He has no head injury, neck or spine trauma, trauma to the thorax abdomen.  He does not appear to have any bony tenderness or limited range of motion to suggest fracture.  He has multiple skin tears.  Unfortunately the only 1 amendable to primary closure is of the left elbow.  This was repaired as above.  Patient was given advice regarding wound care.  He will wash wounds every day with soap and water keep clean.  Return to the ER for any signs of infection or concern.        DISPOSITION AND DISCUSSIONS    Escalation of care considered, and ultimately not performed:diagnostic imaging-no evidence of bony injury or trauma to the CNS, thorax or abdomen      Prescription drugs considered and/or prescribed: Considered antibiotics but there is no evidence of wound infection or any highly contaminated wounds to require antibiotic prescription    FINAL IMPRESSION  1.  Multiple skin tears    This dictation was created using voice recognition software. The accuracy of the dictation is limited to the abilities of the software. I expect there may be some errors of grammar and possibly content. The nursing notes were reviewed and certain aspects of this information were incorporated into this note.    Electronically signed by: William Juarez M.D., 11/26/2023

## 2023-12-04 LAB
FUNGUS SPEC CULT: NORMAL
FUNGUS SPEC FUNGUS STN: NORMAL
SIGNIFICANT IND 70042: NORMAL
SITE SITE: NORMAL
SOURCE SOURCE: NORMAL

## 2023-12-05 ENCOUNTER — APPOINTMENT (RX ONLY)
Dept: URBAN - METROPOLITAN AREA CLINIC 15 | Facility: CLINIC | Age: 88
Setting detail: DERMATOLOGY
End: 2023-12-05

## 2023-12-05 DIAGNOSIS — D22 MELANOCYTIC NEVI: ICD-10-CM

## 2023-12-05 DIAGNOSIS — B35.3 TINEA PEDIS: ICD-10-CM

## 2023-12-05 DIAGNOSIS — L82.1 OTHER SEBORRHEIC KERATOSIS: ICD-10-CM

## 2023-12-05 DIAGNOSIS — R21 RASH AND OTHER NONSPECIFIC SKIN ERUPTION: ICD-10-CM | Status: INADEQUATELY CONTROLLED

## 2023-12-05 DIAGNOSIS — L81.4 OTHER MELANIN HYPERPIGMENTATION: ICD-10-CM

## 2023-12-05 DIAGNOSIS — Z71.89 OTHER SPECIFIED COUNSELING: ICD-10-CM

## 2023-12-05 DIAGNOSIS — D18.0 HEMANGIOMA: ICD-10-CM

## 2023-12-05 DIAGNOSIS — D485 NEOPLASM OF UNCERTAIN BEHAVIOR OF SKIN: ICD-10-CM

## 2023-12-05 PROBLEM — D22.62 MELANOCYTIC NEVI OF LEFT UPPER LIMB, INCLUDING SHOULDER: Status: ACTIVE | Noted: 2023-12-05

## 2023-12-05 PROBLEM — D22.71 MELANOCYTIC NEVI OF RIGHT LOWER LIMB, INCLUDING HIP: Status: ACTIVE | Noted: 2023-12-05

## 2023-12-05 PROBLEM — D48.5 NEOPLASM OF UNCERTAIN BEHAVIOR OF SKIN: Status: ACTIVE | Noted: 2023-12-05

## 2023-12-05 PROBLEM — D22.61 MELANOCYTIC NEVI OF RIGHT UPPER LIMB, INCLUDING SHOULDER: Status: ACTIVE | Noted: 2023-12-05

## 2023-12-05 PROBLEM — D22.5 MELANOCYTIC NEVI OF TRUNK: Status: ACTIVE | Noted: 2023-12-05

## 2023-12-05 PROBLEM — D18.01 HEMANGIOMA OF SKIN AND SUBCUTANEOUS TISSUE: Status: ACTIVE | Noted: 2023-12-05

## 2023-12-05 PROBLEM — D22.72 MELANOCYTIC NEVI OF LEFT LOWER LIMB, INCLUDING HIP: Status: ACTIVE | Noted: 2023-12-05

## 2023-12-05 PROCEDURE — ? MEDICATION COUNSELING

## 2023-12-05 PROCEDURE — ? LIQUID NITROGEN

## 2023-12-05 PROCEDURE — 17110 DESTRUCTION B9 LES UP TO 14: CPT

## 2023-12-05 PROCEDURE — ? COUNSELING

## 2023-12-05 PROCEDURE — 99214 OFFICE O/P EST MOD 30 MIN: CPT | Mod: 25

## 2023-12-05 PROCEDURE — ? PRESCRIPTION

## 2023-12-05 PROCEDURE — ? ADDITIONAL NOTES

## 2023-12-05 PROCEDURE — ? TREATMENT REGIMEN

## 2023-12-05 RX ORDER — PREDNISONE 10 MG/1
1 TABLET ORAL BID
Qty: 65 | Refills: 0 | Status: ERX

## 2023-12-05 ASSESSMENT — LOCATION DETAILED DESCRIPTION DERM
LOCATION DETAILED: LEFT ANTERIOR PROXIMAL THIGH
LOCATION DETAILED: LEFT ANTERIOR DISTAL UPPER ARM
LOCATION DETAILED: LEFT VENTRAL DISTAL FOREARM
LOCATION DETAILED: RIGHT PROXIMAL CALF
LOCATION DETAILED: RIGHT RADIAL DORSAL HAND
LOCATION DETAILED: LEFT INFERIOR UPPER BACK
LOCATION DETAILED: RIGHT MID-UPPER BACK
LOCATION DETAILED: RIGHT ANTERIOR DISTAL UPPER ARM
LOCATION DETAILED: LEFT CENTRAL MALAR CHEEK
LOCATION DETAILED: LEFT DISTAL POSTERIOR THIGH
LOCATION DETAILED: RIGHT SUPERIOR LATERAL MIDBACK
LOCATION DETAILED: RIGHT ANTERIOR PROXIMAL THIGH
LOCATION DETAILED: RIGHT INFERIOR MEDIAL UPPER BACK
LOCATION DETAILED: RIGHT PROXIMAL DORSAL FOREARM
LOCATION DETAILED: RIGHT VENTRAL PROXIMAL FOREARM
LOCATION DETAILED: LEFT PROXIMAL CALF
LOCATION DETAILED: LEFT PROXIMAL DORSAL FOREARM
LOCATION DETAILED: RIGHT DISTAL POSTERIOR THIGH
LOCATION DETAILED: RIGHT MEDIAL INFERIOR CHEST
LOCATION DETAILED: 1ST WEBSPACE RIGHT FOOT
LOCATION DETAILED: 1ST WEBSPACE LEFT FOOT
LOCATION DETAILED: RIGHT VENTRAL DISTAL FOREARM

## 2023-12-05 ASSESSMENT — LOCATION ZONE DERM
LOCATION ZONE: LEG
LOCATION ZONE: TRUNK
LOCATION ZONE: ARM
LOCATION ZONE: FEET
LOCATION ZONE: FACE
LOCATION ZONE: HAND

## 2023-12-05 ASSESSMENT — LOCATION SIMPLE DESCRIPTION DERM
LOCATION SIMPLE: LEFT CALF
LOCATION SIMPLE: LEFT POSTERIOR THIGH
LOCATION SIMPLE: RIGHT HAND
LOCATION SIMPLE: CHEST
LOCATION SIMPLE: RIGHT UPPER BACK
LOCATION SIMPLE: LEFT FOOT
LOCATION SIMPLE: RIGHT UPPER ARM
LOCATION SIMPLE: LEFT FOREARM
LOCATION SIMPLE: RIGHT FOREARM
LOCATION SIMPLE: RIGHT POSTERIOR THIGH
LOCATION SIMPLE: LEFT CHEEK
LOCATION SIMPLE: RIGHT THIGH
LOCATION SIMPLE: RIGHT LOWER BACK
LOCATION SIMPLE: RIGHT FOOT
LOCATION SIMPLE: RIGHT CALF
LOCATION SIMPLE: LEFT UPPER ARM
LOCATION SIMPLE: LEFT THIGH
LOCATION SIMPLE: LEFT UPPER BACK

## 2023-12-05 NOTE — PROCEDURE: LIQUID NITROGEN
Render Post-Care Instructions In Note?: no
Detail Level: Detailed
Show Aperture Variable?: Yes
Medical Necessity Information: It is in your best interest to select a reason for this procedure from the list below. All of these items fulfill various CMS LCD requirements except the new and changing color options.
Post-Care Instructions: I reviewed with the patient in detail post-care instructions. Patient is to wear sunprotection, and avoid picking at any of the treated lesions. Pt may apply Vaseline to crusted or scabbing areas.
Consent: The patient's consent was obtained including but not limited to risks of crusting, scabbing, blistering, scarring, darker or lighter pigmentary change, recurrence, incomplete removal and infection.
Pared With?: curette
Medical Necessity Clause: This procedure was medically necessary because the lesions that were treated were:
Spray Paint Text: The liquid nitrogen was applied to the skin utilizing a spray paint frosting technique.
Number Of Freeze-Thaw Cycles: 2 freeze-thaw cycles

## 2023-12-05 NOTE — PROCEDURE: ADDITIONAL NOTES
Additional Notes: pruritic papules &pustules on upper back, chest. path c/w granulomatous dermatitis, w/ read suggestive of ruptured folliculitis. \\n\\nimproved w/ clobetasol, perhaps w/ doxy however doesn't look mucb btter today. did not improve w/ nbuvb. \\n\\ndo not favor folliculitis given no real improvement w/ doxy. i am most suspicious for DHR but also consider early phase bp, grovers, less likely ctcl. \\n\\nif no better w/ pred, need to rebiopsy. \\n\\n\\n12/5/23 - pt reports rash got better and then got worse. Has approx 3 days of doxy remaining. Pre-diabetic but not on any medication. Has macular degeneration but denies glaucoma.
Detail Level: Simple
Render Risk Assessment In Note?: no
Additional Notes: Uncertain if wart vs SCC, pt prefers to defer biopsy and opted for LN2 and recheck at fu visit
Additional Notes: Recommended OTC antifungal

## 2023-12-05 NOTE — PROCEDURE: TREATMENT REGIMEN
Continue Regimen: clob 0.05 oint bid prn (no more than 2 weeks)
Discontinue Regimen: doxy 100 mg bid
Detail Level: Detailed
Plan: he will f/u w/ pcp re: thyroid labs, vit \\nd
Initiate Treatment: Prednisone 60mg x 5 days, 40mg x 5 days, 20mg x 5 days, 10mg x 5 days

## 2023-12-09 ENCOUNTER — HOSPITAL ENCOUNTER (EMERGENCY)
Facility: MEDICAL CENTER | Age: 88
End: 2023-12-09
Attending: EMERGENCY MEDICINE
Payer: MEDICARE

## 2023-12-09 VITALS
SYSTOLIC BLOOD PRESSURE: 128 MMHG | HEART RATE: 71 BPM | TEMPERATURE: 97.5 F | HEIGHT: 68 IN | OXYGEN SATURATION: 97 % | BODY MASS INDEX: 29.77 KG/M2 | RESPIRATION RATE: 21 BRPM | DIASTOLIC BLOOD PRESSURE: 69 MMHG | WEIGHT: 196.43 LBS

## 2023-12-09 DIAGNOSIS — S81.811A SKIN TEAR OF LOWER LEG WITHOUT COMPLICATION, RIGHT, INITIAL ENCOUNTER: ICD-10-CM

## 2023-12-09 PROCEDURE — 99284 EMERGENCY DEPT VISIT MOD MDM: CPT

## 2023-12-09 ASSESSMENT — FIBROSIS 4 INDEX: FIB4 SCORE: 1.26

## 2023-12-10 NOTE — ED TRIAGE NOTES
"Chief Complaint   Patient presents with    Open Wound     Skin tear to R lower leg. Reports \"a dog jumped on me and my skin is just really fragile\". Bulky dressing applied with coban. Pt. Reports he is on plavix.       "

## 2023-12-10 NOTE — ED PROVIDER NOTES
"ED Provider Note    CHIEF COMPLAINT  Chief Complaint   Patient presents with    Open Wound     Skin tear to R lower leg. Reports \"a dog jumped on me and my skin is just really fragile\". Bulky dressing applied with coban. Pt. Reports he is on plavix.           HPI/ROS  LIMITATION TO HISTORY   Select: : None  OUTSIDE HISTORIAN(S):  Significant other spouse at bedside for discussion history and symptoms    James Hoffman is a 88 y.o. male who presents with skin tear right leg.  States the dog jumped on him, pushed against his pant leg which tore the skin on the inside.  No bite or nail scratch according to the patient.  He had similar skin tear left elbow 1 week ago which is healing well.  Patient denies other injury    PAST MEDICAL HISTORY   has a past medical history of Allergic rhinitis, ASTHMA, Basal cell carcinoma of face (05/2004), Bowel habit changes (01/03/2019), Cancer ([facility]) (approx 2015 last), Cataract (2018), Colon polyp, hyperplastic (5/04), CVA (cerebral vascular accident) ([facility]) (01/2003), Diabetes ([facility]), Dysphagia (2013), GOUT, Heart burn, Hemorrhagic disorder ([facility]), HYPOTHYROIDISM (2014), Indigestion, Left knee pain (01/03/2019), Rosacea, and TIA (12/2000, 2017).    SURGICAL HISTORY   has a past surgical history that includes colonoscopy with polyp (5/2004); tonsillectomy (as child); microlaryngoscopy (1/21/2014); reconstruction, knee, acl (Right, 10/2002); knee arthroscopy (Left, 2013); and knee arthroplasty total (Left, 1/14/2019).    FAMILY HISTORY  Family History   Problem Relation Age of Onset    Diabetes Mother     Genetic Disorder Mother         dementia    Heart Disease Sister     Cancer Sister         breast cancer    Diabetes Maternal Aunt     Diabetes Maternal Grandmother     Diabetes Maternal Grandfather     Genetic Disorder Son         ALS       SOCIAL HISTORY  Social History     Tobacco Use    Smoking status: Former     Current packs/day: 0.00     Average packs/day: 1 pack/day for 10.0 years " "(10.0 ttl pk-yrs)     Types: Cigarettes     Start date: 1/15/1953     Quit date: 1/15/1963     Years since quittin.9    Smokeless tobacco: Never    Tobacco comments:     quit . h/o 1 ppd x 10 years   Vaping Use    Vaping Use: Never used   Substance and Sexual Activity    Alcohol use: Yes     Comment: 1 per day    Drug use: No    Sexual activity: Yes     Partners: Female       CURRENT MEDICATIONS  Home Medications       Reviewed by Erinn Carmona R.N. (Registered Nurse) on 23 at 2009  Med List Status: Not Addressed     Medication Last Dose Status   Azelaic Acid (FINACEA) 15 % Gel  Active   clopidogrel (PLAVIX) 75 MG TABS  Active   doxazosin (CARDURA) 4 MG Tab  Active   doxycycline (VIBRAMYCIN) 100 MG Cap  Active   FLOVENT  MCG/ACT AERO  Active   fluticasone (FLONASE) 50 MCG/ACT nasal spray  Active   levothyroxine (SYNTHROID) 125 MCG Tab  Active   metronidazole (METROGEL) 1 % gel  Active   multivitamin (THERAGRAN) Tab  Active   POLYETHYLENE GLYCOL 3350-GRX PO  Active   Probiotic Product (PROBIOTIC PO)  Active   ranitidine (ZANTAC) 300 MG tablet  Active   triamcinolone acetonide (KENALOG) 0.1 % Cream  Active                    ALLERGIES  Allergies   Allergen Reactions    Penicillins Anaphylaxis    Neosporin [Bacitracin-Polymyxin B] Unspecified     Turned his arm red        PHYSICAL EXAM  VITAL SIGNS: /78   Pulse 68   Temp 36.3 °C (97.3 °F) (Temporal)   Resp 20   Ht 1.727 m (5' 8\")   Wt 89.1 kg (196 lb 6.9 oz)   SpO2 98%   BMI 29.87 kg/m²    Skin: Thinwalled L-shaped skin tear right lateral calf right leg.  No suturable laceration.  No surrounding bruising.  Well-healing skin tear left elbow, no evidence of wound infection  Musculoskeletal: Right knee and right ankle, right hip are nontender.  Neurologic: Sensation is intact    DIAGNOSTIC STUDIES / PROCEDURES      COURSE & MEDICAL DECISION MAKING    ED Observation Status? No; Patient does not meet criteria for ED Observation. "     INITIAL ASSESSMENT, COURSE AND PLAN  Care Narrative: Patient with skin tear to right leg, this was cleansed by nursing staff and Steri-Strips placed.  Patient states he is comfortable with the care of healing skin tears.  He is advised of signs and symptoms of infection and the need to return should he have any concerns of infection.  No evidence of fracture, he does not require imaging.        DISPOSITION AND DISCUSSIONS  Escalation of care considered, and ultimately not performed:diagnostic imaging    Barriers to care at this time, including but not limited to:  None .     Decision tools and prescription drugs considered including, but not limited to: Antibiotics were not required, clean appearing skin tear, no evidence of cellulitis .    FINAL DIAGNOSIS  1. Skin tear of lower leg without complication, right, initial encounter           Electronically signed by: Liban Ozuna M.D., 12/9/2023 8:50 PM

## 2023-12-10 NOTE — ED NOTES
Pt. Verbalizes understanding of discharge instructions. Accompanied to lobby with spouse. Pt. Alert/awake in NAD.  All questions answered and understood.Advised to ff-up with PCP.

## 2023-12-19 ENCOUNTER — APPOINTMENT (RX ONLY)
Dept: URBAN - METROPOLITAN AREA CLINIC 15 | Facility: CLINIC | Age: 88
Setting detail: DERMATOLOGY
End: 2023-12-19

## 2023-12-19 DIAGNOSIS — R21 RASH AND OTHER NONSPECIFIC SKIN ERUPTION: ICD-10-CM | Status: INADEQUATELY CONTROLLED

## 2023-12-19 PROCEDURE — 11105 PUNCH BX SKIN EA SEP/ADDL: CPT

## 2023-12-19 PROCEDURE — 99214 OFFICE O/P EST MOD 30 MIN: CPT | Mod: 25

## 2023-12-19 PROCEDURE — ? SEPARATE AND IDENTIFIABLE DOCUMENTATION

## 2023-12-19 PROCEDURE — ? COUNSELING

## 2023-12-19 PROCEDURE — ? BIOPSY BY PUNCH METHOD

## 2023-12-19 PROCEDURE — 11104 PUNCH BX SKIN SINGLE LESION: CPT

## 2023-12-19 PROCEDURE — ? BIOPSY BY PUNCH METHOD FOR DIF

## 2023-12-19 PROCEDURE — ? PRESCRIPTION MEDICATION MANAGEMENT

## 2023-12-19 ASSESSMENT — LOCATION SIMPLE DESCRIPTION DERM: LOCATION SIMPLE: RIGHT UPPER BACK

## 2023-12-19 ASSESSMENT — LOCATION DETAILED DESCRIPTION DERM
LOCATION DETAILED: RIGHT INFERIOR LATERAL UPPER BACK
LOCATION DETAILED: RIGHT LATERAL UPPER BACK

## 2023-12-19 ASSESSMENT — LOCATION ZONE DERM: LOCATION ZONE: TRUNK

## 2023-12-19 NOTE — PROCEDURE: PRESCRIPTION MEDICATION MANAGEMENT
Discontinue Regimen: pred
Initiate Treatment: clob 0.05 oint bid prn
Plan: pt defers gabapentin for now.
Detail Level: Zone
Render In Strict Bullet Format?: No

## 2023-12-19 NOTE — PROCEDURE: BIOPSY BY PUNCH METHOD FOR DIF
Detail Level: Detailed
Was A Bandage Applied: Yes
Lab: [id number]
Punch Size In Mm: 4
Size Of Lesion In Cm (Optional): 0
Depth Of Punch Biopsy: dermis
Biopsy Type: DIF (perilesional)
Anesthesia Type: 1% lidocaine with epinephrine
Anesthesia Volume In Cc: 0.5
Hemostasis: None
Epidermal Sutures: 4-0 Chromic Gut
Wound Care: Petrolatum
Dressing: bandage
Patient Will Remove Sutures At Home?: No
Consent: Written consent was obtained and risks were reviewed including but not limited to scarring, infection, bleeding, scabbing, incomplete removal, nerve damage and allergy to anesthesia.
Post-Care Instructions: I reviewed with the patient in detail post-care instructions. Patient is to keep the biopsy site dry overnight, and then apply bacitracin twice daily until healed. Patient may apply hydrogen peroxide soaks to remove any crusting.
Home Suture Removal Text: Patient was provided a home suture removal kit and will remove their sutures at home.  If they have any questions or difficulties they will call the office.
Notification Instructions: Patient will be notified of biopsy results. However, patient instructed to call the office if not contacted within 2 weeks.
Billing Type: Third-Party Bill
Lab Facility:

## 2023-12-19 NOTE — PROCEDURE: BIOPSY BY PUNCH METHOD
Detail Level: Detailed
Was A Bandage Applied: Yes
Lab: [id number]
Punch Size In Mm: 4
Size Of Lesion In Cm (Optional): 0
Depth Of Punch Biopsy: dermis
Biopsy Type: H and E
Anesthesia Type: 1% lidocaine with epinephrine
Anesthesia Volume In Cc: 0.5
Hemostasis: Wound Closure
Epidermal Sutures: 4-0 Chromic Gut
Wound Care: Petrolatum
Dressing: bandage
Patient Will Remove Sutures At Home?: No
Consent: Written consent was obtained and risks were reviewed including but not limited to scarring, infection, bleeding, scabbing, incomplete removal, nerve damage and allergy to anesthesia.
Post-Care Instructions: Reviewed with patient the post-care instructions. Patient is to keep the biopsy site covered until tomorrow at which time they will remove the bandage, wash with gentle soap & water, then apply liberal amount of petroleum jelly and again cover with bandage. They will do this daily until sutures removed. Recommend sun protection of biopsy site.
Home Suture Removal Text: Patient was provided a home suture removal kit and will remove their sutures at home.  If they have any questions or difficulties, they will call the office.
Notification Instructions: Patient will be notified of biopsy results. However, patient instructed to call the office if not contacted within 2 weeks.
Billing Type: Third-Party Bill
Information: Selecting Yes will display possible errors in your note based on the variables you have selected. This validation is only offered as a suggestion for you. PLEASE NOTE THAT THE VALIDATION TEXT WILL BE REMOVED WHEN YOU FINALIZE YOUR NOTE. IF YOU WANT TO FAX A PRELIMINARY NOTE YOU WILL NEED TO TOGGLE THIS TO 'NO' IF YOU DO NOT WANT IT IN YOUR FAXED NOTE.
Lab Facility:

## 2024-01-04 ENCOUNTER — APPOINTMENT (RX ONLY)
Dept: URBAN - METROPOLITAN AREA CLINIC 15 | Facility: CLINIC | Age: 89
Setting detail: DERMATOLOGY
End: 2024-01-04

## 2024-01-04 DIAGNOSIS — R21 RASH AND OTHER NONSPECIFIC SKIN ERUPTION: ICD-10-CM | Status: INADEQUATELY CONTROLLED

## 2024-01-04 PROCEDURE — ? COUNSELING

## 2024-01-04 PROCEDURE — 99215 OFFICE O/P EST HI 40 MIN: CPT

## 2024-01-04 PROCEDURE — ? ADDITIONAL NOTES

## 2024-01-04 PROCEDURE — ? ORDER TESTS

## 2024-01-04 PROCEDURE — ? PRESCRIPTION MEDICATION MANAGEMENT

## 2024-01-04 ASSESSMENT — LOCATION DETAILED DESCRIPTION DERM
LOCATION DETAILED: STERNUM
LOCATION DETAILED: RIGHT SUPERIOR MEDIAL UPPER BACK

## 2024-01-04 ASSESSMENT — LOCATION SIMPLE DESCRIPTION DERM
LOCATION SIMPLE: RIGHT UPPER BACK
LOCATION SIMPLE: CHEST

## 2024-01-04 ASSESSMENT — LOCATION ZONE DERM: LOCATION ZONE: TRUNK

## 2024-01-04 NOTE — PROCEDURE: ADDITIONAL NOTES
Additional Notes: Some pustules at todays visit- consider some yeast present
Detail Level: Simple
Render Risk Assessment In Note?: no
Additional Notes: pt has had chronic polymorphous eruption, somewhat subtle most visits, associated w/ significant itching. \\nw/u:\\nbiopsy #1 read as granulomatous dermatitis, likely 2/2 follicular rupture.\\nbiopsy #2 read as urticarial dermatitis\\nDIF neg\\nCBC x 2, WNL w/ e/o downward trending HgB\\nTSH  WNL\\nESR/CRP NL\\nCMP WNL\\n\\nModerate improvement of itching w/ top steroids, no improvement w/ po  pred, doxycyline. did not tolerate zyrtec 2/2 sedation.\\n\\nToday he has a pink papules on the upper back, one pustule on central chest. previously has had more urticarial plaques on flanks which he says itches him more. \\n\\nMy ddx is dermal hypersensitivity eruption (unclear trigger) vs or miriam ddition to perhaps a pityrosporum folliculitis. Trial antihistamines as below & recheck some labs. if no better, consider treatment w/ azole for PF
Billing Type: Third-Party Bill
Expected Date Of Service: 01/05/2024
Performing Laboratory: 0

## 2024-01-04 NOTE — PROCEDURE: ORDER TESTS
Billing Type: Third-Party Bill
Lab Facility: 0
Expected Date Of Service: 01/05/2024
Bill For Surgical Tray: no

## 2024-01-04 NOTE — PROCEDURE: PRESCRIPTION MEDICATION MANAGEMENT
Detail Level: Zone
Initiate Treatment: Claritin 1 pill in the AM & Allegra 1 pill in the PM
Plan: If not improving in 1 week- he will call and we will discuss other options
Render In Strict Bullet Format?: No

## 2024-01-05 ENCOUNTER — APPOINTMENT (RX ONLY)
Dept: URBAN - METROPOLITAN AREA CLINIC 15 | Facility: CLINIC | Age: 89
Setting detail: DERMATOLOGY
End: 2024-01-05

## 2024-01-05 DIAGNOSIS — R21 RASH AND OTHER NONSPECIFIC SKIN ERUPTION: ICD-10-CM

## 2024-01-05 PROCEDURE — ? ORDER TESTS

## 2024-01-17 ENCOUNTER — APPOINTMENT (RX ONLY)
Dept: URBAN - METROPOLITAN AREA CLINIC 15 | Facility: CLINIC | Age: 89
Setting detail: DERMATOLOGY
End: 2024-01-17

## 2024-01-17 ENCOUNTER — RX ONLY (OUTPATIENT)
Age: 89
Setting detail: RX ONLY
End: 2024-01-17

## 2024-01-17 ENCOUNTER — HOSPITAL ENCOUNTER (OUTPATIENT)
Facility: MEDICAL CENTER | Age: 89
End: 2024-01-17
Attending: DERMATOLOGY
Payer: MEDICARE

## 2024-01-17 DIAGNOSIS — R21 RASH AND OTHER NONSPECIFIC SKIN ERUPTION: ICD-10-CM

## 2024-01-17 PROCEDURE — ? PRESCRIPTION

## 2024-01-17 PROCEDURE — 87077 CULTURE AEROBIC IDENTIFY: CPT

## 2024-01-17 PROCEDURE — ? COUNSELING

## 2024-01-17 PROCEDURE — ? PRESCRIPTION MEDICATION MANAGEMENT

## 2024-01-17 PROCEDURE — ? ADDITIONAL NOTES

## 2024-01-17 PROCEDURE — 99215 OFFICE O/P EST HI 40 MIN: CPT

## 2024-01-17 PROCEDURE — ? DISCUSSION OF MANAGEMENT WITH EXTERNAL PROVIDER

## 2024-01-17 PROCEDURE — 87102 FUNGUS ISOLATION CULTURE: CPT

## 2024-01-17 PROCEDURE — ? MEDICATION COUNSELING

## 2024-01-17 PROCEDURE — ? ORDER TESTS

## 2024-01-17 PROCEDURE — 87205 SMEAR GRAM STAIN: CPT | Mod: 91

## 2024-01-17 PROCEDURE — 87070 CULTURE OTHR SPECIMN AEROBIC: CPT

## 2024-01-17 RX ORDER — FLUCONAZOLE 200 MG/1
TABLET ORAL
Qty: 30 | Refills: 0 | Status: CANCELLED

## 2024-01-17 RX ORDER — ITRACONAZOLE 100 MG/1
CAPSULE ORAL
Qty: 14 | Refills: 0 | Status: CANCELLED

## 2024-01-17 RX ORDER — KETOCONAZOLE 20 MG/G
CREAM TOPICAL
Qty: 60 | Refills: 4 | Status: ERX | COMMUNITY
Start: 2024-01-17

## 2024-01-17 RX ADMIN — KETOCONAZOLE: 20 CREAM TOPICAL at 00:00

## 2024-01-17 ASSESSMENT — LOCATION DETAILED DESCRIPTION DERM
LOCATION DETAILED: RIGHT SUPERIOR MEDIAL UPPER BACK
LOCATION DETAILED: STERNUM
LOCATION DETAILED: LEFT ANTERIOR SHOULDER

## 2024-01-17 ASSESSMENT — LOCATION SIMPLE DESCRIPTION DERM
LOCATION SIMPLE: CHEST
LOCATION SIMPLE: RIGHT UPPER BACK
LOCATION SIMPLE: LEFT SHOULDER

## 2024-01-17 ASSESSMENT — LOCATION ZONE DERM
LOCATION ZONE: TRUNK
LOCATION ZONE: ARM

## 2024-01-17 NOTE — PROCEDURE: ORDER TESTS
Billing Type: Third-Party Bill
Clinical Notes (To The Lab): PRIORITY FUNGAL CULTURE
Expected Date Of Service: 01/17/2024
Performing Laboratory: 0
Bill For Surgical Tray: no

## 2024-01-17 NOTE — PROCEDURE: ADDITIONAL NOTES
Additional Notes: He has been taking the antihistamines- doesn’t seem to be helping.
Detail Level: Simple
Render Risk Assessment In Note?: no
Additional Notes: pt has had chronic polymorphous eruption, somewhat subtle most visits, associated w/ significant itching. He has had mostly urticarial plaques on the back w/ few scattered pustules on upper back, arms. these are not always present. \\n\\nw/u:\\nbiopsy #1 read as granulomatous dermatitis, likely 2/2 follicular rupture.\\nbiopsy #2 read as urticarial dermatitis\\nDIF neg\\nCBC x 2, WNL w/ e/o downward trending HgB\\nTSH  WNL\\nESR/CRP NL\\nCMP WNL\\n\\nModerate improvement of itching w/ top steroids, no improvement w/ po pred, doxycycline. nbuvb. did not tolerate zyrtec 2/2 sedation. we did BID dosing of allegra & claritin w/ no improvement. \\n\\nThis has been remarkably difficult to treat, and patient is still itchy. Two very different path reads. considered [facility] as this would incorporate both however clinically does not fit w/ this. i thought that this was perhaps a lasting dermal hypersensitivity eruption 2/2 abx rx'ed prior to rash onset but no improvement  w/ po pred. ddx at this point is pityrosporum folliculitis vs demodex folliculitis vs recalcitrant urticaria. I do not see any drug culprits however perhaps allopurinol ( think he's been on this for some time??). will check on any supp or OTC. we did culture another pustule today and sent for fungal cx. consider PCR testing when we have that available. His thyroid is well controlled per last check. He states he is UTD w/ age approp ca screening.\\n\\nwill have him come in for pcr today as well
Billing Type: Third-Party Bill
Expected Date Of Service: 01/05/2024

## 2024-01-17 NOTE — PROCEDURE: PRESCRIPTION MEDICATION MANAGEMENT
Discontinue Regimen: Claritin 1 pill in the AM & Allegra 1 pill in the PM\\ntop steroids
Detail Level: Zone
Initiate Treatment: fluc 200 mg qd x 2 weeks (will call to check in w/ him on 1 week)\\nketo crm bid
Plan: If not improving in 1 week- he will call and we will discuss other options
Render In Strict Bullet Format?: No

## 2024-01-17 NOTE — PROCEDURE: DISCUSSION OF MANAGEMENT WITH EXTERNAL PROVIDER
Pcp Name: dr. dahiana josé
Detail Level: Zone
Pcp Discussion Details (Optional): migue andrés's case w/ pcp. disc that would like to tx for pityrosporum follic but aware of med interaction w/ azole & clopidogrel. she reports he has had TIAs but feels risk is low vidal w/ short course of fluc so rec we go ahead w/ tx w/ close f/u.

## 2024-01-17 NOTE — PROCEDURE: MEDICATION COUNSELING
Ketoconazole Counseling:   Patient counseled regarding improving absorption with orange juice.  Adverse effects include but are not limited to breast enlargement, headache, diarrhea, nausea, upset stomach, liver function test abnormalities, taste disturbance, and stomach pain.  There is a rare possibility of liver failure that can occur when taking ketoconazole. The patient understands that monitoring of LFTs may be required, especially at baseline. The patient verbalized understanding of the proper use and possible adverse effects of ketoconazole.  All of the patient's questions and concerns were addressed.
Erythromycin Pregnancy And Lactation Text: This medication is Pregnancy Category B and is considered safe during pregnancy. It is also excreted in breast milk.
Bactrim Counseling:  I discussed with the patient the risks of sulfa antibiotics including but not limited to GI upset, allergic reaction, drug rash, diarrhea, dizziness, photosensitivity, and yeast infections.  Rarely, more serious reactions can occur including but not limited to aplastic anemia, agranulocytosis, methemoglobinemia, blood dyscrasias, liver or kidney failure, lung infiltrates or desquamative/blistering drug rashes.
Metronidazole Pregnancy And Lactation Text: This medication is Pregnancy Category B and considered safe during pregnancy.  It is also excreted in breast milk.
Cimetidine Counseling:  I discussed with the patient the risks of Cimetidine including but not limited to gynecomastia, headache, diarrhea, nausea, drowsiness, arrhythmias, pancreatitis, skin rashes, psychosis, bone marrow suppression and kidney toxicity.
Cephalexin Counseling: I counseled the patient regarding use of cephalexin as an antibiotic for prophylactic and/or therapeutic purposes. Cephalexin (commonly prescribed under brand name Keflex) is a cephalosporin antibiotic which is active against numerous classes of bacteria, including most skin bacteria. Side effects may include nausea, diarrhea, gastrointestinal upset, rash, hives, yeast infections, and in rare cases, hepatitis, kidney disease, seizures, fever, confusion, neurologic symptoms, and others. Patients with severe allergies to penicillin medications are cautioned that there is about a 10% incidence of cross-reactivity with cephalosporins. When possible, patients with penicillin allergies should use alternatives to cephalosporins for antibiotic therapy.
Doxepin Counseling:  Patient advised that the medication is sedating and not to drive a car after taking this medication. Patient informed of potential adverse effects including but not limited to dry mouth, urinary retention, and blurry vision.  The patient verbalized understanding of the proper use and possible adverse effects of doxepin.  All of the patient's questions and concerns were addressed.
Minocycline Pregnancy And Lactation Text: This medication is Pregnancy Category D and not consider safe during pregnancy. It is also excreted in breast milk.
Albendazole Counseling:  I discussed with the patient the risks of albendazole including but not limited to cytopenia, kidney damage, nausea/vomiting and severe allergy.  The patient understands that this medication is being used in an off-label manner.
Ivermectin Pregnancy And Lactation Text: This medication is Pregnancy Category C and it isn't known if it is safe during pregnancy. It is also excreted in breast milk.
Clindamycin Counseling: I counseled the patient regarding use of clindamycin as an antibiotic for prophylactic and/or therapeutic purposes. Clindamycin is active against numerous classes of bacteria, including skin bacteria. Side effects may include nausea, diarrhea, gastrointestinal upset, rash, hives, yeast infections, and in rare cases, colitis.
Terbinafine Counseling: Patient counseling regarding adverse effects of terbinafine including but not limited to headache, diarrhea, rash, upset stomach, liver function test abnormalities, itching, taste/smell disturbance, nausea, abdominal pain, and flatulence.  There is a rare possibility of liver failure that can occur when taking terbinafine.  The patient understands that a baseline LFT and kidney function test may be required. The patient verbalized understanding of the proper use and possible adverse effects of terbinafine.  All of the patient's questions and concerns were addressed.
Quinolones Pregnancy And Lactation Text: This medication is Pregnancy Category C and it isn't know if it is safe during pregnancy. It is also excreted in breast milk.
Ivermectin Counseling:  Patient instructed to take medication on an empty stomach with a full glass of water.  Patient informed of potential adverse effects including but not limited to nausea, diarrhea, dizziness, itching, and swelling of the extremities or lymph nodes.  The patient verbalized understanding of the proper use and possible adverse effects of ivermectin.  All of the patient's questions and concerns were addressed.
Rifampin Pregnancy And Lactation Text: This medication is Pregnancy Category C and it isn't know if it is safe during pregnancy. It is also excreted in breast milk and should not be used if you are breast feeding.
Detail Level: Simple
Hydroxyzine Counseling: Patient advised that the medication is sedating and not to drive a car after taking this medication.  Patient informed of potential adverse effects including but not limited to dry mouth, urinary retention, and blurry vision.  The patient verbalized understanding of the proper use and possible adverse effects of hydroxyzine.  All of the patient's questions and concerns were addressed.
Adbry Pregnancy And Lactation Text: It is unknown if this medication will adversely affect pregnancy or breast feeding.
Doxycycline Counseling:  Patient counseled regarding possible photosensitivity and increased risk for sunburn.  Patient instructed to avoid sunlight, if possible.  When exposed to sunlight, patients should wear protective clothing, sunglasses, and sunscreen.  The patient was instructed to call the office immediately if the following severe adverse effects occur:  hearing changes, easy bruising/bleeding, severe headache, or vision changes.  The patient verbalized understanding of the proper use and possible adverse effects of doxycycline.  All of the patient's questions and concerns were addressed.
Griseofulvin Pregnancy And Lactation Text: This medication is Pregnancy Category X and is known to cause serious birth defects. It is unknown if this medication is excreted in breast milk but breast feeding should be avoided.
Erythromycin Counseling:  I discussed with the patient the risks of erythromycin including but not limited to GI upset, allergic reaction, drug rash, diarrhea, increase in liver enzymes, and yeast infections.
Azithromycin Pregnancy And Lactation Text: This medication is considered safe during pregnancy and is also secreted in breast milk.
Metronidazole Counseling:  I discussed with the patient the risks of metronidazole including but not limited to seizures, nausea/vomiting, a metallic taste in the mouth, nausea/vomiting and severe allergy.
Minocycline Counseling: Patient advised regarding possible photosensitivity and discoloration of the teeth, skin, lips, tongue and gums.  Patient instructed to avoid sunlight, if possible.  When exposed to sunlight, patients should wear protective clothing, sunglasses, and sunscreen.  The patient was instructed to call the office immediately if the following severe adverse effects occur:  hearing changes, easy bruising/bleeding, severe headache, or vision changes.  The patient verbalized understanding of the proper use and possible adverse effects of minocycline.  All of the patient's questions and concerns were addressed.
Bactrim Pregnancy And Lactation Text: This medication is Pregnancy Category D and is known to cause fetal risk.  It is also excreted in breast milk.
Ketoconazole Pregnancy And Lactation Text: This medication is Pregnancy Category C and it isn't know if it is safe during pregnancy. It is also excreted in breast milk and breast feeding isn't recommended.
Cimetidine Pregnancy And Lactation Text: This medication is Pregnancy Category B and is considered safe during pregnancy. It is also excreted in breast milk and breast feeding isn't recommended.
Fluconazole Counseling:  Patient counseled regarding adverse effects of fluconazole including but not limited to headache, diarrhea, nausea, upset stomach, liver function test abnormalities, taste disturbance, and stomach pain.  There is a rare possibility of liver failure that can occur when taking fluconazole.  The patient understands that monitoring of LFTs and kidney function test may be required, especially at baseline. The patient verbalized understanding of the proper use and possible adverse effects of fluconazole.  All of the patient's questions and concerns were addressed.
Quinolones Counseling:  I discussed with the patient the risks of fluoroquinolones including but not limited to GI upset, allergic reaction, drug rash, diarrhea, dizziness, photosensitivity, yeast infections, liver function test abnormalities, tendonitis/tendon rupture.
Include Pregnancy/Lactation Warning?: No
Cephalexin Pregnancy And Lactation Text: This medication is Pregnancy Category B and considered safe during pregnancy.  It is also excreted in breast milk but can be used safely for shorter doses.
Doxepin Pregnancy And Lactation Text: This medication is Pregnancy Category C and it isn't known if it is safe during pregnancy. It is also excreted in breast milk and breast feeding isn't recommended.
Bimzelx Counseling:  I discussed with the patient the risks of Bimzelx including but not limited to depression, immunosuppression, allergic reactions and infections.  The patient understands that monitoring is required including a PPD at baseline and must alert us or the primary physician if symptoms of infection or other concerning signs are noted.
Clindamycin Pregnancy And Lactation Text: This medication can be used in pregnancy if certain situations. Clindamycin is also present in breast milk.
Rifampin Counseling: I discussed with the patient the risks of rifampin including but not limited to liver damage, kidney damage, red-orange body fluids, nausea/vomiting and severe allergy.
Adbry Counseling: I discussed with the patient the risks of tralokinumab including but not limited to eye infection and irritation, cold sores, injection site reactions, worsening of asthma, allergic reactions and increased risk of parasitic infection.  Live vaccines should be avoided while taking tralokinumab. The patient understands that monitoring is required and they must alert us or the primary physician if symptoms of infection or other concerning signs are noted.
Sarecycline Counseling: Patient advised regarding possible photosensitivity and discoloration of the teeth, skin, lips, tongue and gums.  Patient instructed to avoid sunlight, if possible.  When exposed to sunlight, patients should wear protective clothing, sunglasses, and sunscreen.  The patient was instructed to call the office immediately if the following severe adverse effects occur:  hearing changes, easy bruising/bleeding, severe headache, or vision changes.  The patient verbalized understanding of the proper use and possible adverse effects of sarecycline.  All of the patient's questions and concerns were addressed.
Griseofulvin Counseling:  I discussed with the patient the risks of griseofulvin including but not limited to photosensitivity, cytopenia, liver damage, nausea/vomiting and severe allergy.  The patient understands that this medication is best absorbed when taken with a fatty meal (e.g., ice cream or french fries).
Hydroxyzine Pregnancy And Lactation Text: This medication is not safe during pregnancy and should not be taken. It is also excreted in breast milk and breast feeding isn't recommended.
Itraconazole Counseling:  I discussed with the patient the risks of itraconazole including but not limited to liver damage, nausea/vomiting, neuropathy, and severe allergy.  The patient understands that this medication is best absorbed when taken with acidic beverages such as non-diet cola or ginger ale.  The patient understands that monitoring is required including baseline LFTs and repeat LFTs at intervals.  The patient understands that they are to contact us or the primary physician if concerning signs are noted.
Azithromycin Counseling:  I discussed with the patient the risks of azithromycin including but not limited to GI upset, allergic reaction, drug rash, diarrhea, and yeast infections.
Doxycycline Pregnancy And Lactation Text: This medication is Pregnancy Category D and not consider safe during pregnancy. It is also excreted in breast milk but is considered safe for shorter treatment courses.
Tetracycline Counseling: Patient counseled regarding possible photosensitivity and increased risk for sunburn.  Patient instructed to avoid sunlight, if possible.  When exposed to sunlight, patients should wear protective clothing, sunglasses, and sunscreen.  The patient was instructed to call the office immediately if the following severe adverse effects occur:  hearing changes, easy bruising/bleeding, severe headache, or vision changes.  The patient verbalized understanding of the proper use and possible adverse effects of tetracycline.  All of the patient's questions and concerns were addressed. Patient understands to avoid pregnancy while on therapy due to potential birth defects.
Bimzelx Pregnancy And Lactation Text: This medication crosses the placenta and the safety is uncertain during pregnancy. It is unknown if this medication is present in breast milk.
Simponi Counseling:  I discussed with the patient the risks of golimumab including but not limited to myelosuppression, immunosuppression, autoimmune hepatitis, demyelinating diseases, lymphoma, and serious infections.  The patient understands that monitoring is required including a PPD at baseline and must alert us or the primary physician if symptoms of infection or other concerning signs are noted.
Erivedge Pregnancy And Lactation Text: This medication is Pregnancy Category X and is absolutely contraindicated during pregnancy. It is unknown if it is excreted in breast milk.
Siliq Counseling:  I discussed with the patient the risks of Siliq including but not limited to new or worsening depression, suicidal thoughts and behavior, immunosuppression, malignancy, posterior leukoencephalopathy syndrome, and serious infections.  The patient understands that monitoring is required including a PPD at baseline and must alert us or the primary physician if symptoms of infection or other concerning signs are noted. There is also a special program designed to monitor depression which is required with Siliq.
Dutasteride Female Counseling: Dutasteride Counseling:  I discussed with the patient the risks of use of dutasteride including but not limited to decreased libido and sexual dysfunction. Explained the teratogenic nature of the medication and stressed the importance of not getting pregnant during treatment. All of the patient's questions and concerns were addressed.
Libtayo Pregnancy And Lactation Text: This medication is contraindicated in pregnancy and when breast feeding.
Enbrel Pregnancy And Lactation Text: This medication is Pregnancy Category B and is considered safe during pregnancy. It is unknown if this medication is excreted in breast milk.
Xolair Pregnancy And Lactation Text: This medication is Pregnancy Category B and is considered safe during pregnancy. This medication is excreted in breast milk.
Dupixent Pregnancy And Lactation Text: This medication likely crosses the placenta but the risk for the fetus is uncertain. This medication is excreted in breast milk.
Rituxan Counseling:  I discussed with the patient the risks of Rituxan infusions. Side effects can include infusion reactions, severe drug rashes including mucocutaneous reactions, reactivation of latent hepatitis and other infections and rarely progressive multifocal leukoencephalopathy.  All of the patient's questions and concerns were addressed.
Taltz Counseling: I discussed with the patient the risks of ixekizumab including but not limited to immunosuppression, serious infections, worsening of inflammatory bowel disease and drug reactions.  The patient understands that monitoring is required including a PPD at baseline and must alert us or the primary physician if symptoms of infection or other concerning signs are noted.
Infliximab Counseling:  I discussed with the patient the risks of infliximab including but not limited to myelosuppression, immunosuppression, autoimmune hepatitis, demyelinating diseases, lymphoma, and serious infections.  The patient understands that monitoring is required including a PPD at baseline and must alert us or the primary physician if symptoms of infection or other concerning signs are noted.
Skyrizi Pregnancy And Lactation Text: The risk during pregnancy and breastfeeding is uncertain with this medication.
Erivedge Counseling- I discussed with the patient the risks of Erivedge including but not limited to nausea, vomiting, diarrhea, constipation, weight loss, changes in the sense of taste, decreased appetite, muscle spasms, and hair loss.  The patient verbalized understanding of the proper use and possible adverse effects of Erivedge.  All of the patient's questions and concerns were addressed.
Ilumya Counseling: I discussed with the patient the risks of tildrakizumab including but not limited to immunosuppression, malignancy, posterior leukoencephalopathy syndrome, and serious infections.  The patient understands that monitoring is required including a PPD at baseline and must alert us or the primary physician if symptoms of infection or other concerning signs are noted.
Tremfya Counseling: I discussed with the patient the risks of guselkumab including but not limited to immunosuppression, serious infections, and drug reactions.  The patient understands that monitoring is required including a PPD at baseline and must alert us or the primary physician if symptoms of infection or other concerning signs are noted.
Libtayo Counseling- I discussed with the patient the risks of Libtayo including but not limited to nausea, vomiting, diarrhea, and bone or muscle pain.  The patient verbalized understanding of the proper use and possible adverse effects of Libtayo.  All of the patient's questions and concerns were addressed.
Cimzia Pregnancy And Lactation Text: This medication crosses the placenta but can be considered safe in certain situations. Cimzia may be excreted in breast milk.
Xolair Counseling:  Patient informed of potential adverse effects including but not limited to fever, muscle aches, rash and allergic reactions.  The patient verbalized understanding of the proper use and possible adverse effects of Xolair.  All of the patient's questions and concerns were addressed.
Dutasteride Male Counseling: Dustasteride Counseling:  I discussed with the patient the risks of use of dutasteride including but not limited to decreased libido, decreased ejaculate volume, and gynecomastia. Women who can become pregnant should not handle medication.  All of the patient's questions and concerns were addressed.
Humira Counseling:  I discussed with the patient the risks of adalimumab including but not limited to myelosuppression, immunosuppression, autoimmune hepatitis, demyelinating diseases, lymphoma, and serious infections.  The patient understands that monitoring is required including a PPD at baseline and must alert us or the primary physician if symptoms of infection or other concerning signs are noted.
Odomzo Counseling- I discussed with the patient the risks of Odomzo including but not limited to nausea, vomiting, diarrhea, constipation, weight loss, changes in the sense of taste, decreased appetite, muscle spasms, and hair loss.  The patient verbalized understanding of the proper use and possible adverse effects of Odomzo.  All of the patient's questions and concerns were addressed.
Enbrel Counseling:  I discussed with the patient the risks of etanercept including but not limited to myelosuppression, immunosuppression, autoimmune hepatitis, demyelinating diseases, lymphoma, and infections.  The patient understands that monitoring is required including a PPD at baseline and must alert us or the primary physician if symptoms of infection or other concerning signs are noted.
Rituxan Pregnancy And Lactation Text: This medication is Pregnancy Category C and it isn't know if it is safe during pregnancy. It is unknown if this medication is excreted in breast milk but similar antibodies are known to be excreted.
Stelara Counseling:  I discussed with the patient the risks of ustekinumab including but not limited to immunosuppression, malignancy, posterior leukoencephalopathy syndrome, and serious infections.  The patient understands that monitoring is required including a PPD at baseline and must alert us or the primary physician if symptoms of infection or other concerning signs are noted.
Dupixent Counseling: I discussed with the patient the risks of dupilumab including but not limited to eye inflammation and irritation, cold sores, injection site reactions, allergic reactions and increased risk of parasitic infection. The patient understands that monitoring is required and they must alert us or the primary physician if symptoms of infection or other concerning signs are noted.
Cosentyx Counseling:  I discussed with the patient the risks of Cosentyx including but not limited to worsening of Crohn's disease, immunosuppression, allergic reactions and infections.  The patient understands that monitoring is required including a PPD at baseline and must alert us or the primary physician if symptoms of infection or other concerning signs are noted.
Skyrizi Counseling: I discussed with the patient the risks of risankizumab-rzaa including but not limited to immunosuppression, and serious infections.  The patient understands that monitoring is required including a PPD at baseline and must alert us or the primary physician if symptoms of infection or other concerning signs are noted.
Cimzia Counseling:  I discussed with the patient the risks of Cimzia including but not limited to immunosuppression, allergic reactions and infections.  The patient understands that monitoring is required including a PPD at baseline and must alert us or the primary physician if symptoms of infection or other concerning signs are noted.
Finasteride Female Counseling: Finasteride Counseling:  I discussed with the patient the risks of use of finasteride including but not limited to decreased libido and sexual dysfunction. Explained the teratogenic nature of the medication and stressed the importance of not getting pregnant during treatment. All of the patient's questions and concerns were addressed.
Finasteride Male Counseling: Finasteride Counseling:  I discussed with the patient the risks of use of finasteride including but not limited to decreased libido, decreased ejaculate volume, gynecomastia, and depression. Women should not handle medication.  All of the patient's questions and concerns were addressed.
Dutasteride Pregnancy And Lactation Text: This medication is absolutely contraindicated in women, especially during pregnancy and breast feeding. Feminization of male fetuses is possible if taking while pregnant.
Cellcept Pregnancy And Lactation Text: This medication is Pregnancy Category D and isn't considered safe during pregnancy. It is unknown if this medication is excreted in breast milk.
Imiquimod Pregnancy And Lactation Text: This medication is Pregnancy Category C. It is unknown if this medication is excreted in breast milk.
Tranexamic Acid Pregnancy And Lactation Text: It is unknown if this medication is safe during pregnancy or breast feeding.
Solaraze Pregnancy And Lactation Text: This medication is Pregnancy Category B and is considered safe. There is some data to suggest avoiding during the third trimester. It is unknown if this medication is excreted in breast milk.
Topical Ketoconazole Counseling: Patient counseled that this medication may cause skin irritation or allergic reactions.  In the event of skin irritation, the patient was advised to reduce the amount of the drug applied or use it less frequently.   The patient verbalized understanding of the proper use and possible adverse effects of ketoconazole.  All of the patient's questions and concerns were addressed.
Wartpeel Pregnancy And Lactation Text: This medication is Pregnancy Category X and contraindicated in pregnancy and in women who may become pregnant. It is unknown if this medication is excreted in breast milk.
Sotyktu Counseling:  I discussed the most common side effects of Sotyktu including: common cold, sore throat, sinus infections, cold sores, canker sores, folliculitis, and acne.  I also discussed more serious side effects of Sotyktu including but not limited to: serious allergic reactions; increased risk for infections such as TB; cancers such as lymphomas; rhabdomyolysis and elevated CPK; and elevated triglycerides and liver enzymes. 
Opioid Counseling: I discussed with the patient the potential side effects of opioids including but not limited to addiction, altered mental status, and depression. I stressed avoiding alcohol, benzodiazepines, muscle relaxants and sleep aids unless specifically okayed by a physician. The patient verbalized understanding of the proper use and possible adverse effects of opioids. All of the patient's questions and concerns were addressed. They were instructed to flush the remaining pills down the toilet if they did not need them for pain.
Opzelura Pregnancy And Lactation Text: There is insufficient data to evaluate drug-associated risk for major birth defects, miscarriage, or other adverse maternal or fetal outcomes.  There is a pregnancy registry that monitors pregnancy outcomes in pregnant persons exposed to the medication during pregnancy.  It is unknown if this medication is excreted in breast milk.  Do not breastfeed during treatment and for about 4 weeks after the last dose.
Prednisone Pregnancy And Lactation Text: This medication is Pregnancy Category C and it isn't know if it is safe during pregnancy. This medication is excreted in breast milk.
Dapsone Pregnancy And Lactation Text: This medication is Pregnancy Category C and is not considered safe during pregnancy or breast feeding.
Imiquimod Counseling:  I discussed with the patient the risks of imiquimod including but not limited to erythema, scaling, itching, weeping, crusting, and pain.  Patient understands that the inflammatory response to imiquimod is variable from person to person and was educated regarded proper titration schedule.  If flu-like symptoms develop, patient knows to discontinue the medication and contact us.
Tranexamic Acid Counseling:  Patient advised of the small risk of bleeding problems with tranexamic acid. They were also instructed to call if they developed any nausea, vomiting or diarrhea. All of the patient's questions and concerns were addressed.
Soolantra Counseling: I discussed with the patients the risks of topial Soolantra. This is a medicine which decreases the number of mites and inflammation in the skin. You experience burning, stinging, eye irritation or allergic reactions.  Please call our office if you develop any problems from using this medication.
Niacinamide Counseling: I recommended taking niacin or niacinamide, also know as vitamin B3, twice daily. Recent evidence suggests that taking vitamin B3 (500 mg twice daily) can reduce the risk of actinic keratoses and non-melanoma skin cancers. Side effects of vitamin B3 include flushing and headache.
Otezla Pregnancy And Lactation Text: This medication is Pregnancy Category C and it isn't known if it is safe during pregnancy. It is unknown if it is excreted in breast milk.
Cellcept Counseling:  I discussed with the patient the risks of mycophenolate mofetil including but not limited to infection/immunosuppression, GI upset, hypokalemia, hypercholesterolemia, bone marrow suppression, lymphoproliferative disorders, malignancy, GI ulceration/bleed/perforation, colitis, interstitial lung disease, kidney failure, progressive multifocal leukoencephalopathy, and birth defects.  The patient understands that monitoring is required including a baseline creatinine and regular CBC testing. In addition, patient must alert us immediately if symptoms of infection or other concerning signs are noted.
Topical Ketoconazole Pregnancy And Lactation Text: This medication is Pregnancy Category B and is considered safe during pregnancy. It is unknown if it is excreted in breast milk.
Winlevi Counseling:  I discussed with the patient the risks of topical clascoterone including but not limited to erythema, scaling, itching, and stinging. Patient voiced their understanding.
Isotretinoin Counseling: Patient should get monthly blood tests, not donate blood, not drive at night if vision affected, not share medication, and not undergo elective surgery for 6 months after tx completed. Side effects reviewed, pt to contact office should one occur.
Rinvoq Pregnancy And Lactation Text: Based on animal studies, Rinvoq may cause embryo-fetal harm when administered to pregnant women.  The medication should not be used in pregnancy.  Breastfeeding is not recommended during treatment and for 6 days after the last dose.
Benzoyl Peroxide Counseling: Patient counseled that medicine may cause skin irritation and bleach clothing.  In the event of skin irritation, the patient was advised to reduce the amount of the drug applied or use it less frequently.   The patient verbalized understanding of the proper use and possible adverse effects of benzoyl peroxide.  All of the patient's questions and concerns were addressed.
Opioid Pregnancy And Lactation Text: These medications can lead to premature delivery and should be avoided during pregnancy. These medications are also present in breast milk in small amounts.
Opzelura Counseling:  I discussed with the patient the risks of Opzelura including but not limited to nasopharngitis, bronchitis, ear infection, eosinophila, hives, diarrhea, folliculitis, tonsillitis, and rhinorrhea.  Taken orally, this medication has been linked to serious infections; higher rate of mortality; malignancy and lymphoproliferative disorders; major adverse cardiovascular events; thrombosis; thrombocytopenia, anemia, and neutropenia; and lipid elevations.
Hydroquinone Pregnancy And Lactation Text: This medication has not been assigned a Pregnancy Risk Category but animal studies failed to show danger with the topical medication. It is unknown if the medication is excreted in breast milk.
Low Dose Naltrexone Pregnancy And Lactation Text: Naltrexone is pregnancy category C.  There have been no adequate and well-controlled studies in pregnant women.  It should be used in pregnancy only if the potential benefit justifies the potential risk to the fetus.   Limited data indicates that naltrexone is minimally excreted into breastmilk.
5-Fu Counseling: 5-Fluorouracil Counseling:  I discussed with the patient the risks of 5-fluorouracil including but not limited to erythema, scaling, itching, weeping, crusting, and pain.
Otezla Counseling: The side effects of Otezla were discussed with the patient, including but not limited to worsening or new depression, weight loss, diarrhea, nausea, upper respiratory tract infection, and headache. Patient instructed to call the office should any adverse effect occur.  The patient verbalized understanding of the proper use and possible adverse effects of Otezla.  All the patient's questions and concerns were addressed.
Bexarotene Pregnancy And Lactation Text: This medication is Pregnancy Category X and should not be given to women who are pregnant or may become pregnant. This medication should not be used if you are breast feeding.
Prednisone Counseling:  I discussed with the patient the risks of prolonged use of prednisone including but not limited to weight gain, insomnia, osteoporosis, mood changes, diabetes, susceptibility to infection, glaucoma and high blood pressure.  In cases where prednisone use is prolonged, patients should be monitored with blood pressure checks, serum glucose levels and an eye exam.  Additionally, the patient may need to be placed on GI prophylaxis, PCP prophylaxis, and calcium and vitamin D supplementation and/or a bisphosphonate.  The patient verbalized understanding of the proper use and the possible adverse effects of prednisone.  All of the patient's questions and concerns were addressed.
Dapsone Counseling: I discussed with the patient the risks of dapsone including but not limited to hemolytic anemia, agranulocytosis, rashes, methemoglobinemia, kidney failure, peripheral neuropathy, headaches, GI upset, and liver toxicity.  Patients who start dapsone require monitoring including baseline LFTs and weekly CBCs for the first month, then every month thereafter.  The patient verbalized understanding of the proper use and possible adverse effects of dapsone.  All of the patient's questions and concerns were addressed.
Soolantra Pregnancy And Lactation Text: This medication is Pregnancy Category C. This medication is considered safe during breast feeding.
Topical Metronidazole Counseling: Metronidazole is a topical antibiotic medication. You may experience burning, stinging, redness, or allergic reactions.  Please call our office if you develop any problems from using this medication.
Winlevi Pregnancy And Lactation Text: This medication is considered safe during pregnancy and breastfeeding.
Cantharidin Pregnancy And Lactation Text: This medication has not been proven safe during pregnancy. It is unknown if this medication is excreted in breast milk.
Azelaic Acid Pregnancy And Lactation Text: This medication is considered safe during pregnancy and breast feeding.
Rinvoq Counseling: I discussed with the patient the risks of Rinvoq therapy including but not limited to upper respiratory tract infections, shingles, cold sores, bronchitis, nausea, cough, fever, acne, and headache. Live vaccines should be avoided.  This medication has been linked to serious infections; higher rate of mortality; malignancy and lymphoproliferative disorders; major adverse cardiovascular events; thrombosis; thrombocytopenia, anemia, and neutropenia; lipid elevations; liver enzyme elevations; and gastrointestinal perforations.
Hydroquinone Counseling:  Patient advised that medication may result in skin irritation, lightening (hypopigmentation), dryness, and burning.  In the event of skin irritation, the patient was advised to reduce the amount of the drug applied or use it less frequently.  Rarely, spots that are treated with hydroquinone can become darker (pseudoochronosis).  Should this occur, patient instructed to stop medication and call the office. The patient verbalized understanding of the proper use and possible adverse effects of hydroquinone.  All of the patient's questions and concerns were addressed.
Oral Minoxidil Pregnancy And Lactation Text: This medication should only be used when clearly needed if you are pregnant, attempting to become pregnant or breast feeding.
Colchicine Pregnancy And Lactation Text: This medication is Pregnancy Category C and isn't considered safe during pregnancy. It is excreted in breast milk.
Bexarotene Counseling:  I discussed with the patient the risks of bexarotene including but not limited to hair loss, dry lips/skin/eyes, liver abnormalities, hyperlipidemia, pancreatitis, depression/suicidal ideation, photosensitivity, drug rash/allergic reactions, hypothyroidism, anemia, leukopenia, infection, cataracts, and teratogenicity.  Patient understands that they will need regular blood tests to check lipid profile, liver function tests, white blood cell count, thyroid function tests and pregnancy test if applicable.
Low Dose Naltrexone Counseling- I discussed with the patient the potential risks and side effects of low dose naltrexone including but not limited to: more vivid dreams, headaches, nausea, vomiting, abdominal pain, fatigue, dizziness, and anxiety.
Rhofade Counseling: Rhofade is a topical medication which can decrease superficial blood flow where applied. Side effects are uncommon and include stinging, redness and allergic reactions.
Mirvaso Pregnancy And Lactation Text: This medication has not been assigned a Pregnancy Risk Category. It is unknown if the medication is excreted in breast milk.
Azathioprine Counseling:  I discussed with the patient the risks of azathioprine including but not limited to myelosuppression, immunosuppression, hepatotoxicity, lymphoma, and infections.  The patient understands that monitoring is required including baseline LFTs, Creatinine, possible TPMP genotyping and weekly CBCs for the first month and then every 2 weeks thereafter.  The patient verbalized understanding of the proper use and possible adverse effects of azathioprine.  All of the patient's questions and concerns were addressed.
Cantharidin Counseling:  I discussed with the patient the risks of Cantharidin including but not limited to pain, redness, burning, itching, and blistering.
Topical Retinoid counseling:  Patient advised to apply a pea-sized amount only at bedtime and wait 30 minutes after washing their face before applying.  If too drying, patient may add a non-comedogenic moisturizer. The patient verbalized understanding of the proper use and possible adverse effects of retinoids.  All of the patient's questions and concerns were addressed.
Thalidomide Counseling: I discussed with the patient the risks of thalidomide including but not limited to birth defects, anxiety, weakness, chest pain, dizziness, cough and severe allergy.
Methotrexate Pregnancy And Lactation Text: This medication is Pregnancy Category X and is known to cause fetal harm. This medication is excreted in breast milk.
Topical Metronidazole Pregnancy And Lactation Text: This medication is Pregnancy Category B and considered safe during pregnancy.  It is also considered safe to use while breastfeeding.
VTAMA Counseling: I discussed with the patient that VTAMA is not for use in the eyes, mouth or mouth. They should call the office if they develop any signs of allergic reactions to VTAMA. The patient verbalized understanding of the proper use and possible adverse effects of VTAMA.  All of the patient's questions and concerns were addressed.
Azelaic Acid Counseling: Patient counseled that medicine may cause skin irritation and to avoid applying near the eyes.  In the event of skin irritation, the patient was advised to reduce the amount of the drug applied or use it less frequently.   The patient verbalized understanding of the proper use and possible adverse effects of azelaic acid.  All of the patient's questions and concerns were addressed.
Olumiant Pregnancy And Lactation Text: Based on animal studies, Olumiant may cause embryo-fetal harm when administered to pregnant women.  The medication should not be used in pregnancy.  Breastfeeding is not recommended during treatment.
Calcipotriene Pregnancy And Lactation Text: The use of this medication during pregnancy or lactation is not recommended as there is insufficient data.
Qbrexza Pregnancy And Lactation Text: There is no available data on Qbrexza use in pregnant women.  There is no available data on Qbrexza use in lactation.
Acitretin Pregnancy And Lactation Text: This medication is Pregnancy Category X and should not be given to women who are pregnant or may become pregnant in the future. This medication is excreted in breast milk.
Mirvaso Counseling: Mirvaso is a topical medication which can decrease superficial blood flow where applied. Side effects are uncommon and include stinging, redness and allergic reactions.
Oral Minoxidil Counseling- I discussed with the patient the risks of oral minoxidil including but not limited to shortness of breath, swelling of the feet or ankles, dizziness, lightheadedness, unwanted hair growth and allergic reaction.  The patient verbalized understanding of the proper use and possible adverse effects of oral minoxidil.  All of the patient's questions and concerns were addressed.
Sski Pregnancy And Lactation Text: This medication is Pregnancy Category D and isn't considered safe during pregnancy. It is excreted in breast milk.
Methotrexate Counseling:  Patient counseled regarding adverse effects of methotrexate including but not limited to nausea, vomiting, abnormalities in liver function tests. Patients may develop mouth sores, rash, diarrhea, and abnormalities in blood counts. The patient understands that monitoring is required including LFT's and blood counts.  There is a rare possibility of scarring of the liver and lung problems that can occur when taking methotrexate. Persistent nausea, loss of appetite, pale stools, dark urine, cough, and shortness of breath should be reported immediately. Patient advised to discontinue methotrexate treatment at least three months before attempting to become pregnant.  I discussed the need for folate supplements while taking methotrexate.  These supplements can decrease side effects during methotrexate treatment. The patient verbalized understanding of the proper use and possible adverse effects of methotrexate.  All of the patient's questions and concerns were addressed.
Calcipotriene Counseling:  I discussed with the patient the risks of calcipotriene including but not limited to erythema, scaling, itching, and irritation.
Hydroxychloroquine Pregnancy And Lactation Text: This medication has been shown to cause fetal harm but it isn't assigned a Pregnancy Risk Category. There are small amounts excreted in breast milk.
Topical Steroids Counseling: I discussed with the patient that prolonged use of topical steroids can result in the increased appearance of superficial blood vessels (telangiectasias), lightening (hypopigmentation) and thinning of the skin (atrophy).  Patient understands to avoid using high potency steroids in skin folds, the groin or the face.  The patient verbalized understanding of the proper use and possible adverse effects of topical steroids.  All of the patient's questions and concerns were addressed.
Vtama Pregnancy And Lactation Text: It is unknown if this medication can cause problems during pregnancy and breastfeeding.
Colchicine Counseling:  Patient counseled regarding adverse effects including but not limited to stomach upset (nausea, vomiting, stomach pain, or diarrhea).  Patient instructed to limit alcohol consumption while taking this medication.  Colchicine may reduce blood counts especially with prolonged use.  The patient understands that monitoring of kidney function and blood counts may be required, especially at baseline. The patient verbalized understanding of the proper use and possible adverse effects of colchicine.  All of the patient's questions and concerns were addressed.
Olumiant Counseling: I discussed with the patient the risks of Olumiant therapy including but not limited to upper respiratory tract infections, shingles, cold sores, and nausea. Live vaccines should be avoided.  This medication has been linked to serious infections; higher rate of mortality; malignancy and lymphoproliferative disorders; major adverse cardiovascular events; thrombosis; gastrointestinal perforations; neutropenia; lymphopenia; anemia; liver enzyme elevations; and lipid elevations.
Spironolactone Pregnancy And Lactation Text: This medication can cause feminization of the male fetus and should be avoided during pregnancy. The active metabolite is also found in breast milk.
Aklief Pregnancy And Lactation Text: It is unknown if this medication is safe to use during pregnancy.  It is unknown if this medication is excreted in breast milk.  Breastfeeding women should use the topical cream on the smallest area of the skin for the shortest time needed while breastfeeding.  Do not apply to nipple and areola.
SSKI Counseling:  I discussed with the patient the risks of SSKI including but not limited to thyroid abnormalities, metallic taste, GI upset, fever, headache, acne, arthralgias, paraesthesias, lymphadenopathy, easy bleeding, arrhythmias, and allergic reaction.
Eucrisa Counseling: Patient may experience a mild burning sensation during topical application. Eucrisa is not approved in children less than 3 months of age.
Hydroxychloroquine Counseling:  I discussed with the patient that a baseline ophthalmologic exam is needed at the start of therapy and every year thereafter while on therapy. A CBC may also be warranted for monitoring.  The side effects of this medication were discussed with the patient, including but not limited to agranulocytosis, aplastic anemia, seizures, rashes, retinopathy, and liver toxicity. Patient instructed to call the office should any adverse effect occur.  The patient verbalized understanding of the proper use and possible adverse effects of Plaquenil.  All the patient's questions and concerns were addressed.
Olanzapine Pregnancy And Lactation Text: This medication is pregnancy category C.   There are no adequate and well controlled trials with olanzapine in pregnant females.  Olanzapine should be used during pregnancy only if the potential benefit justifies the potential risk to the fetus.   In a study in lactating healthy women, olanzapine was excreted in breast milk.  It is recommended that women taking olanzapine should not breast feed.
Acitretin Counseling:  I discussed with the patient the risks of acitretin including but not limited to hair loss, dry lips/skin/eyes, liver damage, hyperlipidemia, depression/suicidal ideation, photosensitivity.  Serious rare side effects can include but are not limited to pancreatitis, pseudotumor cerebri, bony changes, clot formation/stroke/heart attack.  Patient understands that alcohol is contraindicated since it can result in liver toxicity and significantly prolong the elimination of the drug by many years.
Qbrexza Counseling:  I discussed with the patient the risks of Qbrexza including but not limited to headache, mydriasis, blurred vision, dry eyes, nasal dryness, dry mouth, dry throat, dry skin, urinary hesitation, and constipation.  Local skin reactions including erythema, burning, stinging, and itching can also occur.
Tazorac Counseling:  Patient advised that medication is irritating and drying.  Patient may need to apply sparingly and wash off after an hour before eventually leaving it on overnight.  The patient verbalized understanding of the proper use and possible adverse effects of tazorac.  All of the patient's questions and concerns were addressed.
Topical Steroids Applications Pregnancy And Lactation Text: Most topical steroids are considered safe to use during pregnancy and lactation.  Any topical steroid applied to the breast or nipple should be washed off before breastfeeding.
Zoryve Counseling:  I discussed with the patient that Zoryve is not for use in the eyes, mouth or vagina. The most commonly reported side effects include diarrhea, headache, insomnia, application site pain, upper respiratory tract infections, and urinary tract infections.  All of the patient's questions and concerns were addressed.
Aklief counseling:  Patient advised to apply a pea-sized amount only at bedtime and wait 30 minutes after washing their face before applying.  If too drying, patient may add a non-comedogenic moisturizer.  The most commonly reported side effects including irritation, redness, scaling, dryness, stinging, burning, itching, and increased risk of sunburn.  The patient verbalized understanding of the proper use and possible adverse effects of retinoids.  All of the patient's questions and concerns were addressed.
Litfulo Pregnancy And Lactation Text: Based on animal studies, Lifulo may cause embryo-fetal harm when administered to pregnant women.  The medication should not be used in pregnancy.  Breastfeeding is not recommended during treatment.
Spironolactone Counseling: Patient advised regarding risks of diarrhea, abdominal pain, hyperkalemia, birth defects (for female patients), liver toxicity and renal toxicity. The patient may need blood work to monitor liver and kidney function and potassium levels while on therapy. The patient verbalized understanding of the proper use and possible adverse effects of spironolactone.  All of the patient's questions and concerns were addressed.
Propranolol Pregnancy And Lactation Text: This medication is Pregnancy Category C and it isn't known if it is safe during pregnancy. It is excreted in breast milk.
Olanzapine Counseling- I discussed with the patient the common side effects of olanzapine including but are not limited to: lack of energy, dry mouth, increased appetite, sleepiness, tremor, constipation, dizziness, changes in behavior, or restlessness.  Explained that teenagers are more likely to experience headaches, abdominal pain, pain in the arms or legs, tiredness, and sleepiness.  Serious side effects include but are not limited: increased risk of death in elderly patients who are confused, have memory loss, or dementia-related psychosis; hyperglycemia; increased cholesterol and triglycerides; and weight gain.
Protopic Pregnancy And Lactation Text: This medication is Pregnancy Category C. It is unknown if this medication is excreted in breast milk when applied topically.
Glycopyrrolate Pregnancy And Lactation Text: This medication is Pregnancy Category B and is considered safe during pregnancy. It is unknown if it is excreted breast milk.
Minoxidil Counseling: Minoxidil is a topical medication which can increase blood flow where it is applied. It is uncertain how this medication increases hair growth. Side effects are uncommon and include stinging and allergic reactions.
Clofazimine Counseling:  I discussed with the patient the risks of clofazimine including but not limited to skin and eye pigmentation, liver damage, nausea/vomiting, gastrointestinal bleeding and allergy.
Tazorac Pregnancy And Lactation Text: This medication is not safe during pregnancy. It is unknown if this medication is excreted in breast milk.
Cyclosporine Counseling:  I discussed with the patient the risks of cyclosporine including but not limited to hypertension, gingival hyperplasia,myelosuppression, immunosuppression, liver damage, kidney damage, neurotoxicity, lymphoma, and serious infections. The patient understands that monitoring is required including baseline blood pressure, CBC, CMP, lipid panel and uric acid, and then 1-2 times monthly CMP and blood pressure.
Carac Counseling:  I discussed with the patient the risks of Carac including but not limited to erythema, scaling, itching, weeping, crusting, and pain.
Topical Sulfur Applications Counseling: Topical Sulfur Counseling: Patient counseled that this medication may cause skin irritation or allergic reactions.  In the event of skin irritation, the patient was advised to reduce the amount of the drug applied or use it less frequently.   The patient verbalized understanding of the proper use and possible adverse effects of topical sulfur application.  All of the patient's questions and concerns were addressed.
Xelaernz Pregnancy And Lactation Text: This medication is Pregnancy Category D and is not considered safe during pregnancy.  The risk during breast feeding is also uncertain.
Litfulo Counseling: I discussed with the patient the risks of Litfulo therapy including but not limited to upper respiratory tract infections, shingles, cold sores, and nausea. Live vaccines should be avoided.  This medication has been linked to serious infections; higher rate of mortality; malignancy and lymphoproliferative disorders; major adverse cardiovascular events; thrombosis; gastrointestinal perforations; neutropenia; lymphopenia; anemia; liver enzyme elevations; and lipid elevations.
Birth Control Pills Pregnancy And Lactation Text: This medication should be avoided if pregnant and for the first 30 days post-partum.
Protopic Counseling: Patient may experience a mild burning sensation during topical application. Protopic is not approved in children less than 2 years of age. There have been case reports of hematologic and skin malignancies in patients using topical calcineurin inhibitors although causality is questionable.
Valtrex Pregnancy And Lactation Text: this medication is Pregnancy Category B and is considered safe during pregnancy. This medication is not directly found in breast milk but it's metabolite acyclovir is present.
Klisyri Pregnancy And Lactation Text: It is unknown if this medication can harm a developing fetus or if it is excreted in breast milk.
Elidel Counseling: Patient may experience a mild burning sensation during topical application. Elidel is not approved in children less than 2 years of age. There have been case reports of hematologic and skin malignancies in patients using topical calcineurin inhibitors although causality is questionable.
Nsaids Pregnancy And Lactation Text: These medications are considered safe up to 30 weeks gestation. It is excreted in breast milk.
Cyclophosphamide Pregnancy And Lactation Text: This medication is Pregnancy Category D and it isn't considered safe during pregnancy. This medication is excreted in breast milk.
Benzoyl Peroxide Pregnancy And Lactation Text: This medication is Pregnancy Category C. It is unknown if benzoyl peroxide is excreted in breast milk.
Topical Clindamycin Counseling: Patient counseled that this medication may cause skin irritation or allergic reactions.  In the event of skin irritation, the patient was advised to reduce the amount of the drug applied or use it less frequently.   The patient verbalized understanding of the proper use and possible adverse effects of clindamycin.  All of the patient's questions and concerns were addressed.
Propranolol Counseling:  I discussed with the patient the risks of propranolol including but not limited to low heart rate, low blood pressure, low blood sugar, restlessness and increased cold sensitivity. They should call the office if they experience any of these side effects.
Topical Sulfur Applications Pregnancy And Lactation Text: This medication is considered safe during pregnancy and breast feeding secondary to limited systemic absorption.
High Dose Vitamin A Pregnancy And Lactation Text: High dose vitamin A therapy is contraindicated during pregnancy and breast feeding.
Zyclara Counseling:  I discussed with the patient the risks of imiquimod including but not limited to erythema, scaling, itching, weeping, crusting, and pain.  Patient understands that the inflammatory response to imiquimod is variable from person to person and was educated regarded proper titration schedule.  If flu-like symptoms develop, patient knows to discontinue the medication and contact us.
Glycopyrrolate Counseling:  I discussed with the patient the risks of glycopyrrolate including but not limited to skin rash, drowsiness, dry mouth, difficulty urinating, and blurred vision.
Cibinqo Pregnancy And Lactation Text: It is unknown if this medication will adversely affect pregnancy or breast feeding.  You should not take this medication if you are currently pregnant or planning a pregnancy or while breastfeeding.
Birth Control Pills Counseling: Birth Control Pill Counseling: I discussed with the patient the potential side effects of OCPs including but not limited to increased risk of stroke, heart attack, thrombophlebitis, deep venous thrombosis, hepatic adenomas, breast changes, GI upset, headaches, and depression.  The patient verbalized understanding of the proper use and possible adverse effects of OCPs. All of the patient's questions and concerns were addressed.
Xeljanz Counseling: I discussed with the patient the risks of Xeljanz therapy including increased risk of infection, liver issues, headache, diarrhea, or cold symptoms. Live vaccines should be avoided. They were instructed to call if they have any problems.
Valtrex Counseling: I discussed with the patient the risks of valacyclovir including but not limited to kidney damage, nausea, vomiting and severe allergy.  The patient understands that if the infection seems to be worsening or is not improving, they are to call.
Klisyri Counseling:  I discussed with the patient the risks of Klisyri including but not limited to erythema, scaling, itching, weeping, crusting, and pain.
Cyclophosphamide Counseling:  I discussed with the patient the risks of cyclophosphamide including but not limited to hair loss, hormonal abnormalities, decreased fertility, abdominal pain, diarrhea, nausea and vomiting, bone marrow suppression and infection. The patient understands that monitoring is required while taking this medication.
High Dose Vitamin A Counseling: Side effects reviewed, pt to contact office should one occur.
Nsaids Counseling: NSAID Counseling: I discussed with the patient that NSAIDs should be taken with food. Prolonged use of NSAIDs can result in the development of stomach ulcers.  Patient advised to stop taking NSAIDs if abdominal pain occurs.  The patient verbalized understanding of the proper use and possible adverse effects of NSAIDs.  All of the patient's questions and concerns were addressed.
Arava Counseling:  Patient counseled regarding adverse effects of Arava including but not limited to nausea, vomiting, abnormalities in liver function tests. Patients may develop mouth sores, rash, diarrhea, and abnormalities in blood counts. The patient understands that monitoring is required including LFTs and blood counts.  There is a rare possibility of scarring of the liver and lung problems that can occur when taking methotrexate. Persistent nausea, loss of appetite, pale stools, dark urine, cough, and shortness of breath should be reported immediately. Patient advised to discontinue Arava treatment and consult with a physician prior to attempting conception. The patient will have to undergo a treatment to eliminate Arava from the body prior to conception.
Solaraze Counseling:  I discussed with the patient the risks of Solaraze including but not limited to erythema, scaling, itching, weeping, crusting, and pain.
Wartpeel Counseling:  I discussed with the patient the risks of Wartpeel including but not limited to erythema, scaling, itching, weeping, crusting, and pain.
Cibinqo Counseling: I discussed with the patient the risks of Cibinqo therapy including but not limited to common cold, nausea, headache, cold sores, increased blood CPK levels, dizziness, UTIs, fatigue, acne, and vomitting. Live vaccines should be avoided.  This medication has been linked to serious infections; higher rate of mortality; malignancy and lymphoproliferative disorders; major adverse cardiovascular events; thrombosis; thrombocytopenia and lymphopenia; lipid elevations; and retinal detachment.
Sotyktu Pregnancy And Lactation Text: There is insufficient data to evaluate whether or not Sotyktu is safe to use during pregnancy.   It is not known if Sotyktu passes into breast milk and whether or not it is safe to use when breastfeeding.  
Finasteride Pregnancy And Lactation Text: This medication is absolutely contraindicated during pregnancy. It is unknown if it is excreted in breast milk.
Oxybutynin Counseling:  I discussed with the patient the risks of oxybutynin including but not limited to skin rash, drowsiness, dry mouth, difficulty urinating, and blurred vision.
Drysol Counseling:  I discussed with the patient the risks of drysol/aluminum chloride including but not limited to skin rash, itching, irritation, burning.
Niacinamide Pregnancy And Lactation Text: These medications are considered safe during pregnancy.
Isotretinoin Pregnancy And Lactation Text: This medication is Pregnancy Category X and is considered extremely dangerous during pregnancy. It is unknown if it is excreted in breast milk.
Picato Counseling:  I discussed with the patient the risks of Picato including but not limited to erythema, scaling, itching, weeping, crusting, and pain.
Gabapentin Counseling: I discussed with the patient the risks of gabapentin including but not limited to dizziness, somnolence, fatigue and ataxia.

## 2024-01-18 ENCOUNTER — RX ONLY (OUTPATIENT)
Age: 89
Setting detail: RX ONLY
End: 2024-01-18

## 2024-01-18 ENCOUNTER — APPOINTMENT (RX ONLY)
Dept: URBAN - METROPOLITAN AREA CLINIC 15 | Facility: CLINIC | Age: 89
Setting detail: DERMATOLOGY
End: 2024-01-18

## 2024-01-18 DIAGNOSIS — L71.8 OTHER ROSACEA: ICD-10-CM

## 2024-01-18 DIAGNOSIS — R21 RASH AND OTHER NONSPECIFIC SKIN ERUPTION: ICD-10-CM

## 2024-01-18 LAB
FUNGUS SPEC FUNGUS STN: NORMAL
GRAM STN SPEC: NORMAL
SIGNIFICANT IND 70042: NORMAL
SIGNIFICANT IND 70042: NORMAL
SITE SITE: NORMAL
SITE SITE: NORMAL
SOURCE SOURCE: NORMAL
SOURCE SOURCE: NORMAL

## 2024-01-18 PROCEDURE — ? PRESCRIPTION MEDICATION MANAGEMENT

## 2024-01-18 PROCEDURE — ? COUNSELING

## 2024-01-18 PROCEDURE — ? ADDITIONAL NOTES

## 2024-01-18 RX ORDER — FLUCONAZOLE 200 MG/1
TABLET ORAL
Qty: 14 | Refills: 0 | Status: ERX | COMMUNITY
Start: 2024-01-18

## 2024-01-18 ASSESSMENT — LOCATION SIMPLE DESCRIPTION DERM
LOCATION SIMPLE: RIGHT CHEEK
LOCATION SIMPLE: LEFT CHEEK

## 2024-01-18 ASSESSMENT — LOCATION ZONE DERM: LOCATION ZONE: FACE

## 2024-01-18 ASSESSMENT — LOCATION DETAILED DESCRIPTION DERM
LOCATION DETAILED: LEFT CENTRAL MALAR CHEEK
LOCATION DETAILED: RIGHT CENTRAL MALAR CHEEK

## 2024-01-18 NOTE — PROCEDURE: PRESCRIPTION MEDICATION MANAGEMENT
Initiate Treatment: Oral
Plan: We will call patient in 1 weeks, if he’s not improved consider treating demodex
Render In Strict Bullet Format?: No
Detail Level: Zone

## 2024-01-18 NOTE — PROCEDURE: ADDITIONAL NOTES
Detail Level: Simple
Additional Notes: Cultured- sending to [facility]
Render Risk Assessment In Note?: no

## 2024-01-22 ENCOUNTER — RX ONLY (OUTPATIENT)
Age: 89
Setting detail: RX ONLY
End: 2024-01-22

## 2024-01-22 RX ORDER — CIPROFLOXACIN 500 MG/1
TABLET, FILM COATED ORAL
Qty: 20 | Refills: 0 | Status: ERX | COMMUNITY
Start: 2024-01-22

## 2024-01-31 ENCOUNTER — APPOINTMENT (RX ONLY)
Dept: URBAN - METROPOLITAN AREA CLINIC 15 | Facility: CLINIC | Age: 89
Setting detail: DERMATOLOGY
End: 2024-01-31

## 2024-01-31 DIAGNOSIS — R21 RASH AND OTHER NONSPECIFIC SKIN ERUPTION: ICD-10-CM

## 2024-01-31 PROCEDURE — ? BIOPSY BY PUNCH METHOD

## 2024-01-31 PROCEDURE — ? COUNSELING

## 2024-01-31 PROCEDURE — ? BIOPSY BY PUNCH METHOD FOR DIF

## 2024-01-31 PROCEDURE — ? ORDER TESTS

## 2024-01-31 PROCEDURE — 11105 PUNCH BX SKIN EA SEP/ADDL: CPT

## 2024-01-31 PROCEDURE — ? INTRAMUSCULAR KENALOG

## 2024-01-31 PROCEDURE — 11104 PUNCH BX SKIN SINGLE LESION: CPT

## 2024-01-31 PROCEDURE — ? PRESCRIPTION

## 2024-01-31 PROCEDURE — ? PRESCRIPTION MEDICATION MANAGEMENT

## 2024-01-31 PROCEDURE — 99214 OFFICE O/P EST MOD 30 MIN: CPT | Mod: 25

## 2024-01-31 PROCEDURE — ? SEPARATE AND IDENTIFIABLE DOCUMENTATION

## 2024-01-31 PROCEDURE — 96372 THER/PROPH/DIAG INJ SC/IM: CPT | Mod: 59

## 2024-01-31 PROCEDURE — ? ADDITIONAL NOTES

## 2024-01-31 RX ORDER — TRIAMCINOLONE ACETONIDE 1 MG/G
OINTMENT TOPICAL BID
Qty: 454 | Refills: 1 | Status: ERX | COMMUNITY
Start: 2024-01-31

## 2024-01-31 RX ADMIN — TRIAMCINOLONE ACETONIDE: 1 OINTMENT TOPICAL at 00:00

## 2024-01-31 ASSESSMENT — LOCATION ZONE DERM: LOCATION ZONE: TRUNK

## 2024-01-31 ASSESSMENT — LOCATION SIMPLE DESCRIPTION DERM
LOCATION SIMPLE: RIGHT UPPER BACK
LOCATION SIMPLE: RIGHT BUTTOCK
LOCATION SIMPLE: CHEST

## 2024-01-31 ASSESSMENT — LOCATION DETAILED DESCRIPTION DERM
LOCATION DETAILED: STERNUM
LOCATION DETAILED: LEFT LATERAL SUPERIOR CHEST
LOCATION DETAILED: RIGHT BUTTOCK
LOCATION DETAILED: RIGHT SUPERIOR MEDIAL UPPER BACK
LOCATION DETAILED: LEFT MEDIAL INFERIOR CHEST

## 2024-01-31 NOTE — PROCEDURE: BIOPSY BY PUNCH METHOD FOR DIF
Detail Level: Detailed
Was A Bandage Applied: Yes
Punch Size In Mm: 4
Size Of Lesion In Cm (Optional): 0
Depth Of Punch Biopsy: dermis
Biopsy Type: DIF (perilesional)
Anesthesia Type: 1% lidocaine with epinephrine
Anesthesia Volume In Cc: 0.5
Hemostasis: None
Epidermal Sutures: 4-0 Chromic Gut
Wound Care: Petrolatum
Dressing: bandage
Patient Will Remove Sutures At Home?: No
Consent: Written consent was obtained and risks were reviewed including but not limited to scarring, infection, bleeding, scabbing, incomplete removal, nerve damage and allergy to anesthesia.
Post-Care Instructions: I reviewed with the patient in detail post-care instructions. Patient is to keep the biopsy site dry overnight, and then apply bacitracin twice daily until healed. Patient may apply hydrogen peroxide soaks to remove any crusting.
Home Suture Removal Text: Patient was provided a home suture removal kit and will remove their sutures at home.  If they have any questions or difficulties they will call the office.
Notification Instructions: Patient will be notified of biopsy results. However, patient instructed to call the office if not contacted within 2 weeks.
Billing Type: Third-Party Bill

## 2024-01-31 NOTE — PROCEDURE: ADDITIONAL NOTES
Additional Notes: 1.31.24- Patient has gotten worse/ skin is extremely inflamed on back, chest and arms at todays visit. Not doing any orals at this time and has just been using cerve-\\n\\nrepeat bx & labs. see ddx above. he is hemodynatmically stable; ROS reassuring. no mucosal lesions.\\n\\nchronic polymorphous eruption on his chest, back, shoulders; somewhat subtle most of the time but now severe & bullous (unclear if bullae are just 2/2 severe infl or actually part of underlying process); worsened shortely after starting fluoroquinolone. 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 DIF neg, cbc, cmp, esr/crp, tsh WNL. some imp of itching w/t op steroids but no improvement w/ po pred, doxy, nbuvb, antihistamines (higher doses), po -azole.
Render Risk Assessment In Note?: no
Detail Level: Simple
Billing Type: Third-Party Bill
Expected Date Of Service: 01/05/2024

## 2024-01-31 NOTE — PROCEDURE: INTRAMUSCULAR KENALOG
Consent: The risks of atrophy were reviewed with the patient.
Concentration (Mg/Ml) Of Additional Medication: 2.5
Add Option For Additional Mediation: No
Kenalog Preparation: kenalog
Administered By (Optional): SAULO
Expiration Date (Optional): 11.2024
Detail Level: None
Total Volume (Ccs): 1.5
Concentration (Mg/Ml): 40.0
Lot # (Optional): [id number]

## 2024-01-31 NOTE — PROCEDURE: ORDER TESTS
Lab Facility: 0
Billing Type: Third-Party Bill
Bill For Surgical Tray: no
Expected Date Of Service: 01/31/2024

## 2024-01-31 NOTE — PROCEDURE: BIOPSY BY PUNCH METHOD
Detail Level: Detailed
Was A Bandage Applied: Yes
Punch Size In Mm: 4
Size Of Lesion In Cm (Optional): 0
Depth Of Punch Biopsy: dermis
Biopsy Type: H and E
Anesthesia Type: 1% lidocaine with epinephrine
Anesthesia Volume In Cc: 0.5
Hemostasis: Wound Closure
Epidermal Sutures: 4-0 Chromic Gut
Wound Care: Petrolatum
Dressing: bandage
Patient Will Remove Sutures At Home?: No
Lab: [id number]
Lab Facility: 
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 DIF neg, cbc, cmp, esr/crp, tsh WNL. some imp of itching w/t op steroids but no improvement w/ po pred, doxy, nbuvb, antihistamines (higher doses), po -azole.
Consent: Written consent was obtained and risks were reviewed including but not limited to scarring, infection, bleeding, scabbing, incomplete removal, nerve damage and allergy to anesthesia.
Post-Care Instructions: Reviewed with patient the post-care instructions. Patient is to keep the biopsy site covered until tomorrow at which time they will remove the bandage, wash with gentle soap & water, then apply liberal amount of petroleum jelly and again cover with bandage. They will do this daily until sutures removed. Recommend sun protection of biopsy site.
Home Suture Removal Text: Patient was provided a home suture removal kit and will remove their sutures at home.  If they have any questions or difficulties, they will call the office.
Notification Instructions: Patient will be notified of biopsy results. However, patient instructed to call the office if not contacted within 2 weeks.
Billing Type: Third-Party Bill
Information: Selecting Yes will display possible errors in your note based on the variables you have selected. This validation is only offered as a suggestion for you. PLEASE NOTE THAT THE VALIDATION TEXT WILL BE REMOVED WHEN YOU FINALIZE YOUR NOTE. IF YOU WANT TO FAX A PRELIMINARY NOTE YOU WILL NEED TO TOGGLE THIS TO 'NO' IF YOU DO NOT WANT IT IN YOUR FAXED NOTE.

## 2024-01-31 NOTE — PROCEDURE: PRESCRIPTION MEDICATION MANAGEMENT
Discontinue Regimen: Antihistamines
Detail Level: Zone
Initiate Treatment: Triamcinalone to the affected areas.
Plan: Will wait for biopsy results and discuss further treatment
Render In Strict Bullet Format?: No

## 2024-02-05 ENCOUNTER — APPOINTMENT (RX ONLY)
Dept: URBAN - METROPOLITAN AREA CLINIC 15 | Facility: CLINIC | Age: 89
Setting detail: DERMATOLOGY
End: 2024-02-05

## 2024-02-05 DIAGNOSIS — L30.8 OTHER SPECIFIED DERMATITIS: ICD-10-CM

## 2024-02-05 PROCEDURE — ? ORDER TESTS

## 2024-02-05 PROCEDURE — ? ADDITIONAL NOTES

## 2024-02-05 PROCEDURE — ? COUNSELING

## 2024-02-05 PROCEDURE — ? REFERRAL

## 2024-02-05 ASSESSMENT — LOCATION ZONE DERM: LOCATION ZONE: TRUNK

## 2024-02-05 ASSESSMENT — LOCATION DETAILED DESCRIPTION DERM: LOCATION DETAILED: STERNUM

## 2024-02-05 ASSESSMENT — LOCATION SIMPLE DESCRIPTION DERM: LOCATION SIMPLE: CHEST

## 2024-02-05 NOTE — PROCEDURE: ADDITIONAL NOTES
Detail Level: Simple
Render Risk Assessment In Note?: no
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 Started 10/23 ago after being treated w/ abx (amox & cephalexin) and has persisted. bx x 3--urticarial dermatitis x 2 & granulomatous dermatitis (ruptured folliculitis) x 1. DIF neg. BPAgs neg.  cbc, cmp, esr/crp, tsh WNL. MARY ANN neg. JULI pending. SPEP/UPEP pending. Smear pending.\\n\\n Only mild improvement w/ topical steroids and IMK. No improvement w/ doxy, nbuvb, antihistamines (higher doses), itraconazole, ciprofloxacin (did have a bit of pseudomonas on PCR but cxs have been neg). \\n\\nPlease eval for any underlying triggers & consideration of tx. Xolair? I'm considering dupi or MTX. \\n\\nwill remind him to stop supplements at his f/u.

## 2024-02-05 NOTE — PROCEDURE: ORDER TESTS
Bill For Surgical Tray: no
Lab Facility: 0
Expected Date Of Service: 02/05/2024
Billing Type: Third-Party Bill

## 2024-02-06 ENCOUNTER — APPOINTMENT (RX ONLY)
Dept: URBAN - METROPOLITAN AREA CLINIC 15 | Facility: CLINIC | Age: 89
Setting detail: DERMATOLOGY
End: 2024-02-06

## 2024-02-07 ENCOUNTER — APPOINTMENT (RX ONLY)
Dept: URBAN - METROPOLITAN AREA CLINIC 15 | Facility: CLINIC | Age: 89
Setting detail: DERMATOLOGY
End: 2024-02-07

## 2024-02-07 DIAGNOSIS — L30.8 OTHER SPECIFIED DERMATITIS: ICD-10-CM | Status: INADEQUATELY CONTROLLED

## 2024-02-07 DIAGNOSIS — Z48.02 ENCOUNTER FOR REMOVAL OF SUTURES: ICD-10-CM

## 2024-02-07 PROCEDURE — ? DUPIXENT INJECTION

## 2024-02-07 PROCEDURE — ? DUPIXENT INITIATION

## 2024-02-07 PROCEDURE — ? SUTURE REMOVAL (GLOBAL PERIOD)

## 2024-02-07 PROCEDURE — 96372 THER/PROPH/DIAG INJ SC/IM: CPT

## 2024-02-07 PROCEDURE — ? SEPARATE AND IDENTIFIABLE DOCUMENTATION

## 2024-02-07 PROCEDURE — ? ADDITIONAL NOTES

## 2024-02-07 PROCEDURE — 99214 OFFICE O/P EST MOD 30 MIN: CPT | Mod: 25

## 2024-02-07 PROCEDURE — ? DISCUSSION OF MANAGEMENT WITH EXTERNAL PROVIDER

## 2024-02-07 PROCEDURE — ? PRESCRIPTION MEDICATION MANAGEMENT

## 2024-02-07 PROCEDURE — ? COUNSELING

## 2024-02-07 ASSESSMENT — LOCATION ZONE DERM
LOCATION ZONE: LEG
LOCATION ZONE: TRUNK

## 2024-02-07 ASSESSMENT — LOCATION SIMPLE DESCRIPTION DERM
LOCATION SIMPLE: CHEST
LOCATION SIMPLE: RIGHT UPPER BACK
LOCATION SIMPLE: LEFT THIGH
LOCATION SIMPLE: RIGHT THIGH

## 2024-02-07 ASSESSMENT — LOCATION DETAILED DESCRIPTION DERM
LOCATION DETAILED: STERNUM
LOCATION DETAILED: LEFT MEDIAL INFERIOR CHEST
LOCATION DETAILED: RIGHT ANTERIOR PROXIMAL THIGH
LOCATION DETAILED: RIGHT SUPERIOR MEDIAL UPPER BACK
LOCATION DETAILED: LEFT ANTERIOR PROXIMAL THIGH

## 2024-02-07 NOTE — PROCEDURE: DUPIXENT INITIATION
Is Mycophenalate Contraindicated?: No
Pregnancy And Lactation Warning Text: There have not been adverse fetal risks in women taking Dupixent while pregnant. It is unknown if this medication is excreted in breast milk.
Detail Level: Zone
Diagnosis (Required): Atopic Dermatitis/Eczematous Dermatitis
Dupixent Monitoring Guidelines: There is no laboratory monitoring requirement with Dupixent.
Dupixent Dosing: 600 mg SC day 0 then 300 mg SC every other week

## 2024-02-07 NOTE — PROCEDURE: DUPIXENT INJECTION
J-Code: 
Treatment Number (Optional): 1
Include J-Code In Bill: No
Ndc (300 Mg Prefilled Pen): 2519-4337-03
Render If Medication Purchased By Clinic In Visit Note?: Yes
Administered By (Optional): SAULO
Lot # (Optional): [id number]
Ndc (300 Mg Prefilled Syringe): 6573-5031-69
Consent: The risks of pain and injection site reactions were reviewed with the patient prior to the injection.
Ndc (200 Mg Prefilled Syringe): 9732-0764-03
Syringe Size Used (Required For Enhanced Ndc): 300 mg/2ml prefilled pen
Date Of Next Injection: 2 Weeks
Ndc (200 Mg Prefilled Pen): 4787-9940-22
Expiration Date (Optional): 11.30.2025
Dupixent Dosing: 600 mg
Detail Level: None

## 2024-02-07 NOTE — PROCEDURE: SUTURE REMOVAL (GLOBAL PERIOD)
Detail Level: Detailed
Add 13806 Cpt? (Important Note: In 2017 The Use Of 54970 Is Being Tracked By Cms To Determine Future Global Period Reimbursement For Global Periods): no

## 2024-02-07 NOTE — PROCEDURE: DISCUSSION OF MANAGEMENT WITH EXTERNAL PROVIDER
Pathologist Name: dr. marcie antonio
Pathologist Discussion Details (Optional): disc path & rev photos w/ marcie antonio. she agrees w/ D[facility]; mutual decision to trial dupixent next.
Detail Level: Zone

## 2024-02-07 NOTE — PROCEDURE: PRESCRIPTION MEDICATION MANAGEMENT
Continue Regimen: triam bid prn
Discontinue Regimen: allopurinol and multivitamin.
Detail Level: Zone
Initiate Treatment: Dupixent 600 mg today then 300 mg q2wks
Plan: Patient will come back in 2 weeks for #2 injection.
Render In Strict Bullet Format?: No

## 2024-02-07 NOTE — PROCEDURE: ADDITIONAL NOTES
Detail Level: Simple
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 Started 10/23 ago after being treated w/ abx (amox & cephalexin) and has persisted. bx x 3--urticarial dermatitis x 2 & granulomatous dermatitis (ruptured folliculitis) x 1. DIF neg. BPAgs neg. cbc, cmp, esr/crp, tsh WNL. MARY ANN neg. JULI pending. SPEP/UPEP pending. Smear pending. IgE pending. H pylori breath test pending. Complement pending.\\n\\nOnly mild improvement w/ topical steroids and IMK. No improvement w/ doxy, nbuvb, antihistamines (higher doses), itraconazole, ciprofloxacin (did have a bit of pseudomonas on PCR but cxs have been neg).
Render Risk Assessment In Note?: no

## 2024-02-21 ENCOUNTER — APPOINTMENT (RX ONLY)
Dept: URBAN - METROPOLITAN AREA CLINIC 15 | Facility: CLINIC | Age: 89
Setting detail: DERMATOLOGY
End: 2024-02-21

## 2024-02-21 DIAGNOSIS — L30.8 OTHER SPECIFIED DERMATITIS: ICD-10-CM | Status: INADEQUATELY CONTROLLED

## 2024-02-21 PROCEDURE — ? ADDITIONAL NOTES

## 2024-02-21 PROCEDURE — 99214 OFFICE O/P EST MOD 30 MIN: CPT | Mod: 25

## 2024-02-21 PROCEDURE — ? DUPIXENT INJECTION

## 2024-02-21 PROCEDURE — ? SEPARATE AND IDENTIFIABLE DOCUMENTATION

## 2024-02-21 PROCEDURE — ? COUNSELING

## 2024-02-21 PROCEDURE — ? PRESCRIPTION MEDICATION MANAGEMENT

## 2024-02-21 PROCEDURE — 96372 THER/PROPH/DIAG INJ SC/IM: CPT

## 2024-02-21 ASSESSMENT — LOCATION SIMPLE DESCRIPTION DERM
LOCATION SIMPLE: CHEST
LOCATION SIMPLE: RIGHT UPPER BACK
LOCATION SIMPLE: RIGHT THIGH

## 2024-02-21 ASSESSMENT — LOCATION DETAILED DESCRIPTION DERM
LOCATION DETAILED: STERNUM
LOCATION DETAILED: RIGHT ANTERIOR PROXIMAL THIGH
LOCATION DETAILED: RIGHT SUPERIOR MEDIAL UPPER BACK

## 2024-02-21 ASSESSMENT — LOCATION ZONE DERM
LOCATION ZONE: TRUNK
LOCATION ZONE: LEG

## 2024-02-21 NOTE — PROCEDURE: PRESCRIPTION MEDICATION MANAGEMENT
Continue Regimen: Dupixent 300 mg q2wks & Cerave itch cream
Discontinue Regimen: triam bid
Detail Level: Zone
Plan: Will follow up in 2 weeks for 3rd injection\\nconsider xolair (trying to get in touch w/ his allergist).
Render In Strict Bullet Format?: No

## 2024-02-21 NOTE — PROCEDURE: ADDITIONAL NOTES
Detail Level: Simple
Additional Notes: Since loading dose of dupi, he sees some improvement. Using cerave itch cream. He did see allergist this week. When he did lighter therapy treatments he saw no improvement. \\n\\nWith my exam I do see new papules developing on chest and neck. He states they’re not necessarily itchy. 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 luis
Render Risk Assessment In Note?: no

## 2024-02-21 NOTE — PROCEDURE: DUPIXENT INJECTION
J-Code: 
Treatment Number (Optional): 2
Include J-Code In Bill: No
[address] Billing Preferences: 1
Ndc (300 Mg Prefilled Pen): 4451-2511-96
Render If Medication Purchased By Clinic In Visit Note?: Yes
Administered By (Optional): SAULO
Lot # (Optional): [id number]
Ndc (300 Mg Prefilled Syringe): 5036-5682-15
Consent: The risks of pain and injection site reactions were reviewed with the patient prior to the injection.
Ndc (200 Mg Prefilled Syringe): 8801-4995-84
Syringe Size Used (Required For Enhanced Ndc): 300 mg/2ml prefilled pen
Date Of Next Injection: 2 Weeks
Ndc (200 Mg Prefilled Pen): 2884-9852-58
Expiration Date (Optional): 11.30.2025
Dupixent Dosing: 300 mg
Detail Level: None

## 2024-03-07 ENCOUNTER — APPOINTMENT (RX ONLY)
Dept: URBAN - METROPOLITAN AREA CLINIC 15 | Facility: CLINIC | Age: 89
Setting detail: DERMATOLOGY
End: 2024-03-07

## 2024-03-07 DIAGNOSIS — L30.8 OTHER SPECIFIED DERMATITIS: ICD-10-CM | Status: INADEQUATELY CONTROLLED

## 2024-03-07 PROCEDURE — ? MEDICATION COUNSELING

## 2024-03-07 PROCEDURE — ? DUPIXENT INJECTION

## 2024-03-07 PROCEDURE — ? SEPARATE AND IDENTIFIABLE DOCUMENTATION

## 2024-03-07 PROCEDURE — ? PRESCRIPTION

## 2024-03-07 PROCEDURE — ? COUNSELING

## 2024-03-07 PROCEDURE — 96372 THER/PROPH/DIAG INJ SC/IM: CPT

## 2024-03-07 PROCEDURE — ? PRESCRIPTION MEDICATION MANAGEMENT

## 2024-03-07 PROCEDURE — ? ADDITIONAL NOTES

## 2024-03-07 PROCEDURE — 99214 OFFICE O/P EST MOD 30 MIN: CPT | Mod: 25

## 2024-03-07 RX ORDER — METHOTREXATE SODIUM 2.5 MG/1
1 TABLET ORAL WEEKLY
Qty: 16 | Refills: 0 | Status: ERX | COMMUNITY
Start: 2024-03-07

## 2024-03-07 RX ORDER — FOLIC ACID 1 MG/1
1 TABLET ORAL QD
Qty: 30 | Refills: 0 | Status: ERX | COMMUNITY
Start: 2024-03-07

## 2024-03-07 RX ADMIN — FOLIC ACID 1: 1 TABLET ORAL at 00:00

## 2024-03-07 RX ADMIN — METHOTREXATE SODIUM 1: 2.5 TABLET ORAL at 00:00

## 2024-03-07 ASSESSMENT — LOCATION DETAILED DESCRIPTION DERM
LOCATION DETAILED: LEFT ANTERIOR PROXIMAL THIGH
LOCATION DETAILED: RIGHT SUPERIOR MEDIAL UPPER BACK
LOCATION DETAILED: STERNUM

## 2024-03-07 ASSESSMENT — LOCATION SIMPLE DESCRIPTION DERM
LOCATION SIMPLE: LEFT THIGH
LOCATION SIMPLE: CHEST
LOCATION SIMPLE: RIGHT UPPER BACK

## 2024-03-07 ASSESSMENT — LOCATION ZONE DERM
LOCATION ZONE: LEG
LOCATION ZONE: TRUNK

## 2024-03-07 NOTE — PROCEDURE: MEDICATION COUNSELING
Terbinafine Counseling: Patient counseling regarding adverse effects of terbinafine including but not limited to headache, diarrhea, rash, upset stomach, liver function test abnormalities, itching, taste/smell disturbance, nausea, abdominal pain, and flatulence.  There is a rare possibility of liver failure that can occur when taking terbinafine.  The patient understands that a baseline LFT and kidney function test may be required. The patient verbalized understanding of the proper use and possible adverse effects of terbinafine.  All of the patient's questions and concerns were addressed.
Klisyri Pregnancy And Lactation Text: It is unknown if this medication can harm a developing fetus or if it is excreted in breast milk.
Minocycline Counseling: Patient advised regarding possible photosensitivity and discoloration of the teeth, skin, lips, tongue and gums.  Patient instructed to avoid sunlight, if possible.  When exposed to sunlight, patients should wear protective clothing, sunglasses, and sunscreen.  The patient was instructed to call the office immediately if the following severe adverse effects occur:  hearing changes, easy bruising/bleeding, severe headache, or vision changes.  The patient verbalized understanding of the proper use and possible adverse effects of minocycline.  All of the patient's questions and concerns were addressed.
Gabapentin Counseling: I discussed with the patient the risks of gabapentin including but not limited to dizziness, somnolence, fatigue and ataxia.
Topical Retinoid counseling:  Patient advised to apply a pea-sized amount only at bedtime and wait 30 minutes after washing their face before applying.  If too drying, patient may add a non-comedogenic moisturizer. The patient verbalized understanding of the proper use and possible adverse effects of retinoids.  All of the patient's questions and concerns were addressed.
Spironolactone Pregnancy And Lactation Text: This medication can cause feminization of the male fetus and should be avoided during pregnancy. The active metabolite is also found in breast milk.
Topical Metronidazole Pregnancy And Lactation Text: This medication is Pregnancy Category B and considered safe during pregnancy.  It is also considered safe to use while breastfeeding.
Niacinamide Pregnancy And Lactation Text: These medications are considered safe during pregnancy.
Humira Counseling:  I discussed with the patient the risks of adalimumab including but not limited to myelosuppression, immunosuppression, autoimmune hepatitis, demyelinating diseases, lymphoma, and serious infections.  The patient understands that monitoring is required including a PPD at baseline and must alert us or the primary physician if symptoms of infection or other concerning signs are noted.
Cyclosporine Pregnancy And Lactation Text: This medication is Pregnancy Category C and it isn't know if it is safe during pregnancy. This medication is excreted in breast milk.
Winlevi Counseling:  I discussed with the patient the risks of topical clascoterone including but not limited to erythema, scaling, itching, and stinging. Patient voiced their understanding.
Opioid Pregnancy And Lactation Text: These medications can lead to premature delivery and should be avoided during pregnancy. These medications are also present in breast milk in small amounts.
Valtrex Counseling: I discussed with the patient the risks of valacyclovir including but not limited to kidney damage, nausea, vomiting and severe allergy.  The patient understands that if the infection seems to be worsening or is not improving, they are to call.
Benzoyl Peroxide Counseling: Patient counseled that medicine may cause skin irritation and bleach clothing.  In the event of skin irritation, the patient was advised to reduce the amount of the drug applied or use it less frequently.   The patient verbalized understanding of the proper use and possible adverse effects of benzoyl peroxide.  All of the patient's questions and concerns were addressed.
Tremfya Pregnancy And Lactation Text: The risk during pregnancy and breastfeeding is uncertain with this medication.
Olumiant Counseling: I discussed with the patient the risks of Olumiant therapy including but not limited to upper respiratory tract infections, shingles, cold sores, and nausea. Live vaccines should be avoided.  This medication has been linked to serious infections; higher rate of mortality; malignancy and lymphoproliferative disorders; major adverse cardiovascular events; thrombosis; gastrointestinal perforations; neutropenia; lymphopenia; anemia; liver enzyme elevations; and lipid elevations.
Cosentyx Counseling:  I discussed with the patient the risks of Cosentyx including but not limited to worsening of Crohn's disease, immunosuppression, allergic reactions and infections.  The patient understands that monitoring is required including a PPD at baseline and must alert us or the primary physician if symptoms of infection or other concerning signs are noted.
Protopic Pregnancy And Lactation Text: This medication is Pregnancy Category C. It is unknown if this medication is excreted in breast milk when applied topically.
Humira Pregnancy And Lactation Text: This medication is Pregnancy Category B and is considered safe during pregnancy. It is unknown if this medication is excreted in breast milk.
Methotrexate Counseling:  Patient counseled regarding adverse effects of methotrexate including but not limited to nausea, vomiting, abnormalities in liver function tests. Patients may develop mouth sores, rash, diarrhea, and abnormalities in blood counts. The patient understands that monitoring is required including LFT's and blood counts.  There is a rare possibility of scarring of the liver and lung problems that can occur when taking methotrexate. Persistent nausea, loss of appetite, pale stools, dark urine, cough, and shortness of breath should be reported immediately. Patient advised to discontinue methotrexate treatment at least three months before attempting to become pregnant.  I discussed the need for folate supplements while taking methotrexate.  These supplements can decrease side effects during methotrexate treatment. The patient verbalized understanding of the proper use and possible adverse effects of methotrexate.  All of the patient's questions and concerns were addressed.
Xolair Counseling:  Patient informed of potential adverse effects including but not limited to fever, muscle aches, rash and allergic reactions.  The patient verbalized understanding of the proper use and possible adverse effects of Xolair.  All of the patient's questions and concerns were addressed.
Isotretinoin Counseling: Patient should get monthly blood tests, not donate blood, not drive at night if vision affected, not share medication, and not undergo elective surgery for 6 months after tx completed. Side effects reviewed, pt to contact office should one occur.
Nsaids Counseling: NSAID Counseling: I discussed with the patient that NSAIDs should be taken with food. Prolonged use of NSAIDs can result in the development of stomach ulcers.  Patient advised to stop taking NSAIDs if abdominal pain occurs.  The patient verbalized understanding of the proper use and possible adverse effects of NSAIDs.  All of the patient's questions and concerns were addressed.
Winlevi Pregnancy And Lactation Text: This medication is considered safe during pregnancy and breastfeeding.
Benzoyl Peroxide Pregnancy And Lactation Text: This medication is Pregnancy Category C. It is unknown if benzoyl peroxide is excreted in breast milk.
Oxybutynin Pregnancy And Lactation Text: This medication is Pregnancy Category B and is considered safe during pregnancy. It is unknown if it is excreted in breast milk.
Simponi Counseling:  I discussed with the patient the risks of golimumab including but not limited to myelosuppression, immunosuppression, autoimmune hepatitis, demyelinating diseases, lymphoma, and serious infections.  The patient understands that monitoring is required including a PPD at baseline and must alert us or the primary physician if symptoms of infection or other concerning signs are noted.
Elidel Counseling: Patient may experience a mild burning sensation during topical application. Elidel is not approved in children less than 2 years of age. There have been case reports of hematologic and skin malignancies in patients using topical calcineurin inhibitors although causality is questionable.
Cephalexin Pregnancy And Lactation Text: This medication is Pregnancy Category B and considered safe during pregnancy.  It is also excreted in breast milk but can be used safely for shorter doses.
Albendazole Pregnancy And Lactation Text: This medication is Pregnancy Category C and it isn't known if it is safe during pregnancy. It is also excreted in breast milk.
Dutasteride Male Counseling: Dustasteride Counseling:  I discussed with the patient the risks of use of dutasteride including but not limited to decreased libido, decreased ejaculate volume, and gynecomastia. Women who can become pregnant should not handle medication.  All of the patient's questions and concerns were addressed.
Gabapentin Pregnancy And Lactation Text: This medication is Pregnancy Category C and isn't considered safe during pregnancy. It is excreted in breast milk.
Minocycline Pregnancy And Lactation Text: This medication is Pregnancy Category D and not consider safe during pregnancy. It is also excreted in breast milk.
Fluconazole Counseling:  Patient counseled regarding adverse effects of fluconazole including but not limited to headache, diarrhea, nausea, upset stomach, liver function test abnormalities, taste disturbance, and stomach pain.  There is a rare possibility of liver failure that can occur when taking fluconazole.  The patient understands that monitoring of LFTs and kidney function test may be required, especially at baseline. The patient verbalized understanding of the proper use and possible adverse effects of fluconazole.  All of the patient's questions and concerns were addressed.
Propranolol Counseling:  I discussed with the patient the risks of propranolol including but not limited to low heart rate, low blood pressure, low blood sugar, restlessness and increased cold sensitivity. They should call the office if they experience any of these side effects.
Elidel Pregnancy And Lactation Text: This medication is Pregnancy Category C. It is unknown if this medication is excreted in breast milk.
Detail Level: Simple
Terbinafine Pregnancy And Lactation Text: This medication is Pregnancy Category B and is considered safe during pregnancy. It is also excreted in breast milk and breast feeding isn't recommended.
Olumiant Pregnancy And Lactation Text: Based on animal studies, Olumiant may cause embryo-fetal harm when administered to pregnant women.  The medication should not be used in pregnancy.  Breastfeeding is not recommended during treatment.
Minoxidil Counseling: Minoxidil is a topical medication which can increase blood flow where it is applied. It is uncertain how this medication increases hair growth. Side effects are uncommon and include stinging and allergic reactions.
Valtrex Pregnancy And Lactation Text: this medication is Pregnancy Category B and is considered safe during pregnancy. This medication is not directly found in breast milk but it's metabolite acyclovir is present.
Rinvoq Counseling: I discussed with the patient the risks of Rinvoq therapy including but not limited to upper respiratory tract infections, shingles, cold sores, bronchitis, nausea, cough, fever, acne, and headache. Live vaccines should be avoided.  This medication has been linked to serious infections; higher rate of mortality; malignancy and lymphoproliferative disorders; major adverse cardiovascular events; thrombosis; thrombocytopenia, anemia, and neutropenia; lipid elevations; liver enzyme elevations; and gastrointestinal perforations.
Isotretinoin Pregnancy And Lactation Text: This medication is Pregnancy Category X and is considered extremely dangerous during pregnancy. It is unknown if it is excreted in breast milk.
VTAMA Counseling: I discussed with the patient that VTAMA is not for use in the eyes, mouth or mouth. They should call the office if they develop any signs of allergic reactions to VTAMA. The patient verbalized understanding of the proper use and possible adverse effects of VTAMA.  All of the patient's questions and concerns were addressed.
Use Enhanced Medication Counseling?: No
Methotrexate Pregnancy And Lactation Text: This medication is Pregnancy Category X and is known to cause fetal harm. This medication is excreted in breast milk.
Carac Counseling:  I discussed with the patient the risks of Carac including but not limited to erythema, scaling, itching, weeping, crusting, and pain.
Xolair Pregnancy And Lactation Text: This medication is Pregnancy Category B and is considered safe during pregnancy. This medication is excreted in breast milk.
Minoxidil Pregnancy And Lactation Text: This medication has not been assigned a Pregnancy Risk Category but animal studies failed to show danger with the topical medication. It is unknown if the medication is excreted in breast milk.
Clindamycin Counseling: I counseled the patient regarding use of clindamycin as an antibiotic for prophylactic and/or therapeutic purposes. Clindamycin is active against numerous classes of bacteria, including skin bacteria. Side effects may include nausea, diarrhea, gastrointestinal upset, rash, hives, yeast infections, and in rare cases, colitis.
Ivermectin Counseling:  Patient instructed to take medication on an empty stomach with a full glass of water.  Patient informed of potential adverse effects including but not limited to nausea, diarrhea, dizziness, itching, and swelling of the extremities or lymph nodes.  The patient verbalized understanding of the proper use and possible adverse effects of ivermectin.  All of the patient's questions and concerns were addressed.
Arava Counseling:  Patient counseled regarding adverse effects of Arava including but not limited to nausea, vomiting, abnormalities in liver function tests. Patients may develop mouth sores, rash, diarrhea, and abnormalities in blood counts. The patient understands that monitoring is required including LFTs and blood counts.  There is a rare possibility of scarring of the liver and lung problems that can occur when taking methotrexate. Persistent nausea, loss of appetite, pale stools, dark urine, cough, and shortness of breath should be reported immediately. Patient advised to discontinue Arava treatment and consult with a physician prior to attempting conception. The patient will have to undergo a treatment to eliminate Arava from the body prior to conception.
Qbrexza Counseling:  I discussed with the patient the risks of Qbrexza including but not limited to headache, mydriasis, blurred vision, dry eyes, nasal dryness, dry mouth, dry throat, dry skin, urinary hesitation, and constipation.  Local skin reactions including erythema, burning, stinging, and itching can also occur.
Ilumya Counseling: I discussed with the patient the risks of tildrakizumab including but not limited to immunosuppression, malignancy, posterior leukoencephalopathy syndrome, and serious infections.  The patient understands that monitoring is required including a PPD at baseline and must alert us or the primary physician if symptoms of infection or other concerning signs are noted.
Nsaids Pregnancy And Lactation Text: These medications are considered safe up to 30 weeks gestation. It is excreted in breast milk.
Dutasteride Female Counseling: Dutasteride Counseling:  I discussed with the patient the risks of use of dutasteride including but not limited to decreased libido and sexual dysfunction. Explained the teratogenic nature of the medication and stressed the importance of not getting pregnant during treatment. All of the patient's questions and concerns were addressed.
Fluconazole Pregnancy And Lactation Text: This medication is Pregnancy Category C and it isn't know if it is safe during pregnancy. It is also excreted in breast milk.
Tazorac Counseling:  Patient advised that medication is irritating and drying.  Patient may need to apply sparingly and wash off after an hour before eventually leaving it on overnight.  The patient verbalized understanding of the proper use and possible adverse effects of tazorac.  All of the patient's questions and concerns were addressed.
Eucrisa Counseling: Patient may experience a mild burning sensation during topical application. Eucrisa is not approved in children less than 3 months of age.
Cimetidine Counseling:  I discussed with the patient the risks of Cimetidine including but not limited to gynecomastia, headache, diarrhea, nausea, drowsiness, arrhythmias, pancreatitis, skin rashes, psychosis, bone marrow suppression and kidney toxicity.
Qbrexza Pregnancy And Lactation Text: There is no available data on Qbrexza use in pregnant women.  There is no available data on Qbrexza use in lactation.
Arava Pregnancy And Lactation Text: This medication is Pregnancy Category X and is absolutely contraindicated during pregnancy. It is unknown if it is excreted in breast milk.
Dutasteride Pregnancy And Lactation Text: This medication is absolutely contraindicated in women, especially during pregnancy and breast feeding. Feminization of male fetuses is possible if taking while pregnant.
Dupixent Counseling: I discussed with the patient the risks of dupilumab including but not limited to eye inflammation and irritation, cold sores, injection site reactions, allergic reactions and increased risk of parasitic infection. The patient understands that monitoring is required and they must alert us or the primary physician if symptoms of infection or other concerning signs are noted.
Glycopyrrolate Counseling:  I discussed with the patient the risks of glycopyrrolate including but not limited to skin rash, drowsiness, dry mouth, difficulty urinating, and blurred vision.
Skyrizi Counseling: I discussed with the patient the risks of risankizumab-rzaa including but not limited to immunosuppression, and serious infections.  The patient understands that monitoring is required including a PPD at baseline and must alert us or the primary physician if symptoms of infection or other concerning signs are noted.
Propranolol Pregnancy And Lactation Text: This medication is Pregnancy Category C and it isn't known if it is safe during pregnancy. It is excreted in breast milk.
Azathioprine Counseling:  I discussed with the patient the risks of azathioprine including but not limited to myelosuppression, immunosuppression, hepatotoxicity, lymphoma, and infections.  The patient understands that monitoring is required including baseline LFTs, Creatinine, possible TPMP genotyping and weekly CBCs for the first month and then every 2 weeks thereafter.  The patient verbalized understanding of the proper use and possible adverse effects of azathioprine.  All of the patient's questions and concerns were addressed.
Quinolones Counseling:  I discussed with the patient the risks of fluoroquinolones including but not limited to GI upset, allergic reaction, drug rash, diarrhea, dizziness, photosensitivity, yeast infections, liver function test abnormalities, tendonitis/tendon rupture.
Mirvaso Counseling: Mirvaso is a topical medication which can decrease superficial blood flow where applied. Side effects are uncommon and include stinging, redness and allergic reactions.
Vtama Pregnancy And Lactation Text: It is unknown if this medication can cause problems during pregnancy and breastfeeding.
Azathioprine Pregnancy And Lactation Text: This medication is Pregnancy Category D and isn't considered safe during pregnancy. It is unknown if this medication is excreted in breast milk.
Topical Steroids Counseling: I discussed with the patient that prolonged use of topical steroids can result in the increased appearance of superficial blood vessels (telangiectasias), lightening (hypopigmentation) and thinning of the skin (atrophy).  Patient understands to avoid using high potency steroids in skin folds, the groin or the face.  The patient verbalized understanding of the proper use and possible adverse effects of topical steroids.  All of the patient's questions and concerns were addressed.
Glycopyrrolate Pregnancy And Lactation Text: This medication is Pregnancy Category B and is considered safe during pregnancy. It is unknown if it is excreted breast milk.
Olanzapine Counseling- I discussed with the patient the common side effects of olanzapine including but are not limited to: lack of energy, dry mouth, increased appetite, sleepiness, tremor, constipation, dizziness, changes in behavior, or restlessness.  Explained that teenagers are more likely to experience headaches, abdominal pain, pain in the arms or legs, tiredness, and sleepiness.  Serious side effects include but are not limited: increased risk of death in elderly patients who are confused, have memory loss, or dementia-related psychosis; hyperglycemia; increased cholesterol and triglycerides; and weight gain.
Sotyktu Pregnancy And Lactation Text: There is insufficient data to evaluate whether or not Sotyktu is safe to use during pregnancy.   It is not known if Sotyktu passes into breast milk and whether or not it is safe to use when breastfeeding.  
Prednisone Counseling:  I discussed with the patient the risks of prolonged use of prednisone including but not limited to weight gain, insomnia, osteoporosis, mood changes, diabetes, susceptibility to infection, glaucoma and high blood pressure.  In cases where prednisone use is prolonged, patients should be monitored with blood pressure checks, serum glucose levels and an eye exam.  Additionally, the patient may need to be placed on GI prophylaxis, PCP prophylaxis, and calcium and vitamin D supplementation and/or a bisphosphonate.  The patient verbalized understanding of the proper use and the possible adverse effects of prednisone.  All of the patient's questions and concerns were addressed.
Clindamycin Pregnancy And Lactation Text: This medication can be used in pregnancy if certain situations. Clindamycin is also present in breast milk.
High Dose Vitamin A Counseling: Side effects reviewed, pt to contact office should one occur.
Carac Pregnancy And Lactation Text: This medication is Pregnancy Category X and contraindicated in pregnancy and in women who may become pregnant. It is unknown if this medication is excreted in breast milk.
Rinvoq Pregnancy And Lactation Text: Based on animal studies, Rinvoq may cause embryo-fetal harm when administered to pregnant women.  The medication should not be used in pregnancy.  Breastfeeding is not recommended during treatment and for 6 days after the last dose.
Clofazimine Counseling:  I discussed with the patient the risks of clofazimine including but not limited to skin and eye pigmentation, liver damage, nausea/vomiting, gastrointestinal bleeding and allergy.
Xeljanz Counseling: I discussed with the patient the risks of Xeljanz therapy including increased risk of infection, liver issues, headache, diarrhea, or cold symptoms. Live vaccines should be avoided. They were instructed to call if they have any problems.
Infliximab Counseling:  I discussed with the patient the risks of infliximab including but not limited to myelosuppression, immunosuppression, autoimmune hepatitis, demyelinating diseases, lymphoma, and serious infections.  The patient understands that monitoring is required including a PPD at baseline and must alert us or the primary physician if symptoms of infection or other concerning signs are noted.
Finasteride Male Counseling: Finasteride Counseling:  I discussed with the patient the risks of use of finasteride including but not limited to decreased libido, decreased ejaculate volume, gynecomastia, and depression. Women should not handle medication.  All of the patient's questions and concerns were addressed.
Doxycycline Counseling:  Patient counseled regarding possible photosensitivity and increased risk for sunburn.  Patient instructed to avoid sunlight, if possible.  When exposed to sunlight, patients should wear protective clothing, sunglasses, and sunscreen.  The patient was instructed to call the office immediately if the following severe adverse effects occur:  hearing changes, easy bruising/bleeding, severe headache, or vision changes.  The patient verbalized understanding of the proper use and possible adverse effects of doxycycline.  All of the patient's questions and concerns were addressed.
Sotyktu Counseling:  I discussed the most common side effects of Sotyktu including: common cold, sore throat, sinus infections, cold sores, canker sores, folliculitis, and acne.  I also discussed more serious side effects of Sotyktu including but not limited to: serious allergic reactions; increased risk for infections such as TB; cancers such as lymphomas; rhabdomyolysis and elevated CPK; and elevated triglycerides and liver enzymes. 
High Dose Vitamin A Pregnancy And Lactation Text: High dose vitamin A therapy is contraindicated during pregnancy and breast feeding.
Olanzapine Pregnancy And Lactation Text: This medication is pregnancy category C.   There are no adequate and well controlled trials with olanzapine in pregnant females.  Olanzapine should be used during pregnancy only if the potential benefit justifies the potential risk to the fetus.   In a study in lactating healthy women, olanzapine was excreted in breast milk.  It is recommended that women taking olanzapine should not breast feed.
Rhofade Counseling: Rhofade is a topical medication which can decrease superficial blood flow where applied. Side effects are uncommon and include stinging, redness and allergic reactions.
Calcipotriene Counseling:  I discussed with the patient the risks of calcipotriene including but not limited to erythema, scaling, itching, and irritation.
Griseofulvin Counseling:  I discussed with the patient the risks of griseofulvin including but not limited to photosensitivity, cytopenia, liver damage, nausea/vomiting and severe allergy.  The patient understands that this medication is best absorbed when taken with a fatty meal (e.g., ice cream or french fries).
SSKI Counseling:  I discussed with the patient the risks of SSKI including but not limited to thyroid abnormalities, metallic taste, GI upset, fever, headache, acne, arthralgias, paraesthesias, lymphadenopathy, easy bleeding, arrhythmias, and allergic reaction.
Tazorac Pregnancy And Lactation Text: This medication is not safe during pregnancy. It is unknown if this medication is excreted in breast milk.
Erivedge Counseling- I discussed with the patient the risks of Erivedge including but not limited to nausea, vomiting, diarrhea, constipation, weight loss, changes in the sense of taste, decreased appetite, muscle spasms, and hair loss.  The patient verbalized understanding of the proper use and possible adverse effects of Erivedge.  All of the patient's questions and concerns were addressed.
Hydroxychloroquine Counseling:  I discussed with the patient that a baseline ophthalmologic exam is needed at the start of therapy and every year thereafter while on therapy. A CBC may also be warranted for monitoring.  The side effects of this medication were discussed with the patient, including but not limited to agranulocytosis, aplastic anemia, seizures, rashes, retinopathy, and liver toxicity. Patient instructed to call the office should any adverse effect occur.  The patient verbalized understanding of the proper use and possible adverse effects of Plaquenil.  All the patient's questions and concerns were addressed.
Adbry Counseling: I discussed with the patient the risks of tralokinumab including but not limited to eye infection and irritation, cold sores, injection site reactions, worsening of asthma, allergic reactions and increased risk of parasitic infection.  Live vaccines should be avoided while taking tralokinumab. The patient understands that monitoring is required and they must alert us or the primary physician if symptoms of infection or other concerning signs are noted.
Cellcept Counseling:  I discussed with the patient the risks of mycophenolate mofetil including but not limited to infection/immunosuppression, GI upset, hypokalemia, hypercholesterolemia, bone marrow suppression, lymphoproliferative disorders, malignancy, GI ulceration/bleed/perforation, colitis, interstitial lung disease, kidney failure, progressive multifocal leukoencephalopathy, and birth defects.  The patient understands that monitoring is required including a baseline creatinine and regular CBC testing. In addition, patient must alert us immediately if symptoms of infection or other concerning signs are noted.
Rifampin Counseling: I discussed with the patient the risks of rifampin including but not limited to liver damage, kidney damage, red-orange body fluids, nausea/vomiting and severe allergy.
Topical Clindamycin Counseling: Patient counseled that this medication may cause skin irritation or allergic reactions.  In the event of skin irritation, the patient was advised to reduce the amount of the drug applied or use it less frequently.   The patient verbalized understanding of the proper use and possible adverse effects of clindamycin.  All of the patient's questions and concerns were addressed.
Calcipotriene Pregnancy And Lactation Text: The use of this medication during pregnancy or lactation is not recommended as there is insufficient data.
Griseofulvin Pregnancy And Lactation Text: This medication is Pregnancy Category X and is known to cause serious birth defects. It is unknown if this medication is excreted in breast milk but breast feeding should be avoided.
Doxepin Counseling:  Patient advised that the medication is sedating and not to drive a car after taking this medication. Patient informed of potential adverse effects including but not limited to dry mouth, urinary retention, and blurry vision.  The patient verbalized understanding of the proper use and possible adverse effects of doxepin.  All of the patient's questions and concerns were addressed.
Sski Pregnancy And Lactation Text: This medication is Pregnancy Category D and isn't considered safe during pregnancy. It is excreted in breast milk.
Hydroquinone Counseling:  Patient advised that medication may result in skin irritation, lightening (hypopigmentation), dryness, and burning.  In the event of skin irritation, the patient was advised to reduce the amount of the drug applied or use it less frequently.  Rarely, spots that are treated with hydroquinone can become darker (pseudoochronosis).  Should this occur, patient instructed to stop medication and call the office. The patient verbalized understanding of the proper use and possible adverse effects of hydroquinone.  All of the patient's questions and concerns were addressed.
Topical Steroids Applications Pregnancy And Lactation Text: Most topical steroids are considered safe to use during pregnancy and lactation.  Any topical steroid applied to the breast or nipple should be washed off before breastfeeding.
Mirvaso Pregnancy And Lactation Text: This medication has not been assigned a Pregnancy Risk Category. It is unknown if the medication is excreted in breast milk.
Zoryve Counseling:  I discussed with the patient that Zoryve is not for use in the eyes, mouth or vagina. The most commonly reported side effects include diarrhea, headache, insomnia, application site pain, upper respiratory tract infections, and urinary tract infections.  All of the patient's questions and concerns were addressed.
Cantharidin Counseling:  I discussed with the patient the risks of Cantharidin including but not limited to pain, redness, burning, itching, and blistering.
Stelara Counseling:  I discussed with the patient the risks of ustekinumab including but not limited to immunosuppression, malignancy, posterior leukoencephalopathy syndrome, and serious infections.  The patient understands that monitoring is required including a PPD at baseline and must alert us or the primary physician if symptoms of infection or other concerning signs are noted.
Xelarenz Pregnancy And Lactation Text: This medication is Pregnancy Category D and is not considered safe during pregnancy.  The risk during breast feeding is also uncertain.
Oral Minoxidil Counseling- I discussed with the patient the risks of oral minoxidil including but not limited to shortness of breath, swelling of the feet or ankles, dizziness, lightheadedness, unwanted hair growth and allergic reaction.  The patient verbalized understanding of the proper use and possible adverse effects of oral minoxidil.  All of the patient's questions and concerns were addressed.
Finasteride Female Counseling: Finasteride Counseling:  I discussed with the patient the risks of use of finasteride including but not limited to decreased libido and sexual dysfunction. Explained the teratogenic nature of the medication and stressed the importance of not getting pregnant during treatment. All of the patient's questions and concerns were addressed.
Doxycycline Pregnancy And Lactation Text: This medication is Pregnancy Category D and not consider safe during pregnancy. It is also excreted in breast milk but is considered safe for shorter treatment courses.
Topical Sulfur Applications Counseling: Topical Sulfur Counseling: Patient counseled that this medication may cause skin irritation or allergic reactions.  In the event of skin irritation, the patient was advised to reduce the amount of the drug applied or use it less frequently.   The patient verbalized understanding of the proper use and possible adverse effects of topical sulfur application.  All of the patient's questions and concerns were addressed.
Doxepin Pregnancy And Lactation Text: This medication is Pregnancy Category C and it isn't known if it is safe during pregnancy. It is also excreted in breast milk and breast feeding isn't recommended.
Finasteride Pregnancy And Lactation Text: This medication is absolutely contraindicated during pregnancy. It is unknown if it is excreted in breast milk.
Thalidomide Counseling: I discussed with the patient the risks of thalidomide including but not limited to birth defects, anxiety, weakness, chest pain, dizziness, cough and severe allergy.
Libtayo Counseling- I discussed with the patient the risks of Libtayo including but not limited to nausea, vomiting, diarrhea, and bone or muscle pain.  The patient verbalized understanding of the proper use and possible adverse effects of Libtayo.  All of the patient's questions and concerns were addressed.
Colchicine Counseling:  Patient counseled regarding adverse effects including but not limited to stomach upset (nausea, vomiting, stomach pain, or diarrhea).  Patient instructed to limit alcohol consumption while taking this medication.  Colchicine may reduce blood counts especially with prolonged use.  The patient understands that monitoring of kidney function and blood counts may be required, especially at baseline. The patient verbalized understanding of the proper use and possible adverse effects of colchicine.  All of the patient's questions and concerns were addressed.
Hydroxychloroquine Pregnancy And Lactation Text: This medication has been shown to cause fetal harm but it isn't assigned a Pregnancy Risk Category. There are small amounts excreted in breast milk.
Azithromycin Counseling:  I discussed with the patient the risks of azithromycin including but not limited to GI upset, allergic reaction, drug rash, diarrhea, and yeast infections.
Opzelura Counseling:  I discussed with the patient the risks of Opzelura including but not limited to nasopharngitis, bronchitis, ear infection, eosinophila, hives, diarrhea, folliculitis, tonsillitis, and rhinorrhea.  Taken orally, this medication has been linked to serious infections; higher rate of mortality; malignancy and lymphoproliferative disorders; major adverse cardiovascular events; thrombosis; thrombocytopenia, anemia, and neutropenia; and lipid elevations.
Aklief counseling:  Patient advised to apply a pea-sized amount only at bedtime and wait 30 minutes after washing their face before applying.  If too drying, patient may add a non-comedogenic moisturizer.  The most commonly reported side effects including irritation, redness, scaling, dryness, stinging, burning, itching, and increased risk of sunburn.  The patient verbalized understanding of the proper use and possible adverse effects of retinoids.  All of the patient's questions and concerns were addressed.
Rifampin Pregnancy And Lactation Text: This medication is Pregnancy Category C and it isn't know if it is safe during pregnancy. It is also excreted in breast milk and should not be used if you are breast feeding.
Adbry Pregnancy And Lactation Text: It is unknown if this medication will adversely affect pregnancy or breast feeding.
Cantharidin Pregnancy And Lactation Text: This medication has not been proven safe during pregnancy. It is unknown if this medication is excreted in breast milk.
Itraconazole Counseling:  I discussed with the patient the risks of itraconazole including but not limited to liver damage, nausea/vomiting, neuropathy, and severe allergy.  The patient understands that this medication is best absorbed when taken with acidic beverages such as non-diet cola or ginger ale.  The patient understands that monitoring is required including baseline LFTs and repeat LFTs at intervals.  The patient understands that they are to contact us or the primary physician if concerning signs are noted.
Cibinqo Counseling: I discussed with the patient the risks of Cibinqo therapy including but not limited to common cold, nausea, headache, cold sores, increased blood CPK levels, dizziness, UTIs, fatigue, acne, and vomitting. Live vaccines should be avoided.  This medication has been linked to serious infections; higher rate of mortality; malignancy and lymphoproliferative disorders; major adverse cardiovascular events; thrombosis; thrombocytopenia and lymphopenia; lipid elevations; and retinal detachment.
Zyclara Counseling:  I discussed with the patient the risks of imiquimod including but not limited to erythema, scaling, itching, weeping, crusting, and pain.  Patient understands that the inflammatory response to imiquimod is variable from person to person and was educated regarded proper titration schedule.  If flu-like symptoms develop, patient knows to discontinue the medication and contact us.
Azithromycin Pregnancy And Lactation Text: This medication is considered safe during pregnancy and is also secreted in breast milk.
Cyclophosphamide Counseling:  I discussed with the patient the risks of cyclophosphamide including but not limited to hair loss, hormonal abnormalities, decreased fertility, abdominal pain, diarrhea, nausea and vomiting, bone marrow suppression and infection. The patient understands that monitoring is required while taking this medication.
Acitretin Counseling:  I discussed with the patient the risks of acitretin including but not limited to hair loss, dry lips/skin/eyes, liver damage, hyperlipidemia, depression/suicidal ideation, photosensitivity.  Serious rare side effects can include but are not limited to pancreatitis, pseudotumor cerebri, bony changes, clot formation/stroke/heart attack.  Patient understands that alcohol is contraindicated since it can result in liver toxicity and significantly prolong the elimination of the drug by many years.
Bimzelx Counseling:  I discussed with the patient the risks of Bimzelx including but not limited to depression, immunosuppression, allergic reactions and infections.  The patient understands that monitoring is required including a PPD at baseline and must alert us or the primary physician if symptoms of infection or other concerning signs are noted.
Opzelura Pregnancy And Lactation Text: There is insufficient data to evaluate drug-associated risk for major birth defects, miscarriage, or other adverse maternal or fetal outcomes.  There is a pregnancy registry that monitors pregnancy outcomes in pregnant persons exposed to the medication during pregnancy.  It is unknown if this medication is excreted in breast milk.  Do not breastfeed during treatment and for about 4 weeks after the last dose.
Aklief Pregnancy And Lactation Text: It is unknown if this medication is safe to use during pregnancy.  It is unknown if this medication is excreted in breast milk.  Breastfeeding women should use the topical cream on the smallest area of the skin for the shortest time needed while breastfeeding.  Do not apply to nipple and areola.
Low Dose Naltrexone Counseling- I discussed with the patient the potential risks and side effects of low dose naltrexone including but not limited to: more vivid dreams, headaches, nausea, vomiting, abdominal pain, fatigue, dizziness, and anxiety.
Erythromycin Counseling:  I discussed with the patient the risks of erythromycin including but not limited to GI upset, allergic reaction, drug rash, diarrhea, increase in liver enzymes, and yeast infections.
Solaraze Counseling:  I discussed with the patient the risks of Solaraze including but not limited to erythema, scaling, itching, weeping, crusting, and pain.
Oral Minoxidil Pregnancy And Lactation Text: This medication should only be used when clearly needed if you are pregnant, attempting to become pregnant or breast feeding.
5-Fu Counseling: 5-Fluorouracil Counseling:  I discussed with the patient the risks of 5-fluorouracil including but not limited to erythema, scaling, itching, weeping, crusting, and pain.
Rituxan Counseling:  I discussed with the patient the risks of Rituxan infusions. Side effects can include infusion reactions, severe drug rashes including mucocutaneous reactions, reactivation of latent hepatitis and other infections and rarely progressive multifocal leukoencephalopathy.  All of the patient's questions and concerns were addressed.
Imiquimod Counseling:  I discussed with the patient the risks of imiquimod including but not limited to erythema, scaling, itching, weeping, crusting, and pain.  Patient understands that the inflammatory response to imiquimod is variable from person to person and was educated regarded proper titration schedule.  If flu-like symptoms develop, patient knows to discontinue the medication and contact us.
Birth Control Pills Counseling: Birth Control Pill Counseling: I discussed with the patient the potential side effects of OCPs including but not limited to increased risk of stroke, heart attack, thrombophlebitis, deep venous thrombosis, hepatic adenomas, breast changes, GI upset, headaches, and depression.  The patient verbalized understanding of the proper use and possible adverse effects of OCPs. All of the patient's questions and concerns were addressed.
Hydroxyzine Counseling: Patient advised that the medication is sedating and not to drive a car after taking this medication.  Patient informed of potential adverse effects including but not limited to dry mouth, urinary retention, and blurry vision.  The patient verbalized understanding of the proper use and possible adverse effects of hydroxyzine.  All of the patient's questions and concerns were addressed.
Erythromycin Pregnancy And Lactation Text: This medication is Pregnancy Category B and is considered safe during pregnancy. It is also excreted in breast milk.
Solaraze Pregnancy And Lactation Text: This medication is Pregnancy Category B and is considered safe. There is some data to suggest avoiding during the third trimester. It is unknown if this medication is excreted in breast milk.
Sarecycline Counseling: Patient advised regarding possible photosensitivity and discoloration of the teeth, skin, lips, tongue and gums.  Patient instructed to avoid sunlight, if possible.  When exposed to sunlight, patients should wear protective clothing, sunglasses, and sunscreen.  The patient was instructed to call the office immediately if the following severe adverse effects occur:  hearing changes, easy bruising/bleeding, severe headache, or vision changes.  The patient verbalized understanding of the proper use and possible adverse effects of sarecycline.  All of the patient's questions and concerns were addressed.
Dupixent Pregnancy And Lactation Text: This medication likely crosses the placenta but the risk for the fetus is uncertain. This medication is excreted in breast milk.
Topical Ketoconazole Counseling: Patient counseled that this medication may cause skin irritation or allergic reactions.  In the event of skin irritation, the patient was advised to reduce the amount of the drug applied or use it less frequently.   The patient verbalized understanding of the proper use and possible adverse effects of ketoconazole.  All of the patient's questions and concerns were addressed.
Taltz Counseling: I discussed with the patient the risks of ixekizumab including but not limited to immunosuppression, serious infections, worsening of inflammatory bowel disease and drug reactions.  The patient understands that monitoring is required including a PPD at baseline and must alert us or the primary physician if symptoms of infection or other concerning signs are noted.
Cibinqo Pregnancy And Lactation Text: It is unknown if this medication will adversely affect pregnancy or breast feeding.  You should not take this medication if you are currently pregnant or planning a pregnancy or while breastfeeding.
Libtayo Pregnancy And Lactation Text: This medication is contraindicated in pregnancy and when breast feeding.
Topical Sulfur Applications Pregnancy And Lactation Text: This medication is considered safe during pregnancy and breast feeding secondary to limited systemic absorption.
Bimzelx Pregnancy And Lactation Text: This medication crosses the placenta and the safety is uncertain during pregnancy. It is unknown if this medication is present in breast milk.
Azelaic Acid Counseling: Patient counseled that medicine may cause skin irritation and to avoid applying near the eyes.  In the event of skin irritation, the patient was advised to reduce the amount of the drug applied or use it less frequently.   The patient verbalized understanding of the proper use and possible adverse effects of azelaic acid.  All of the patient's questions and concerns were addressed.
Picato Counseling:  I discussed with the patient the risks of Picato including but not limited to erythema, scaling, itching, weeping, crusting, and pain.
Enbrel Counseling:  I discussed with the patient the risks of etanercept including but not limited to myelosuppression, immunosuppression, autoimmune hepatitis, demyelinating diseases, lymphoma, and infections.  The patient understands that monitoring is required including a PPD at baseline and must alert us or the primary physician if symptoms of infection or other concerning signs are noted.
Ketoconazole Counseling:   Patient counseled regarding improving absorption with orange juice.  Adverse effects include but are not limited to breast enlargement, headache, diarrhea, nausea, upset stomach, liver function test abnormalities, taste disturbance, and stomach pain.  There is a rare possibility of liver failure that can occur when taking ketoconazole. The patient understands that monitoring of LFTs may be required, especially at baseline. The patient verbalized understanding of the proper use and possible adverse effects of ketoconazole.  All of the patient's questions and concerns were addressed.
Cyclophosphamide Pregnancy And Lactation Text: This medication is Pregnancy Category D and it isn't considered safe during pregnancy. This medication is excreted in breast milk.
Wartpeel Counseling:  I discussed with the patient the risks of Wartpeel including but not limited to erythema, scaling, itching, weeping, crusting, and pain.
Otezla Counseling: The side effects of Otezla were discussed with the patient, including but not limited to worsening or new depression, weight loss, diarrhea, nausea, upper respiratory tract infection, and headache. Patient instructed to call the office should any adverse effect occur.  The patient verbalized understanding of the proper use and possible adverse effects of Otezla.  All the patient's questions and concerns were addressed.
Rituxan Pregnancy And Lactation Text: This medication is Pregnancy Category C and it isn't know if it is safe during pregnancy. It is unknown if this medication is excreted in breast milk but similar antibodies are known to be excreted.
Low Dose Naltrexone Pregnancy And Lactation Text: Naltrexone is pregnancy category C.  There have been no adequate and well-controlled studies in pregnant women.  It should be used in pregnancy only if the potential benefit justifies the potential risk to the fetus.   Limited data indicates that naltrexone is minimally excreted into breastmilk.
Bactrim Counseling:  I discussed with the patient the risks of sulfa antibiotics including but not limited to GI upset, allergic reaction, drug rash, diarrhea, dizziness, photosensitivity, and yeast infections.  Rarely, more serious reactions can occur including but not limited to aplastic anemia, agranulocytosis, methemoglobinemia, blood dyscrasias, liver or kidney failure, lung infiltrates or desquamative/blistering drug rashes.
Acitretin Pregnancy And Lactation Text: This medication is Pregnancy Category X and should not be given to women who are pregnant or may become pregnant in the future. This medication is excreted in breast milk.
Soolantra Counseling: I discussed with the patients the risks of topial Soolantra. This is a medicine which decreases the number of mites and inflammation in the skin. You experience burning, stinging, eye irritation or allergic reactions.  Please call our office if you develop any problems from using this medication.
Siliq Counseling:  I discussed with the patient the risks of Siliq including but not limited to new or worsening depression, suicidal thoughts and behavior, immunosuppression, malignancy, posterior leukoencephalopathy syndrome, and serious infections.  The patient understands that monitoring is required including a PPD at baseline and must alert us or the primary physician if symptoms of infection or other concerning signs are noted. There is also a special program designed to monitor depression which is required with Siliq.
Otezla Pregnancy And Lactation Text: This medication is Pregnancy Category C and it isn't known if it is safe during pregnancy. It is unknown if it is excreted in breast milk.
Drysol Counseling:  I discussed with the patient the risks of drysol/aluminum chloride including but not limited to skin rash, itching, irritation, burning.
Dapsone Counseling: I discussed with the patient the risks of dapsone including but not limited to hemolytic anemia, agranulocytosis, rashes, methemoglobinemia, kidney failure, peripheral neuropathy, headaches, GI upset, and liver toxicity.  Patients who start dapsone require monitoring including baseline LFTs and weekly CBCs for the first month, then every month thereafter.  The patient verbalized understanding of the proper use and possible adverse effects of dapsone.  All of the patient's questions and concerns were addressed.
Birth Control Pills Pregnancy And Lactation Text: This medication should be avoided if pregnant and for the first 30 days post-partum.
Litfulo Counseling: I discussed with the patient the risks of Litfulo therapy including but not limited to upper respiratory tract infections, shingles, cold sores, and nausea. Live vaccines should be avoided.  This medication has been linked to serious infections; higher rate of mortality; malignancy and lymphoproliferative disorders; major adverse cardiovascular events; thrombosis; gastrointestinal perforations; neutropenia; lymphopenia; anemia; liver enzyme elevations; and lipid elevations.
Tranexamic Acid Counseling:  Patient advised of the small risk of bleeding problems with tranexamic acid. They were also instructed to call if they developed any nausea, vomiting or diarrhea. All of the patient's questions and concerns were addressed.
Odomzo Counseling- I discussed with the patient the risks of Odomzo including but not limited to nausea, vomiting, diarrhea, constipation, weight loss, changes in the sense of taste, decreased appetite, muscle spasms, and hair loss.  The patient verbalized understanding of the proper use and possible adverse effects of Odomzo.  All of the patient's questions and concerns were addressed.
Hydroxyzine Pregnancy And Lactation Text: This medication is not safe during pregnancy and should not be taken. It is also excreted in breast milk and breast feeding isn't recommended.
Metronidazole Counseling:  I discussed with the patient the risks of metronidazole including but not limited to seizures, nausea/vomiting, a metallic taste in the mouth, nausea/vomiting and severe allergy.
Tremfya Counseling: I discussed with the patient the risks of guselkumab including but not limited to immunosuppression, serious infections, and drug reactions.  The patient understands that monitoring is required including a PPD at baseline and must alert us or the primary physician if symptoms of infection or other concerning signs are noted.
Litfulo Pregnancy And Lactation Text: Based on animal studies, Lifulo may cause embryo-fetal harm when administered to pregnant women.  The medication should not be used in pregnancy.  Breastfeeding is not recommended during treatment.
Niacinamide Counseling: I recommended taking niacin or niacinamide, also know as vitamin B3, twice daily. Recent evidence suggests that taking vitamin B3 (500 mg twice daily) can reduce the risk of actinic keratoses and non-melanoma skin cancers. Side effects of vitamin B3 include flushing and headache.
Opioid Counseling: I discussed with the patient the potential side effects of opioids including but not limited to addiction, altered mental status, and depression. I stressed avoiding alcohol, benzodiazepines, muscle relaxants and sleep aids unless specifically okayed by a physician. The patient verbalized understanding of the proper use and possible adverse effects of opioids. All of the patient's questions and concerns were addressed. They were instructed to flush the remaining pills down the toilet if they did not need them for pain.
Cyclosporine Counseling:  I discussed with the patient the risks of cyclosporine including but not limited to hypertension, gingival hyperplasia,myelosuppression, immunosuppression, liver damage, kidney damage, neurotoxicity, lymphoma, and serious infections. The patient understands that monitoring is required including baseline blood pressure, CBC, CMP, lipid panel and uric acid, and then 1-2 times monthly CMP and blood pressure.
Spironolactone Counseling: Patient advised regarding risks of diarrhea, abdominal pain, hyperkalemia, birth defects (for female patients), liver toxicity and renal toxicity. The patient may need blood work to monitor liver and kidney function and potassium levels while on therapy. The patient verbalized understanding of the proper use and possible adverse effects of spironolactone.  All of the patient's questions and concerns were addressed.
Ketoconazole Pregnancy And Lactation Text: This medication is Pregnancy Category C and it isn't know if it is safe during pregnancy. It is also excreted in breast milk and breast feeding isn't recommended.
Klisyri Counseling:  I discussed with the patient the risks of Klisyri including but not limited to erythema, scaling, itching, weeping, crusting, and pain.
Tranexamic Acid Pregnancy And Lactation Text: It is unknown if this medication is safe during pregnancy or breast feeding.
Dapsone Pregnancy And Lactation Text: This medication is Pregnancy Category C and is not considered safe during pregnancy or breast feeding.
Bactrim Pregnancy And Lactation Text: This medication is Pregnancy Category D and is known to cause fetal risk.  It is also excreted in breast milk.
Bexarotene Counseling:  I discussed with the patient the risks of bexarotene including but not limited to hair loss, dry lips/skin/eyes, liver abnormalities, hyperlipidemia, pancreatitis, depression/suicidal ideation, photosensitivity, drug rash/allergic reactions, hypothyroidism, anemia, leukopenia, infection, cataracts, and teratogenicity.  Patient understands that they will need regular blood tests to check lipid profile, liver function tests, white blood cell count, thyroid function tests and pregnancy test if applicable.
Azelaic Acid Pregnancy And Lactation Text: This medication is considered safe during pregnancy and breast feeding.
Cimzia Counseling:  I discussed with the patient the risks of Cimzia including but not limited to immunosuppression, allergic reactions and infections.  The patient understands that monitoring is required including a PPD at baseline and must alert us or the primary physician if symptoms of infection or other concerning signs are noted.
Tetracycline Counseling: Patient counseled regarding possible photosensitivity and increased risk for sunburn.  Patient instructed to avoid sunlight, if possible.  When exposed to sunlight, patients should wear protective clothing, sunglasses, and sunscreen.  The patient was instructed to call the office immediately if the following severe adverse effects occur:  hearing changes, easy bruising/bleeding, severe headache, or vision changes.  The patient verbalized understanding of the proper use and possible adverse effects of tetracycline.  All of the patient's questions and concerns were addressed. Patient understands to avoid pregnancy while on therapy due to potential birth defects.
Topical Metronidazole Counseling: Metronidazole is a topical antibiotic medication. You may experience burning, stinging, redness, or allergic reactions.  Please call our office if you develop any problems from using this medication.
Cephalexin Counseling: I counseled the patient regarding use of cephalexin as an antibiotic for prophylactic and/or therapeutic purposes. Cephalexin (commonly prescribed under brand name Keflex) is a cephalosporin antibiotic which is active against numerous classes of bacteria, including most skin bacteria. Side effects may include nausea, diarrhea, gastrointestinal upset, rash, hives, yeast infections, and in rare cases, hepatitis, kidney disease, seizures, fever, confusion, neurologic symptoms, and others. Patients with severe allergies to penicillin medications are cautioned that there is about a 10% incidence of cross-reactivity with cephalosporins. When possible, patients with penicillin allergies should use alternatives to cephalosporins for antibiotic therapy.
Bexarotene Pregnancy And Lactation Text: This medication is Pregnancy Category X and should not be given to women who are pregnant or may become pregnant. This medication should not be used if you are breast feeding.
Protopic Counseling: Patient may experience a mild burning sensation during topical application. Protopic is not approved in children less than 2 years of age. There have been case reports of hematologic and skin malignancies in patients using topical calcineurin inhibitors although causality is questionable.
Albendazole Counseling:  I discussed with the patient the risks of albendazole including but not limited to cytopenia, kidney damage, nausea/vomiting and severe allergy.  The patient understands that this medication is being used in an off-label manner.
Cimzia Pregnancy And Lactation Text: This medication crosses the placenta but can be considered safe in certain situations. Cimzia may be excreted in breast milk.
Metronidazole Pregnancy And Lactation Text: This medication is Pregnancy Category B and considered safe during pregnancy.  It is also excreted in breast milk.
Soolantra Pregnancy And Lactation Text: This medication is Pregnancy Category C. This medication is considered safe during breast feeding.
Oxybutynin Counseling:  I discussed with the patient the risks of oxybutynin including but not limited to skin rash, drowsiness, dry mouth, difficulty urinating, and blurred vision.

## 2024-03-07 NOTE — PROCEDURE: DUPIXENT INJECTION
Use Enhanced Ndc?: Yes
69915 Billing Preferences: 1
Expiration Date (Optional): 2025-10-31
Lot # (Optional): [id number]
Hide Non-Enhanced Ndc Variable: No
Ndc (300 Mg Prefilled Syringe): 5075-0610-53
Ndc (300 Mg Prefilled Pen): 9438-4858-56
J-Code: 
Administered By (Optional): JOSE
Treatment Number (Optional): 3
Syringe Size Used (Required For Enhanced Ndc): 300 mg/2ml prefilled pen
Ndc (200 Mg Prefilled Pen): 8544-1655-36
Consent: The risks of pain and injection site reactions were reviewed with the patient prior to the injection.
Dupixent Dosing: 300 mg
Ndc (200 Mg Prefilled Syringe): 3026-0910-18
Detail Level: None

## 2024-03-07 NOTE — PROCEDURE: PRESCRIPTION MEDICATION MANAGEMENT
Continue Regimen: Cerave itch cream
Detail Level: Zone
Initiate Treatment: Methotrexate 10mg weekly, folic acid 1mg qd
Plan: Consider xolair (trying to get in touch w/ his allergist).
Render In Strict Bullet Format?: No

## 2024-03-07 NOTE — PROCEDURE: ADDITIONAL NOTES
Detail Level: Simple
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 Started 10/23 ago after being treated w/ abx (amox & cephalexin) and has persisted. bx x 3--urticarial dermatitis x 2 & granulomatous dermatitis (ruptured folliculitis) x 1.\\nDIF neg. BPAgs neg. cbc, cmp, esr/crp, tsh WNL. MARY ANN neg. JULI neg. SPEP/UPEP w/ m spike--heme/onc consult pending. Smear pending. IgE WNL. H pylori breath test negative. complement WNL. Most recent TSH was trending towards hyperthyroidism. Awaiting further A/I w/u. \\n\\nOnly mild improvement w/ topical steroids and IMK. No improvement w/ doxy, nbuvb, antihistamines (higher doses), itraconazole, ciprofloxacin (did\\nhave a bit of pseudomonas on PCR but cxs have been neg).\\n\\n\\n\\nPt reports that he has been on thyroid medication for a while, upcoming appointment 3/14\\ndiscussed SPEP irregularity, pt is seeing hematologist 4/4\\nComplains of joint pain (knees and hips while talking dog) and leg muscle cramping since starting dupixent \\nFeels no more than 10% improvement since starting dupixent - chest is better but back isn’t\\nUpon exam, bruising seen \\nWill need workup for multiple myeloma with heme/onc\\nHas been on plavix since 1999 which may be the cause of bruising\\nRecommended seeing Dr. Pagan and getting lab workup\\nDiscussed whether or not to try methotrexate, may impact further testing, wouldn’t need bloodwork to initiate but would need monitoring\\nRash has been impacting pts quality of life, pt is reliant on wife to apply creams\\nPt reports 4-5 bowel movements daily, denies appearance of blood in stool, denies gaining weight - defer management w PCP\\nPt denies any significant change since stopping topical steroids\\nDiscussed CT CAP to ru cancer if further workups are negative
Render Risk Assessment In Note?: no

## 2024-03-25 ENCOUNTER — APPOINTMENT (RX ONLY)
Dept: URBAN - METROPOLITAN AREA CLINIC 15 | Facility: CLINIC | Age: 89
Setting detail: DERMATOLOGY
End: 2024-03-25

## 2024-03-25 DIAGNOSIS — L30.8 OTHER SPECIFIED DERMATITIS: ICD-10-CM

## 2024-03-25 PROCEDURE — ? COUNSELING

## 2024-03-25 PROCEDURE — ? DISCUSSION OF MANAGEMENT WITH EXTERNAL PROVIDER

## 2024-03-25 PROCEDURE — ? ADDITIONAL NOTES

## 2024-03-25 PROCEDURE — ? PRESCRIPTION MEDICATION MANAGEMENT

## 2024-03-25 PROCEDURE — ? MEDICATION COUNSELING

## 2024-03-25 PROCEDURE — ? SEPARATE AND IDENTIFIABLE DOCUMENTATION

## 2024-03-25 PROCEDURE — ? DUPIXENT INJECTION

## 2024-03-25 PROCEDURE — 96372 THER/PROPH/DIAG INJ SC/IM: CPT

## 2024-03-25 PROCEDURE — 99214 OFFICE O/P EST MOD 30 MIN: CPT | Mod: 25

## 2024-03-25 ASSESSMENT — LOCATION SIMPLE DESCRIPTION DERM
LOCATION SIMPLE: CHEST
LOCATION SIMPLE: RIGHT UPPER BACK
LOCATION SIMPLE: ABDOMEN

## 2024-03-25 ASSESSMENT — LOCATION DETAILED DESCRIPTION DERM
LOCATION DETAILED: STERNUM
LOCATION DETAILED: RIGHT LATERAL ABDOMEN
LOCATION DETAILED: RIGHT SUPERIOR MEDIAL UPPER BACK

## 2024-03-25 ASSESSMENT — LOCATION ZONE DERM: LOCATION ZONE: TRUNK

## 2024-03-25 NOTE — PROCEDURE: DISCUSSION OF MANAGEMENT WITH EXTERNAL PROVIDER
Consultant's Name: dr. jahaira chapman & dr. carvajal
Detail Level: Zone
Consultants Discussion Details (Optional): both are ok w/ respective testing while he's on mtx. allergy open to tryingxolair pending their testing.

## 2024-03-25 NOTE — PROCEDURE: DUPIXENT INJECTION
Use Enhanced Ndc?: Yes
18900 Billing Preferences: 1
Expiration Date (Optional): 2025-11-30
Additional Comments: Provided patient with instructions to do the next one on his own in 2 weeks.
Lot # (Optional): [id number]
Hide Non-Enhanced Ndc Variable: No
Ndc (300 Mg Prefilled Syringe): 6928-0687-27
Ndc (300 Mg Prefilled Pen): 2915-8647-26
J-Code: 
Administered By (Optional): ELOY
Treatment Number (Optional): 4
Syringe Size Used (Required For Enhanced Ndc): 300 mg/2ml prefilled pen
Ndc (200 Mg Prefilled Pen): 3396-9574-03
Consent: The risks of pain and injection site reactions were reviewed with the patient prior to the injection.
Dupixent Dosing: 300 mg
Ndc (200 Mg Prefilled Syringe): 3632-8182-87
Detail Level: None

## 2024-03-25 NOTE — PROCEDURE: ADDITIONAL NOTES
Detail Level: Simple
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 Started 10/23 ago after being treated w/ abx (amox & cephalexin) and has persisted. bx x 3--urticarial dermatitis x 2 & granulomatous dermatitis (ruptured folliculitis) x 1.\\nDIF neg. BPAgs neg. cbc, cmp, esr/crp, tsh WNL. MARY ANN neg. JULI neg. SPEP/UPEP w/ m spike--heme/onc consult pending. IgE WNL. H pylori breath test negative. complement WNL. Most recent TSH was trending towards hyperthyroidism but he was taking incorrect dosage; pcp is monitoring closely. Awaiting allergy testing (4/16) and eval w/ heme onc (4/6?). \\nOnly mild improvement w/ topical steroids and IMK. No improvement w/ doxy, nbuvb, antihistamines (higher doses), itraconazole, ciprofloxacin ( didhave a bit of pseudomonas on PCR but cxs neg).\\n\\n3/25/24\\nPt states he is at a 3 or 4 on an itch scale. He has not gotten worse but feels he is at the same. currently on dupi; he hasn't started mtx. very urticarial & dermatographic on exam today. \\n\\n\\nPt reports that he has been on thyroid medication for a while, upcoming appointment 3/14\\ndiscussed SPEP irregularity, pt is seeing hematologist 4/4\\nComplains of joint pain (knees and hips while talking dog) and leg muscle cramping since starting dupixent \\nFeels no more than 10% improvement since starting dupixent - chest is better but back isn’t\\nUpon exam, bruising seen \\nWill need workup for multiple myeloma with heme/onc\\nHas been on plavix since 1999 which may be the cause of bruising\\nRecommended seeing Dr. Pagan and getting lab workup\\nDiscussed whether or not to try methotrexate, may impact further testing, wouldn’t need bloodwork to initiate but would need monitoring\\nRash has been impacting pts quality of life, pt is reliant on wife to apply creams\\nPt reports 4-5 bowel movements daily, denies appearance of blood in stool, denies gaining weight - defer management w PCP\\nPt denies any significant change since stopping topical steroids\\nDiscussed CT CAP to ru cancer if further workups are negative
Render Risk Assessment In Note?: no

## 2024-03-25 NOTE — PROCEDURE: PRESCRIPTION MEDICATION MANAGEMENT
Continue Regimen: Cerave itch cream\\ndupi 300 mg q2 wks
Detail Level: Zone
Initiate Treatment: Methotrexate 10mg weekly, folic acid 1mg qd (didn’t take it last visit) (confirmed that this will not impact allergy and heme testing)
Plan: Consider xolair
Render In Strict Bullet Format?: No

## 2024-04-22 ENCOUNTER — APPOINTMENT (RX ONLY)
Dept: URBAN - METROPOLITAN AREA CLINIC 15 | Facility: CLINIC | Age: 89
Setting detail: DERMATOLOGY
End: 2024-04-22

## 2024-04-22 DIAGNOSIS — L30.8 OTHER SPECIFIED DERMATITIS: ICD-10-CM

## 2024-04-22 PROCEDURE — 99214 OFFICE O/P EST MOD 30 MIN: CPT

## 2024-04-22 PROCEDURE — ? COUNSELING

## 2024-04-22 PROCEDURE — ? ADDITIONAL NOTES

## 2024-04-22 PROCEDURE — ? ORDER TESTS

## 2024-04-22 PROCEDURE — ? PRESCRIPTION MEDICATION MANAGEMENT

## 2024-04-22 PROCEDURE — ? MEDICATION COUNSELING

## 2024-04-22 ASSESSMENT — LOCATION SIMPLE DESCRIPTION DERM
LOCATION SIMPLE: CHEST
LOCATION SIMPLE: RIGHT UPPER BACK

## 2024-04-22 ASSESSMENT — LOCATION ZONE DERM: LOCATION ZONE: TRUNK

## 2024-04-22 ASSESSMENT — LOCATION DETAILED DESCRIPTION DERM
LOCATION DETAILED: STERNUM
LOCATION DETAILED: RIGHT SUPERIOR MEDIAL UPPER BACK

## 2024-04-22 NOTE — PROCEDURE: MEDICATION COUNSELING
Terbinafine Counseling: Patient counseling regarding adverse effects of terbinafine including but not limited to headache, diarrhea, rash, upset stomach, liver function test abnormalities, itching, taste/smell disturbance, nausea, abdominal pain, and flatulence.  There is a rare possibility of liver failure that can occur when taking terbinafine.  The patient understands that a baseline LFT and kidney function test may be required. The patient verbalized understanding of the proper use and possible adverse effects of terbinafine.  All of the patient's questions and concerns were addressed.
Klisyri Pregnancy And Lactation Text: It is unknown if this medication can harm a developing fetus or if it is excreted in breast milk.
Minocycline Counseling: Patient advised regarding possible photosensitivity and discoloration of the teeth, skin, lips, tongue and gums.  Patient instructed to avoid sunlight, if possible.  When exposed to sunlight, patients should wear protective clothing, sunglasses, and sunscreen.  The patient was instructed to call the office immediately if the following severe adverse effects occur:  hearing changes, easy bruising/bleeding, severe headache, or vision changes.  The patient verbalized understanding of the proper use and possible adverse effects of minocycline.  All of the patient's questions and concerns were addressed.
Gabapentin Counseling: I discussed with the patient the risks of gabapentin including but not limited to dizziness, somnolence, fatigue and ataxia.
Topical Retinoid counseling:  Patient advised to apply a pea-sized amount only at bedtime and wait 30 minutes after washing their face before applying.  If too drying, patient may add a non-comedogenic moisturizer. The patient verbalized understanding of the proper use and possible adverse effects of retinoids.  All of the patient's questions and concerns were addressed.
Spironolactone Pregnancy And Lactation Text: This medication can cause feminization of the male fetus and should be avoided during pregnancy. The active metabolite is also found in breast milk.
Topical Metronidazole Pregnancy And Lactation Text: This medication is Pregnancy Category B and considered safe during pregnancy.  It is also considered safe to use while breastfeeding.
Niacinamide Pregnancy And Lactation Text: These medications are considered safe during pregnancy.
Humira Counseling:  I discussed with the patient the risks of adalimumab including but not limited to myelosuppression, immunosuppression, autoimmune hepatitis, demyelinating diseases, lymphoma, and serious infections.  The patient understands that monitoring is required including a PPD at baseline and must alert us or the primary physician if symptoms of infection or other concerning signs are noted.
Cyclosporine Pregnancy And Lactation Text: This medication is Pregnancy Category C and it isn't know if it is safe during pregnancy. This medication is excreted in breast milk.
Winlevi Counseling:  I discussed with the patient the risks of topical clascoterone including but not limited to erythema, scaling, itching, and stinging. Patient voiced their understanding.
Opioid Pregnancy And Lactation Text: These medications can lead to premature delivery and should be avoided during pregnancy. These medications are also present in breast milk in small amounts.
Valtrex Counseling: I discussed with the patient the risks of valacyclovir including but not limited to kidney damage, nausea, vomiting and severe allergy.  The patient understands that if the infection seems to be worsening or is not improving, they are to call.
Benzoyl Peroxide Counseling: Patient counseled that medicine may cause skin irritation and bleach clothing.  In the event of skin irritation, the patient was advised to reduce the amount of the drug applied or use it less frequently.   The patient verbalized understanding of the proper use and possible adverse effects of benzoyl peroxide.  All of the patient's questions and concerns were addressed.
Tremfya Pregnancy And Lactation Text: The risk during pregnancy and breastfeeding is uncertain with this medication.
Olumiant Counseling: I discussed with the patient the risks of Olumiant therapy including but not limited to upper respiratory tract infections, shingles, cold sores, and nausea. Live vaccines should be avoided.  This medication has been linked to serious infections; higher rate of mortality; malignancy and lymphoproliferative disorders; major adverse cardiovascular events; thrombosis; gastrointestinal perforations; neutropenia; lymphopenia; anemia; liver enzyme elevations; and lipid elevations.
Cosentyx Counseling:  I discussed with the patient the risks of Cosentyx including but not limited to worsening of Crohn's disease, immunosuppression, allergic reactions and infections.  The patient understands that monitoring is required including a PPD at baseline and must alert us or the primary physician if symptoms of infection or other concerning signs are noted.
Protopic Pregnancy And Lactation Text: This medication is Pregnancy Category C. It is unknown if this medication is excreted in breast milk when applied topically.
Humira Pregnancy And Lactation Text: This medication is Pregnancy Category B and is considered safe during pregnancy. It is unknown if this medication is excreted in breast milk.
Methotrexate Counseling:  Patient counseled regarding adverse effects of methotrexate including but not limited to nausea, vomiting, abnormalities in liver function tests. Patients may develop mouth sores, rash, diarrhea, and abnormalities in blood counts. The patient understands that monitoring is required including LFT's and blood counts.  There is a rare possibility of scarring of the liver and lung problems that can occur when taking methotrexate. Persistent nausea, loss of appetite, pale stools, dark urine, cough, and shortness of breath should be reported immediately. Patient advised to discontinue methotrexate treatment at least three months before attempting to become pregnant.  I discussed the need for folate supplements while taking methotrexate.  These supplements can decrease side effects during methotrexate treatment. The patient verbalized understanding of the proper use and possible adverse effects of methotrexate.  All of the patient's questions and concerns were addressed.
Xolair Counseling:  Patient informed of potential adverse effects including but not limited to fever, muscle aches, rash and allergic reactions.  The patient verbalized understanding of the proper use and possible adverse effects of Xolair.  All of the patient's questions and concerns were addressed.
Isotretinoin Counseling: Patient should get monthly blood tests, not donate blood, not drive at night if vision affected, not share medication, and not undergo elective surgery for 6 months after tx completed. Side effects reviewed, pt to contact office should one occur.
Nsaids Counseling: NSAID Counseling: I discussed with the patient that NSAIDs should be taken with food. Prolonged use of NSAIDs can result in the development of stomach ulcers.  Patient advised to stop taking NSAIDs if abdominal pain occurs.  The patient verbalized understanding of the proper use and possible adverse effects of NSAIDs.  All of the patient's questions and concerns were addressed.
Winlevi Pregnancy And Lactation Text: This medication is considered safe during pregnancy and breastfeeding.
Benzoyl Peroxide Pregnancy And Lactation Text: This medication is Pregnancy Category C. It is unknown if benzoyl peroxide is excreted in breast milk.
Oxybutynin Pregnancy And Lactation Text: This medication is Pregnancy Category B and is considered safe during pregnancy. It is unknown if it is excreted in breast milk.
Simponi Counseling:  I discussed with the patient the risks of golimumab including but not limited to myelosuppression, immunosuppression, autoimmune hepatitis, demyelinating diseases, lymphoma, and serious infections.  The patient understands that monitoring is required including a PPD at baseline and must alert us or the primary physician if symptoms of infection or other concerning signs are noted.
Elidel Counseling: Patient may experience a mild burning sensation during topical application. Elidel is not approved in children less than 2 years of age. There have been case reports of hematologic and skin malignancies in patients using topical calcineurin inhibitors although causality is questionable.
Cephalexin Pregnancy And Lactation Text: This medication is Pregnancy Category B and considered safe during pregnancy.  It is also excreted in breast milk but can be used safely for shorter doses.
Albendazole Pregnancy And Lactation Text: This medication is Pregnancy Category C and it isn't known if it is safe during pregnancy. It is also excreted in breast milk.
Dutasteride Male Counseling: Dustasteride Counseling:  I discussed with the patient the risks of use of dutasteride including but not limited to decreased libido, decreased ejaculate volume, and gynecomastia. Women who can become pregnant should not handle medication.  All of the patient's questions and concerns were addressed.
Gabapentin Pregnancy And Lactation Text: This medication is Pregnancy Category C and isn't considered safe during pregnancy. It is excreted in breast milk.
Minocycline Pregnancy And Lactation Text: This medication is Pregnancy Category D and not consider safe during pregnancy. It is also excreted in breast milk.
Fluconazole Counseling:  Patient counseled regarding adverse effects of fluconazole including but not limited to headache, diarrhea, nausea, upset stomach, liver function test abnormalities, taste disturbance, and stomach pain.  There is a rare possibility of liver failure that can occur when taking fluconazole.  The patient understands that monitoring of LFTs and kidney function test may be required, especially at baseline. The patient verbalized understanding of the proper use and possible adverse effects of fluconazole.  All of the patient's questions and concerns were addressed.
Propranolol Counseling:  I discussed with the patient the risks of propranolol including but not limited to low heart rate, low blood pressure, low blood sugar, restlessness and increased cold sensitivity. They should call the office if they experience any of these side effects.
Elidel Pregnancy And Lactation Text: This medication is Pregnancy Category C. It is unknown if this medication is excreted in breast milk.
Detail Level: Simple
Terbinafine Pregnancy And Lactation Text: This medication is Pregnancy Category B and is considered safe during pregnancy. It is also excreted in breast milk and breast feeding isn't recommended.
Olumiant Pregnancy And Lactation Text: Based on animal studies, Olumiant may cause embryo-fetal harm when administered to pregnant women.  The medication should not be used in pregnancy.  Breastfeeding is not recommended during treatment.
Minoxidil Counseling: Minoxidil is a topical medication which can increase blood flow where it is applied. It is uncertain how this medication increases hair growth. Side effects are uncommon and include stinging and allergic reactions.
Valtrex Pregnancy And Lactation Text: this medication is Pregnancy Category B and is considered safe during pregnancy. This medication is not directly found in breast milk but it's metabolite acyclovir is present.
Rinvoq Counseling: I discussed with the patient the risks of Rinvoq therapy including but not limited to upper respiratory tract infections, shingles, cold sores, bronchitis, nausea, cough, fever, acne, and headache. Live vaccines should be avoided.  This medication has been linked to serious infections; higher rate of mortality; malignancy and lymphoproliferative disorders; major adverse cardiovascular events; thrombosis; thrombocytopenia, anemia, and neutropenia; lipid elevations; liver enzyme elevations; and gastrointestinal perforations.
Isotretinoin Pregnancy And Lactation Text: This medication is Pregnancy Category X and is considered extremely dangerous during pregnancy. It is unknown if it is excreted in breast milk.
VTAMA Counseling: I discussed with the patient that VTAMA is not for use in the eyes, mouth or mouth. They should call the office if they develop any signs of allergic reactions to VTAMA. The patient verbalized understanding of the proper use and possible adverse effects of VTAMA.  All of the patient's questions and concerns were addressed.
Use Enhanced Medication Counseling?: No
Methotrexate Pregnancy And Lactation Text: This medication is Pregnancy Category X and is known to cause fetal harm. This medication is excreted in breast milk.
Carac Counseling:  I discussed with the patient the risks of Carac including but not limited to erythema, scaling, itching, weeping, crusting, and pain.
Xolair Pregnancy And Lactation Text: This medication is Pregnancy Category B and is considered safe during pregnancy. This medication is excreted in breast milk.
Minoxidil Pregnancy And Lactation Text: This medication has not been assigned a Pregnancy Risk Category but animal studies failed to show danger with the topical medication. It is unknown if the medication is excreted in breast milk.
Clindamycin Counseling: I counseled the patient regarding use of clindamycin as an antibiotic for prophylactic and/or therapeutic purposes. Clindamycin is active against numerous classes of bacteria, including skin bacteria. Side effects may include nausea, diarrhea, gastrointestinal upset, rash, hives, yeast infections, and in rare cases, colitis.
Ivermectin Counseling:  Patient instructed to take medication on an empty stomach with a full glass of water.  Patient informed of potential adverse effects including but not limited to nausea, diarrhea, dizziness, itching, and swelling of the extremities or lymph nodes.  The patient verbalized understanding of the proper use and possible adverse effects of ivermectin.  All of the patient's questions and concerns were addressed.
Arava Counseling:  Patient counseled regarding adverse effects of Arava including but not limited to nausea, vomiting, abnormalities in liver function tests. Patients may develop mouth sores, rash, diarrhea, and abnormalities in blood counts. The patient understands that monitoring is required including LFTs and blood counts.  There is a rare possibility of scarring of the liver and lung problems that can occur when taking methotrexate. Persistent nausea, loss of appetite, pale stools, dark urine, cough, and shortness of breath should be reported immediately. Patient advised to discontinue Arava treatment and consult with a physician prior to attempting conception. The patient will have to undergo a treatment to eliminate Arava from the body prior to conception.
Qbrexza Counseling:  I discussed with the patient the risks of Qbrexza including but not limited to headache, mydriasis, blurred vision, dry eyes, nasal dryness, dry mouth, dry throat, dry skin, urinary hesitation, and constipation.  Local skin reactions including erythema, burning, stinging, and itching can also occur.
Ilumya Counseling: I discussed with the patient the risks of tildrakizumab including but not limited to immunosuppression, malignancy, posterior leukoencephalopathy syndrome, and serious infections.  The patient understands that monitoring is required including a PPD at baseline and must alert us or the primary physician if symptoms of infection or other concerning signs are noted.
Nsaids Pregnancy And Lactation Text: These medications are considered safe up to 30 weeks gestation. It is excreted in breast milk.
Dutasteride Female Counseling: Dutasteride Counseling:  I discussed with the patient the risks of use of dutasteride including but not limited to decreased libido and sexual dysfunction. Explained the teratogenic nature of the medication and stressed the importance of not getting pregnant during treatment. All of the patient's questions and concerns were addressed.
Fluconazole Pregnancy And Lactation Text: This medication is Pregnancy Category C and it isn't know if it is safe during pregnancy. It is also excreted in breast milk.
Tazorac Counseling:  Patient advised that medication is irritating and drying.  Patient may need to apply sparingly and wash off after an hour before eventually leaving it on overnight.  The patient verbalized understanding of the proper use and possible adverse effects of tazorac.  All of the patient's questions and concerns were addressed.
Eucrisa Counseling: Patient may experience a mild burning sensation during topical application. Eucrisa is not approved in children less than 3 months of age.
Cimetidine Counseling:  I discussed with the patient the risks of Cimetidine including but not limited to gynecomastia, headache, diarrhea, nausea, drowsiness, arrhythmias, pancreatitis, skin rashes, psychosis, bone marrow suppression and kidney toxicity.
Qbrexza Pregnancy And Lactation Text: There is no available data on Qbrexza use in pregnant women.  There is no available data on Qbrexza use in lactation.
Arava Pregnancy And Lactation Text: This medication is Pregnancy Category X and is absolutely contraindicated during pregnancy. It is unknown if it is excreted in breast milk.
Dutasteride Pregnancy And Lactation Text: This medication is absolutely contraindicated in women, especially during pregnancy and breast feeding. Feminization of male fetuses is possible if taking while pregnant.
Dupixent Counseling: I discussed with the patient the risks of dupilumab including but not limited to eye inflammation and irritation, cold sores, injection site reactions, allergic reactions and increased risk of parasitic infection. The patient understands that monitoring is required and they must alert us or the primary physician if symptoms of infection or other concerning signs are noted.
Glycopyrrolate Counseling:  I discussed with the patient the risks of glycopyrrolate including but not limited to skin rash, drowsiness, dry mouth, difficulty urinating, and blurred vision.
Skyrizi Counseling: I discussed with the patient the risks of risankizumab-rzaa including but not limited to immunosuppression, and serious infections.  The patient understands that monitoring is required including a PPD at baseline and must alert us or the primary physician if symptoms of infection or other concerning signs are noted.
Propranolol Pregnancy And Lactation Text: This medication is Pregnancy Category C and it isn't known if it is safe during pregnancy. It is excreted in breast milk.
Azathioprine Counseling:  I discussed with the patient the risks of azathioprine including but not limited to myelosuppression, immunosuppression, hepatotoxicity, lymphoma, and infections.  The patient understands that monitoring is required including baseline LFTs, Creatinine, possible TPMP genotyping and weekly CBCs for the first month and then every 2 weeks thereafter.  The patient verbalized understanding of the proper use and possible adverse effects of azathioprine.  All of the patient's questions and concerns were addressed.
Quinolones Counseling:  I discussed with the patient the risks of fluoroquinolones including but not limited to GI upset, allergic reaction, drug rash, diarrhea, dizziness, photosensitivity, yeast infections, liver function test abnormalities, tendonitis/tendon rupture.
Mirvaso Counseling: Mirvaso is a topical medication which can decrease superficial blood flow where applied. Side effects are uncommon and include stinging, redness and allergic reactions.
Vtama Pregnancy And Lactation Text: It is unknown if this medication can cause problems during pregnancy and breastfeeding.
Azathioprine Pregnancy And Lactation Text: This medication is Pregnancy Category D and isn't considered safe during pregnancy. It is unknown if this medication is excreted in breast milk.
Topical Steroids Counseling: I discussed with the patient that prolonged use of topical steroids can result in the increased appearance of superficial blood vessels (telangiectasias), lightening (hypopigmentation) and thinning of the skin (atrophy).  Patient understands to avoid using high potency steroids in skin folds, the groin or the face.  The patient verbalized understanding of the proper use and possible adverse effects of topical steroids.  All of the patient's questions and concerns were addressed.
Glycopyrrolate Pregnancy And Lactation Text: This medication is Pregnancy Category B and is considered safe during pregnancy. It is unknown if it is excreted breast milk.
Olanzapine Counseling- I discussed with the patient the common side effects of olanzapine including but are not limited to: lack of energy, dry mouth, increased appetite, sleepiness, tremor, constipation, dizziness, changes in behavior, or restlessness.  Explained that teenagers are more likely to experience headaches, abdominal pain, pain in the arms or legs, tiredness, and sleepiness.  Serious side effects include but are not limited: increased risk of death in elderly patients who are confused, have memory loss, or dementia-related psychosis; hyperglycemia; increased cholesterol and triglycerides; and weight gain.
Sotyktu Pregnancy And Lactation Text: There is insufficient data to evaluate whether or not Sotyktu is safe to use during pregnancy.   It is not known if Sotyktu passes into breast milk and whether or not it is safe to use when breastfeeding.  
Prednisone Counseling:  I discussed with the patient the risks of prolonged use of prednisone including but not limited to weight gain, insomnia, osteoporosis, mood changes, diabetes, susceptibility to infection, glaucoma and high blood pressure.  In cases where prednisone use is prolonged, patients should be monitored with blood pressure checks, serum glucose levels and an eye exam.  Additionally, the patient may need to be placed on GI prophylaxis, PCP prophylaxis, and calcium and vitamin D supplementation and/or a bisphosphonate.  The patient verbalized understanding of the proper use and the possible adverse effects of prednisone.  All of the patient's questions and concerns were addressed.
Clindamycin Pregnancy And Lactation Text: This medication can be used in pregnancy if certain situations. Clindamycin is also present in breast milk.
High Dose Vitamin A Counseling: Side effects reviewed, pt to contact office should one occur.
Carac Pregnancy And Lactation Text: This medication is Pregnancy Category X and contraindicated in pregnancy and in women who may become pregnant. It is unknown if this medication is excreted in breast milk.
Rinvoq Pregnancy And Lactation Text: Based on animal studies, Rinvoq may cause embryo-fetal harm when administered to pregnant women.  The medication should not be used in pregnancy.  Breastfeeding is not recommended during treatment and for 6 days after the last dose.
Clofazimine Counseling:  I discussed with the patient the risks of clofazimine including but not limited to skin and eye pigmentation, liver damage, nausea/vomiting, gastrointestinal bleeding and allergy.
Xeljanz Counseling: I discussed with the patient the risks of Xeljanz therapy including increased risk of infection, liver issues, headache, diarrhea, or cold symptoms. Live vaccines should be avoided. They were instructed to call if they have any problems.
Infliximab Counseling:  I discussed with the patient the risks of infliximab including but not limited to myelosuppression, immunosuppression, autoimmune hepatitis, demyelinating diseases, lymphoma, and serious infections.  The patient understands that monitoring is required including a PPD at baseline and must alert us or the primary physician if symptoms of infection or other concerning signs are noted.
Finasteride Male Counseling: Finasteride Counseling:  I discussed with the patient the risks of use of finasteride including but not limited to decreased libido, decreased ejaculate volume, gynecomastia, and depression. Women should not handle medication.  All of the patient's questions and concerns were addressed.
Doxycycline Counseling:  Patient counseled regarding possible photosensitivity and increased risk for sunburn.  Patient instructed to avoid sunlight, if possible.  When exposed to sunlight, patients should wear protective clothing, sunglasses, and sunscreen.  The patient was instructed to call the office immediately if the following severe adverse effects occur:  hearing changes, easy bruising/bleeding, severe headache, or vision changes.  The patient verbalized understanding of the proper use and possible adverse effects of doxycycline.  All of the patient's questions and concerns were addressed.
Sotyktu Counseling:  I discussed the most common side effects of Sotyktu including: common cold, sore throat, sinus infections, cold sores, canker sores, folliculitis, and acne.  I also discussed more serious side effects of Sotyktu including but not limited to: serious allergic reactions; increased risk for infections such as TB; cancers such as lymphomas; rhabdomyolysis and elevated CPK; and elevated triglycerides and liver enzymes. 
High Dose Vitamin A Pregnancy And Lactation Text: High dose vitamin A therapy is contraindicated during pregnancy and breast feeding.
Olanzapine Pregnancy And Lactation Text: This medication is pregnancy category C.   There are no adequate and well controlled trials with olanzapine in pregnant females.  Olanzapine should be used during pregnancy only if the potential benefit justifies the potential risk to the fetus.   In a study in lactating healthy women, olanzapine was excreted in breast milk.  It is recommended that women taking olanzapine should not breast feed.
Rhofade Counseling: Rhofade is a topical medication which can decrease superficial blood flow where applied. Side effects are uncommon and include stinging, redness and allergic reactions.
Calcipotriene Counseling:  I discussed with the patient the risks of calcipotriene including but not limited to erythema, scaling, itching, and irritation.
Griseofulvin Counseling:  I discussed with the patient the risks of griseofulvin including but not limited to photosensitivity, cytopenia, liver damage, nausea/vomiting and severe allergy.  The patient understands that this medication is best absorbed when taken with a fatty meal (e.g., ice cream or french fries).
SSKI Counseling:  I discussed with the patient the risks of SSKI including but not limited to thyroid abnormalities, metallic taste, GI upset, fever, headache, acne, arthralgias, paraesthesias, lymphadenopathy, easy bleeding, arrhythmias, and allergic reaction.
Tazorac Pregnancy And Lactation Text: This medication is not safe during pregnancy. It is unknown if this medication is excreted in breast milk.
Erivedge Counseling- I discussed with the patient the risks of Erivedge including but not limited to nausea, vomiting, diarrhea, constipation, weight loss, changes in the sense of taste, decreased appetite, muscle spasms, and hair loss.  The patient verbalized understanding of the proper use and possible adverse effects of Erivedge.  All of the patient's questions and concerns were addressed.
Hydroxychloroquine Counseling:  I discussed with the patient that a baseline ophthalmologic exam is needed at the start of therapy and every year thereafter while on therapy. A CBC may also be warranted for monitoring.  The side effects of this medication were discussed with the patient, including but not limited to agranulocytosis, aplastic anemia, seizures, rashes, retinopathy, and liver toxicity. Patient instructed to call the office should any adverse effect occur.  The patient verbalized understanding of the proper use and possible adverse effects of Plaquenil.  All the patient's questions and concerns were addressed.
Adbry Counseling: I discussed with the patient the risks of tralokinumab including but not limited to eye infection and irritation, cold sores, injection site reactions, worsening of asthma, allergic reactions and increased risk of parasitic infection.  Live vaccines should be avoided while taking tralokinumab. The patient understands that monitoring is required and they must alert us or the primary physician if symptoms of infection or other concerning signs are noted.
Cellcept Counseling:  I discussed with the patient the risks of mycophenolate mofetil including but not limited to infection/immunosuppression, GI upset, hypokalemia, hypercholesterolemia, bone marrow suppression, lymphoproliferative disorders, malignancy, GI ulceration/bleed/perforation, colitis, interstitial lung disease, kidney failure, progressive multifocal leukoencephalopathy, and birth defects.  The patient understands that monitoring is required including a baseline creatinine and regular CBC testing. In addition, patient must alert us immediately if symptoms of infection or other concerning signs are noted.
Rifampin Counseling: I discussed with the patient the risks of rifampin including but not limited to liver damage, kidney damage, red-orange body fluids, nausea/vomiting and severe allergy.
Topical Clindamycin Counseling: Patient counseled that this medication may cause skin irritation or allergic reactions.  In the event of skin irritation, the patient was advised to reduce the amount of the drug applied or use it less frequently.   The patient verbalized understanding of the proper use and possible adverse effects of clindamycin.  All of the patient's questions and concerns were addressed.
Calcipotriene Pregnancy And Lactation Text: The use of this medication during pregnancy or lactation is not recommended as there is insufficient data.
Griseofulvin Pregnancy And Lactation Text: This medication is Pregnancy Category X and is known to cause serious birth defects. It is unknown if this medication is excreted in breast milk but breast feeding should be avoided.
Doxepin Counseling:  Patient advised that the medication is sedating and not to drive a car after taking this medication. Patient informed of potential adverse effects including but not limited to dry mouth, urinary retention, and blurry vision.  The patient verbalized understanding of the proper use and possible adverse effects of doxepin.  All of the patient's questions and concerns were addressed.
Sski Pregnancy And Lactation Text: This medication is Pregnancy Category D and isn't considered safe during pregnancy. It is excreted in breast milk.
Hydroquinone Counseling:  Patient advised that medication may result in skin irritation, lightening (hypopigmentation), dryness, and burning.  In the event of skin irritation, the patient was advised to reduce the amount of the drug applied or use it less frequently.  Rarely, spots that are treated with hydroquinone can become darker (pseudoochronosis).  Should this occur, patient instructed to stop medication and call the office. The patient verbalized understanding of the proper use and possible adverse effects of hydroquinone.  All of the patient's questions and concerns were addressed.
Topical Steroids Applications Pregnancy And Lactation Text: Most topical steroids are considered safe to use during pregnancy and lactation.  Any topical steroid applied to the breast or nipple should be washed off before breastfeeding.
Mirvaso Pregnancy And Lactation Text: This medication has not been assigned a Pregnancy Risk Category. It is unknown if the medication is excreted in breast milk.
Zoryve Counseling:  I discussed with the patient that Zoryve is not for use in the eyes, mouth or vagina. The most commonly reported side effects include diarrhea, headache, insomnia, application site pain, upper respiratory tract infections, and urinary tract infections.  All of the patient's questions and concerns were addressed.
Cantharidin Counseling:  I discussed with the patient the risks of Cantharidin including but not limited to pain, redness, burning, itching, and blistering.
Stelara Counseling:  I discussed with the patient the risks of ustekinumab including but not limited to immunosuppression, malignancy, posterior leukoencephalopathy syndrome, and serious infections.  The patient understands that monitoring is required including a PPD at baseline and must alert us or the primary physician if symptoms of infection or other concerning signs are noted.
Xelarenz Pregnancy And Lactation Text: This medication is Pregnancy Category D and is not considered safe during pregnancy.  The risk during breast feeding is also uncertain.
Oral Minoxidil Counseling- I discussed with the patient the risks of oral minoxidil including but not limited to shortness of breath, swelling of the feet or ankles, dizziness, lightheadedness, unwanted hair growth and allergic reaction.  The patient verbalized understanding of the proper use and possible adverse effects of oral minoxidil.  All of the patient's questions and concerns were addressed.
Finasteride Female Counseling: Finasteride Counseling:  I discussed with the patient the risks of use of finasteride including but not limited to decreased libido and sexual dysfunction. Explained the teratogenic nature of the medication and stressed the importance of not getting pregnant during treatment. All of the patient's questions and concerns were addressed.
Doxycycline Pregnancy And Lactation Text: This medication is Pregnancy Category D and not consider safe during pregnancy. It is also excreted in breast milk but is considered safe for shorter treatment courses.
Topical Sulfur Applications Counseling: Topical Sulfur Counseling: Patient counseled that this medication may cause skin irritation or allergic reactions.  In the event of skin irritation, the patient was advised to reduce the amount of the drug applied or use it less frequently.   The patient verbalized understanding of the proper use and possible adverse effects of topical sulfur application.  All of the patient's questions and concerns were addressed.
Doxepin Pregnancy And Lactation Text: This medication is Pregnancy Category C and it isn't known if it is safe during pregnancy. It is also excreted in breast milk and breast feeding isn't recommended.
Finasteride Pregnancy And Lactation Text: This medication is absolutely contraindicated during pregnancy. It is unknown if it is excreted in breast milk.
Thalidomide Counseling: I discussed with the patient the risks of thalidomide including but not limited to birth defects, anxiety, weakness, chest pain, dizziness, cough and severe allergy.
Libtayo Counseling- I discussed with the patient the risks of Libtayo including but not limited to nausea, vomiting, diarrhea, and bone or muscle pain.  The patient verbalized understanding of the proper use and possible adverse effects of Libtayo.  All of the patient's questions and concerns were addressed.
Colchicine Counseling:  Patient counseled regarding adverse effects including but not limited to stomach upset (nausea, vomiting, stomach pain, or diarrhea).  Patient instructed to limit alcohol consumption while taking this medication.  Colchicine may reduce blood counts especially with prolonged use.  The patient understands that monitoring of kidney function and blood counts may be required, especially at baseline. The patient verbalized understanding of the proper use and possible adverse effects of colchicine.  All of the patient's questions and concerns were addressed.
Hydroxychloroquine Pregnancy And Lactation Text: This medication has been shown to cause fetal harm but it isn't assigned a Pregnancy Risk Category. There are small amounts excreted in breast milk.
Azithromycin Counseling:  I discussed with the patient the risks of azithromycin including but not limited to GI upset, allergic reaction, drug rash, diarrhea, and yeast infections.
Opzelura Counseling:  I discussed with the patient the risks of Opzelura including but not limited to nasopharngitis, bronchitis, ear infection, eosinophila, hives, diarrhea, folliculitis, tonsillitis, and rhinorrhea.  Taken orally, this medication has been linked to serious infections; higher rate of mortality; malignancy and lymphoproliferative disorders; major adverse cardiovascular events; thrombosis; thrombocytopenia, anemia, and neutropenia; and lipid elevations.
Aklief counseling:  Patient advised to apply a pea-sized amount only at bedtime and wait 30 minutes after washing their face before applying.  If too drying, patient may add a non-comedogenic moisturizer.  The most commonly reported side effects including irritation, redness, scaling, dryness, stinging, burning, itching, and increased risk of sunburn.  The patient verbalized understanding of the proper use and possible adverse effects of retinoids.  All of the patient's questions and concerns were addressed.
Rifampin Pregnancy And Lactation Text: This medication is Pregnancy Category C and it isn't know if it is safe during pregnancy. It is also excreted in breast milk and should not be used if you are breast feeding.
Adbry Pregnancy And Lactation Text: It is unknown if this medication will adversely affect pregnancy or breast feeding.
Cantharidin Pregnancy And Lactation Text: This medication has not been proven safe during pregnancy. It is unknown if this medication is excreted in breast milk.
Itraconazole Counseling:  I discussed with the patient the risks of itraconazole including but not limited to liver damage, nausea/vomiting, neuropathy, and severe allergy.  The patient understands that this medication is best absorbed when taken with acidic beverages such as non-diet cola or ginger ale.  The patient understands that monitoring is required including baseline LFTs and repeat LFTs at intervals.  The patient understands that they are to contact us or the primary physician if concerning signs are noted.
Cibinqo Counseling: I discussed with the patient the risks of Cibinqo therapy including but not limited to common cold, nausea, headache, cold sores, increased blood CPK levels, dizziness, UTIs, fatigue, acne, and vomitting. Live vaccines should be avoided.  This medication has been linked to serious infections; higher rate of mortality; malignancy and lymphoproliferative disorders; major adverse cardiovascular events; thrombosis; thrombocytopenia and lymphopenia; lipid elevations; and retinal detachment.
Zyclara Counseling:  I discussed with the patient the risks of imiquimod including but not limited to erythema, scaling, itching, weeping, crusting, and pain.  Patient understands that the inflammatory response to imiquimod is variable from person to person and was educated regarded proper titration schedule.  If flu-like symptoms develop, patient knows to discontinue the medication and contact us.
Azithromycin Pregnancy And Lactation Text: This medication is considered safe during pregnancy and is also secreted in breast milk.
Cyclophosphamide Counseling:  I discussed with the patient the risks of cyclophosphamide including but not limited to hair loss, hormonal abnormalities, decreased fertility, abdominal pain, diarrhea, nausea and vomiting, bone marrow suppression and infection. The patient understands that monitoring is required while taking this medication.
Acitretin Counseling:  I discussed with the patient the risks of acitretin including but not limited to hair loss, dry lips/skin/eyes, liver damage, hyperlipidemia, depression/suicidal ideation, photosensitivity.  Serious rare side effects can include but are not limited to pancreatitis, pseudotumor cerebri, bony changes, clot formation/stroke/heart attack.  Patient understands that alcohol is contraindicated since it can result in liver toxicity and significantly prolong the elimination of the drug by many years.
Bimzelx Counseling:  I discussed with the patient the risks of Bimzelx including but not limited to depression, immunosuppression, allergic reactions and infections.  The patient understands that monitoring is required including a PPD at baseline and must alert us or the primary physician if symptoms of infection or other concerning signs are noted.
Opzelura Pregnancy And Lactation Text: There is insufficient data to evaluate drug-associated risk for major birth defects, miscarriage, or other adverse maternal or fetal outcomes.  There is a pregnancy registry that monitors pregnancy outcomes in pregnant persons exposed to the medication during pregnancy.  It is unknown if this medication is excreted in breast milk.  Do not breastfeed during treatment and for about 4 weeks after the last dose.
Aklief Pregnancy And Lactation Text: It is unknown if this medication is safe to use during pregnancy.  It is unknown if this medication is excreted in breast milk.  Breastfeeding women should use the topical cream on the smallest area of the skin for the shortest time needed while breastfeeding.  Do not apply to nipple and areola.
Low Dose Naltrexone Counseling- I discussed with the patient the potential risks and side effects of low dose naltrexone including but not limited to: more vivid dreams, headaches, nausea, vomiting, abdominal pain, fatigue, dizziness, and anxiety.
Erythromycin Counseling:  I discussed with the patient the risks of erythromycin including but not limited to GI upset, allergic reaction, drug rash, diarrhea, increase in liver enzymes, and yeast infections.
Solaraze Counseling:  I discussed with the patient the risks of Solaraze including but not limited to erythema, scaling, itching, weeping, crusting, and pain.
Oral Minoxidil Pregnancy And Lactation Text: This medication should only be used when clearly needed if you are pregnant, attempting to become pregnant or breast feeding.
5-Fu Counseling: 5-Fluorouracil Counseling:  I discussed with the patient the risks of 5-fluorouracil including but not limited to erythema, scaling, itching, weeping, crusting, and pain.
Rituxan Counseling:  I discussed with the patient the risks of Rituxan infusions. Side effects can include infusion reactions, severe drug rashes including mucocutaneous reactions, reactivation of latent hepatitis and other infections and rarely progressive multifocal leukoencephalopathy.  All of the patient's questions and concerns were addressed.
Imiquimod Counseling:  I discussed with the patient the risks of imiquimod including but not limited to erythema, scaling, itching, weeping, crusting, and pain.  Patient understands that the inflammatory response to imiquimod is variable from person to person and was educated regarded proper titration schedule.  If flu-like symptoms develop, patient knows to discontinue the medication and contact us.
Birth Control Pills Counseling: Birth Control Pill Counseling: I discussed with the patient the potential side effects of OCPs including but not limited to increased risk of stroke, heart attack, thrombophlebitis, deep venous thrombosis, hepatic adenomas, breast changes, GI upset, headaches, and depression.  The patient verbalized understanding of the proper use and possible adverse effects of OCPs. All of the patient's questions and concerns were addressed.
Hydroxyzine Counseling: Patient advised that the medication is sedating and not to drive a car after taking this medication.  Patient informed of potential adverse effects including but not limited to dry mouth, urinary retention, and blurry vision.  The patient verbalized understanding of the proper use and possible adverse effects of hydroxyzine.  All of the patient's questions and concerns were addressed.
Erythromycin Pregnancy And Lactation Text: This medication is Pregnancy Category B and is considered safe during pregnancy. It is also excreted in breast milk.
Solaraze Pregnancy And Lactation Text: This medication is Pregnancy Category B and is considered safe. There is some data to suggest avoiding during the third trimester. It is unknown if this medication is excreted in breast milk.
Sarecycline Counseling: Patient advised regarding possible photosensitivity and discoloration of the teeth, skin, lips, tongue and gums.  Patient instructed to avoid sunlight, if possible.  When exposed to sunlight, patients should wear protective clothing, sunglasses, and sunscreen.  The patient was instructed to call the office immediately if the following severe adverse effects occur:  hearing changes, easy bruising/bleeding, severe headache, or vision changes.  The patient verbalized understanding of the proper use and possible adverse effects of sarecycline.  All of the patient's questions and concerns were addressed.
Dupixent Pregnancy And Lactation Text: This medication likely crosses the placenta but the risk for the fetus is uncertain. This medication is excreted in breast milk.
Topical Ketoconazole Counseling: Patient counseled that this medication may cause skin irritation or allergic reactions.  In the event of skin irritation, the patient was advised to reduce the amount of the drug applied or use it less frequently.   The patient verbalized understanding of the proper use and possible adverse effects of ketoconazole.  All of the patient's questions and concerns were addressed.
Taltz Counseling: I discussed with the patient the risks of ixekizumab including but not limited to immunosuppression, serious infections, worsening of inflammatory bowel disease and drug reactions.  The patient understands that monitoring is required including a PPD at baseline and must alert us or the primary physician if symptoms of infection or other concerning signs are noted.
Cibinqo Pregnancy And Lactation Text: It is unknown if this medication will adversely affect pregnancy or breast feeding.  You should not take this medication if you are currently pregnant or planning a pregnancy or while breastfeeding.
Libtayo Pregnancy And Lactation Text: This medication is contraindicated in pregnancy and when breast feeding.
Topical Sulfur Applications Pregnancy And Lactation Text: This medication is considered safe during pregnancy and breast feeding secondary to limited systemic absorption.
Bimzelx Pregnancy And Lactation Text: This medication crosses the placenta and the safety is uncertain during pregnancy. It is unknown if this medication is present in breast milk.
Azelaic Acid Counseling: Patient counseled that medicine may cause skin irritation and to avoid applying near the eyes.  In the event of skin irritation, the patient was advised to reduce the amount of the drug applied or use it less frequently.   The patient verbalized understanding of the proper use and possible adverse effects of azelaic acid.  All of the patient's questions and concerns were addressed.
Picato Counseling:  I discussed with the patient the risks of Picato including but not limited to erythema, scaling, itching, weeping, crusting, and pain.
Enbrel Counseling:  I discussed with the patient the risks of etanercept including but not limited to myelosuppression, immunosuppression, autoimmune hepatitis, demyelinating diseases, lymphoma, and infections.  The patient understands that monitoring is required including a PPD at baseline and must alert us or the primary physician if symptoms of infection or other concerning signs are noted.
Ketoconazole Counseling:   Patient counseled regarding improving absorption with orange juice.  Adverse effects include but are not limited to breast enlargement, headache, diarrhea, nausea, upset stomach, liver function test abnormalities, taste disturbance, and stomach pain.  There is a rare possibility of liver failure that can occur when taking ketoconazole. The patient understands that monitoring of LFTs may be required, especially at baseline. The patient verbalized understanding of the proper use and possible adverse effects of ketoconazole.  All of the patient's questions and concerns were addressed.
Cyclophosphamide Pregnancy And Lactation Text: This medication is Pregnancy Category D and it isn't considered safe during pregnancy. This medication is excreted in breast milk.
Wartpeel Counseling:  I discussed with the patient the risks of Wartpeel including but not limited to erythema, scaling, itching, weeping, crusting, and pain.
Otezla Counseling: The side effects of Otezla were discussed with the patient, including but not limited to worsening or new depression, weight loss, diarrhea, nausea, upper respiratory tract infection, and headache. Patient instructed to call the office should any adverse effect occur.  The patient verbalized understanding of the proper use and possible adverse effects of Otezla.  All the patient's questions and concerns were addressed.
Rituxan Pregnancy And Lactation Text: This medication is Pregnancy Category C and it isn't know if it is safe during pregnancy. It is unknown if this medication is excreted in breast milk but similar antibodies are known to be excreted.
Low Dose Naltrexone Pregnancy And Lactation Text: Naltrexone is pregnancy category C.  There have been no adequate and well-controlled studies in pregnant women.  It should be used in pregnancy only if the potential benefit justifies the potential risk to the fetus.   Limited data indicates that naltrexone is minimally excreted into breastmilk.
Bactrim Counseling:  I discussed with the patient the risks of sulfa antibiotics including but not limited to GI upset, allergic reaction, drug rash, diarrhea, dizziness, photosensitivity, and yeast infections.  Rarely, more serious reactions can occur including but not limited to aplastic anemia, agranulocytosis, methemoglobinemia, blood dyscrasias, liver or kidney failure, lung infiltrates or desquamative/blistering drug rashes.
Acitretin Pregnancy And Lactation Text: This medication is Pregnancy Category X and should not be given to women who are pregnant or may become pregnant in the future. This medication is excreted in breast milk.
Soolantra Counseling: I discussed with the patients the risks of topial Soolantra. This is a medicine which decreases the number of mites and inflammation in the skin. You experience burning, stinging, eye irritation or allergic reactions.  Please call our office if you develop any problems from using this medication.
Siliq Counseling:  I discussed with the patient the risks of Siliq including but not limited to new or worsening depression, suicidal thoughts and behavior, immunosuppression, malignancy, posterior leukoencephalopathy syndrome, and serious infections.  The patient understands that monitoring is required including a PPD at baseline and must alert us or the primary physician if symptoms of infection or other concerning signs are noted. There is also a special program designed to monitor depression which is required with Siliq.
Otezla Pregnancy And Lactation Text: This medication is Pregnancy Category C and it isn't known if it is safe during pregnancy. It is unknown if it is excreted in breast milk.
Drysol Counseling:  I discussed with the patient the risks of drysol/aluminum chloride including but not limited to skin rash, itching, irritation, burning.
Dapsone Counseling: I discussed with the patient the risks of dapsone including but not limited to hemolytic anemia, agranulocytosis, rashes, methemoglobinemia, kidney failure, peripheral neuropathy, headaches, GI upset, and liver toxicity.  Patients who start dapsone require monitoring including baseline LFTs and weekly CBCs for the first month, then every month thereafter.  The patient verbalized understanding of the proper use and possible adverse effects of dapsone.  All of the patient's questions and concerns were addressed.
Birth Control Pills Pregnancy And Lactation Text: This medication should be avoided if pregnant and for the first 30 days post-partum.
Litfulo Counseling: I discussed with the patient the risks of Litfulo therapy including but not limited to upper respiratory tract infections, shingles, cold sores, and nausea. Live vaccines should be avoided.  This medication has been linked to serious infections; higher rate of mortality; malignancy and lymphoproliferative disorders; major adverse cardiovascular events; thrombosis; gastrointestinal perforations; neutropenia; lymphopenia; anemia; liver enzyme elevations; and lipid elevations.
Tranexamic Acid Counseling:  Patient advised of the small risk of bleeding problems with tranexamic acid. They were also instructed to call if they developed any nausea, vomiting or diarrhea. All of the patient's questions and concerns were addressed.
Odomzo Counseling- I discussed with the patient the risks of Odomzo including but not limited to nausea, vomiting, diarrhea, constipation, weight loss, changes in the sense of taste, decreased appetite, muscle spasms, and hair loss.  The patient verbalized understanding of the proper use and possible adverse effects of Odomzo.  All of the patient's questions and concerns were addressed.
Hydroxyzine Pregnancy And Lactation Text: This medication is not safe during pregnancy and should not be taken. It is also excreted in breast milk and breast feeding isn't recommended.
Metronidazole Counseling:  I discussed with the patient the risks of metronidazole including but not limited to seizures, nausea/vomiting, a metallic taste in the mouth, nausea/vomiting and severe allergy.
Tremfya Counseling: I discussed with the patient the risks of guselkumab including but not limited to immunosuppression, serious infections, and drug reactions.  The patient understands that monitoring is required including a PPD at baseline and must alert us or the primary physician if symptoms of infection or other concerning signs are noted.
Litfulo Pregnancy And Lactation Text: Based on animal studies, Lifulo may cause embryo-fetal harm when administered to pregnant women.  The medication should not be used in pregnancy.  Breastfeeding is not recommended during treatment.
Niacinamide Counseling: I recommended taking niacin or niacinamide, also know as vitamin B3, twice daily. Recent evidence suggests that taking vitamin B3 (500 mg twice daily) can reduce the risk of actinic keratoses and non-melanoma skin cancers. Side effects of vitamin B3 include flushing and headache.
Opioid Counseling: I discussed with the patient the potential side effects of opioids including but not limited to addiction, altered mental status, and depression. I stressed avoiding alcohol, benzodiazepines, muscle relaxants and sleep aids unless specifically okayed by a physician. The patient verbalized understanding of the proper use and possible adverse effects of opioids. All of the patient's questions and concerns were addressed. They were instructed to flush the remaining pills down the toilet if they did not need them for pain.
Cyclosporine Counseling:  I discussed with the patient the risks of cyclosporine including but not limited to hypertension, gingival hyperplasia,myelosuppression, immunosuppression, liver damage, kidney damage, neurotoxicity, lymphoma, and serious infections. The patient understands that monitoring is required including baseline blood pressure, CBC, CMP, lipid panel and uric acid, and then 1-2 times monthly CMP and blood pressure.
Spironolactone Counseling: Patient advised regarding risks of diarrhea, abdominal pain, hyperkalemia, birth defects (for female patients), liver toxicity and renal toxicity. The patient may need blood work to monitor liver and kidney function and potassium levels while on therapy. The patient verbalized understanding of the proper use and possible adverse effects of spironolactone.  All of the patient's questions and concerns were addressed.
Ketoconazole Pregnancy And Lactation Text: This medication is Pregnancy Category C and it isn't know if it is safe during pregnancy. It is also excreted in breast milk and breast feeding isn't recommended.
Klisyri Counseling:  I discussed with the patient the risks of Klisyri including but not limited to erythema, scaling, itching, weeping, crusting, and pain.
Tranexamic Acid Pregnancy And Lactation Text: It is unknown if this medication is safe during pregnancy or breast feeding.
Dapsone Pregnancy And Lactation Text: This medication is Pregnancy Category C and is not considered safe during pregnancy or breast feeding.
Bactrim Pregnancy And Lactation Text: This medication is Pregnancy Category D and is known to cause fetal risk.  It is also excreted in breast milk.
Bexarotene Counseling:  I discussed with the patient the risks of bexarotene including but not limited to hair loss, dry lips/skin/eyes, liver abnormalities, hyperlipidemia, pancreatitis, depression/suicidal ideation, photosensitivity, drug rash/allergic reactions, hypothyroidism, anemia, leukopenia, infection, cataracts, and teratogenicity.  Patient understands that they will need regular blood tests to check lipid profile, liver function tests, white blood cell count, thyroid function tests and pregnancy test if applicable.
Azelaic Acid Pregnancy And Lactation Text: This medication is considered safe during pregnancy and breast feeding.
Cimzia Counseling:  I discussed with the patient the risks of Cimzia including but not limited to immunosuppression, allergic reactions and infections.  The patient understands that monitoring is required including a PPD at baseline and must alert us or the primary physician if symptoms of infection or other concerning signs are noted.
Tetracycline Counseling: Patient counseled regarding possible photosensitivity and increased risk for sunburn.  Patient instructed to avoid sunlight, if possible.  When exposed to sunlight, patients should wear protective clothing, sunglasses, and sunscreen.  The patient was instructed to call the office immediately if the following severe adverse effects occur:  hearing changes, easy bruising/bleeding, severe headache, or vision changes.  The patient verbalized understanding of the proper use and possible adverse effects of tetracycline.  All of the patient's questions and concerns were addressed. Patient understands to avoid pregnancy while on therapy due to potential birth defects.
Topical Metronidazole Counseling: Metronidazole is a topical antibiotic medication. You may experience burning, stinging, redness, or allergic reactions.  Please call our office if you develop any problems from using this medication.
Cephalexin Counseling: I counseled the patient regarding use of cephalexin as an antibiotic for prophylactic and/or therapeutic purposes. Cephalexin (commonly prescribed under brand name Keflex) is a cephalosporin antibiotic which is active against numerous classes of bacteria, including most skin bacteria. Side effects may include nausea, diarrhea, gastrointestinal upset, rash, hives, yeast infections, and in rare cases, hepatitis, kidney disease, seizures, fever, confusion, neurologic symptoms, and others. Patients with severe allergies to penicillin medications are cautioned that there is about a 10% incidence of cross-reactivity with cephalosporins. When possible, patients with penicillin allergies should use alternatives to cephalosporins for antibiotic therapy.
Bexarotene Pregnancy And Lactation Text: This medication is Pregnancy Category X and should not be given to women who are pregnant or may become pregnant. This medication should not be used if you are breast feeding.
Protopic Counseling: Patient may experience a mild burning sensation during topical application. Protopic is not approved in children less than 2 years of age. There have been case reports of hematologic and skin malignancies in patients using topical calcineurin inhibitors although causality is questionable.
Albendazole Counseling:  I discussed with the patient the risks of albendazole including but not limited to cytopenia, kidney damage, nausea/vomiting and severe allergy.  The patient understands that this medication is being used in an off-label manner.
Cimzia Pregnancy And Lactation Text: This medication crosses the placenta but can be considered safe in certain situations. Cimzia may be excreted in breast milk.
Metronidazole Pregnancy And Lactation Text: This medication is Pregnancy Category B and considered safe during pregnancy.  It is also excreted in breast milk.
Soolantra Pregnancy And Lactation Text: This medication is Pregnancy Category C. This medication is considered safe during breast feeding.
Oxybutynin Counseling:  I discussed with the patient the risks of oxybutynin including but not limited to skin rash, drowsiness, dry mouth, difficulty urinating, and blurred vision.
Hyrimoz Counseling:  I discussed with the patient the risks of adalimumab including but not limited to myelosuppression, immunosuppression, autoimmune hepatitis, demyelinating diseases, lymphoma, and serious infections.  The patient understands that monitoring is required including a PPD at baseline and must alert us or the primary physician if symptoms of infection or other concerning signs are noted.

## 2024-04-22 NOTE — PROCEDURE: PRESCRIPTION MEDICATION MANAGEMENT
Continue Regimen: folic acid 1mg qd\\nCerave itch cream
Discontinue Regimen: Dupixent
Detail Level: Zone
Initiate Treatment: Increase Methotrexate 6 pills once a week (15 mg wkly)
Render In Strict Bullet Format?: No

## 2024-04-22 NOTE — PROCEDURE: ORDER TESTS
Bill For Surgical Tray: no
Expected Date Of Service: 04/22/2024
Performing Laboratory: 0
Billing Type: Third-Party Bill

## 2024-04-22 NOTE — PROCEDURE: ADDITIONAL NOTES
Detail Level: Simple
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 Started 10/23 ago after being treated w/ abx (amox & cephalexin) and has persisted. bx x 3--urticarial dermatitis x 2 & granulomatous dermatitis (ruptured folliculitis) x 1.\\nDIF neg. BPAgs neg. cbc, cmp, esr/crp, tsh WNL. MARY ANN neg. JULI neg. SPEP/UPEP w/ m spike--heme/onc consulted & per pt, they are not concerned for evolving myeloma or malignancy. IgE WNL. H pylori breath test negative. complement WNL. Most recent TSH was trending towards hyperthyroidism but he was taking incorrect dosage; pcp is monitoring closely. allergy testing (4/16)-per patient, only notable for potential new apple allergy but he's avoiding t his & hasn't noticed any big changes. \\n\\nOnly mild improvement w/ topical steroids and IMK. No improvement w/ doxy, nbuvb, antihistamines (higher doses), itraconazole, ciprofloxacin (did have a bit of pseudomonas on PCR but cxs neg).\\n\\n4/22/24\\nFavor persistent reaction to drug. Since he is doing well we won’t do Ct but discussed at appointment. 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 He also got blood work done and we will get records. The triamcinolone ointment has been working for him the best. Dr. Tobar said that there were no signs of cancer and didn’t need any other testing. Methotrexate helped get it a lot better he was taking 4 a week. Side effects of methotrexate were upset stomach but he has been dealing with an upset stomach for a while. Dupixent bothered him and made his knees and hips hurt. \\n\\renee this point, we have done an exhaustive w/u that has been largely neg. the only thing we haven't done that could be considered is ct c/a/p to r/o an occult malignancy but his ROS is reassuring and he'd prefer to avoid. in addition, he's feeling encouraged since he has been noticing an improvement so we will hold on this. we will get repeat lfts & as long as ok, will have him inc to 15 mg weekly. triam prn. \\n\\n\\n3/25/24\\nPt states he is at a 3 or 4 on an itch scale. He has not gotten worse but feels he is at the same. currently on dupi; he hasn't started mtx. very urticarial & dermatographic on exam today. \\n\\n\\nPt reports that he has been on thyroid medication for a while, upcoming appointment 3/14\\ndiscussed SPEP irregularity, pt is seeing hematologist 4/4\\nComplains of joint pain (knees and hips while talking dog) and leg muscle cramping since starting dupixent \\nFeels no more than 10% improvement since starting dupixent - chest is better but back isn’t\\nUpon exam, bruising seen \\nWill need workup for multiple myeloma with heme/onc\\nHas been on plavix since 1999 which may be the cause of bruising\\nRecommended seeing Dr. Pagan and getting lab workup\\nDiscussed whether or not to try methotrexate, may impact further testing, wouldn’t need bloodwork to initiate but would need monitoring\\nRash has been impacting pts quality of life, pt is reliant on wife to apply creams\\nPt reports 4-5 bowel movements daily, denies appearance of blood in stool, denies gaining weight - defer management w PCP\\nPt denies any significant change since stopping topical steroids\\nDiscussed CT CAP to ru cancer if further workups are negative
Render Risk Assessment In Note?: no

## 2024-04-26 ENCOUNTER — APPOINTMENT (RX ONLY)
Dept: URBAN - METROPOLITAN AREA CLINIC 15 | Facility: CLINIC | Age: 89
Setting detail: DERMATOLOGY
End: 2024-04-26

## 2024-04-26 DIAGNOSIS — Z79.899 OTHER LONG TERM (CURRENT) DRUG THERAPY: ICD-10-CM

## 2024-04-26 PROCEDURE — ? ORDER TESTS

## 2024-04-26 NOTE — PROCEDURE: ORDER TESTS
Billing Type: Third-Party Bill
Expected Date Of Service: 04/26/2024
Performing Laboratory: 0
Bill For Surgical Tray: no

## 2024-05-02 ENCOUNTER — RX ONLY (OUTPATIENT)
Age: 89
Setting detail: RX ONLY
End: 2024-05-02

## 2024-05-02 RX ORDER — FOLIC ACID 1 MG/1
TABLET ORAL
Qty: 30 | Refills: 5 | Status: ERX

## 2024-05-02 RX ORDER — METHOTREXATE SODIUM 2.5 MG/1
1 TABLET ORAL WEEKLY
Qty: 24 | Refills: 0 | Status: ERX

## 2024-05-29 ENCOUNTER — APPOINTMENT (RX ONLY)
Dept: URBAN - METROPOLITAN AREA CLINIC 15 | Facility: CLINIC | Age: 89
Setting detail: DERMATOLOGY
End: 2024-05-29

## 2024-05-29 DIAGNOSIS — D69.2 OTHER NONTHROMBOCYTOPENIC PURPURA: ICD-10-CM

## 2024-05-29 DIAGNOSIS — L30.8 OTHER SPECIFIED DERMATITIS: ICD-10-CM

## 2024-05-29 PROCEDURE — ? PRESCRIPTION MEDICATION MANAGEMENT

## 2024-05-29 PROCEDURE — ? PRESCRIPTION

## 2024-05-29 PROCEDURE — ? MEDICATION COUNSELING

## 2024-05-29 PROCEDURE — ? COUNSELING

## 2024-05-29 PROCEDURE — ? ADDITIONAL NOTES

## 2024-05-29 PROCEDURE — 99214 OFFICE O/P EST MOD 30 MIN: CPT

## 2024-05-29 RX ORDER — METHOTREXATE SODIUM 2.5 MG/1
TABLET ORAL WEEKLY
Qty: 24 | Refills: 1 | Status: ERX

## 2024-05-29 ASSESSMENT — LOCATION DETAILED DESCRIPTION DERM
LOCATION DETAILED: RIGHT SUPERIOR MEDIAL UPPER BACK
LOCATION DETAILED: RIGHT POSTERIOR SHOULDER
LOCATION DETAILED: LEFT POSTERIOR SHOULDER
LOCATION DETAILED: STERNUM

## 2024-05-29 ASSESSMENT — LOCATION SIMPLE DESCRIPTION DERM
LOCATION SIMPLE: RIGHT SHOULDER
LOCATION SIMPLE: LEFT SHOULDER
LOCATION SIMPLE: RIGHT UPPER BACK
LOCATION SIMPLE: CHEST

## 2024-05-29 ASSESSMENT — LOCATION ZONE DERM
LOCATION ZONE: ARM
LOCATION ZONE: TRUNK

## 2024-05-29 NOTE — PROCEDURE: ADDITIONAL NOTES
Detail Level: Simple
Additional Notes: 5/29/24\\nPatient states since this last Sunday his rash has gotten worse. Before Sunday he was doing much better. He states that he has hematomas on her shoulder and buttocks. Some of the redness has come back as well as the itching on his back. The cerave itch cream helped with the itching on his back, he applied the cream this morning. He still has triamcinolone cream at home and he has not been using it often. Patient has urticaria plaques on his buttocks. Discussed omalizumab infusion with allergist and patient. Refer to allergist to prescribe the infusion. Patient will schedule appt with allergist to discuss. Patient notices where he has extra pressure on his waist band that he has itching. Will check [facility] for results.\\n\\n*Jacob urticarial plaques in areas of pressure today. 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 Started 10/23 ago after being treated w/ abx (amox & cephalexin) and has persisted. bx x 3--urticarial dermatitis x 2 & granulomatous dermatitis (ruptured folliculitis) x 1.\\nDIF neg. BPAgs neg. cbc, cmp, esr/crp, tsh WNL. MARY ANN neg. JULI neg. SPEP/UPEP w/ m spike--heme/onc consulted & per pt, they are not concerned for evolving myeloma or malignancy. IgE WNL. H pylori breath test negative. complement WNL. Most recent TSH was trending towards hyperthyroidism but he was taking incorrect dosage; pcp is monitoring closely. allergy testing (4/16)-per patient, only notable for potential new apple allergy but he's avoiding t his & hasn't noticed any big changes. \\n\\nOnly mild improvement w/ topical steroids and IMK. No improvement w/ doxy, nbuvb, antihistamines (higher doses), itraconazole, ciprofloxacin (did have a bit of pseudomonas on PCR but cxs neg).\\n\\n4/22/24\\nFavor persistent reaction to drug. Since he is doing well we won’t do Ct but discussed at appointment. 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 He also got blood work done and we will get records. The triamcinolone ointment has been working for him the best. Dr. Tobar said that there were no signs of cancer and didn’t need any other testing. Methotrexate helped get it a lot better he was taking 4 a week. Side effects of methotrexate were upset stomach but he has been dealing with an upset stomach for a while. Dupixent bothered him and made his knees and hips hurt. \\n\\renee this point, we have done an exhaustive w/u that has been largely neg. the only thing we haven't done that could be considered is ct c/a/p to r/o an occult malignancy but his ROS is reassuring and he'd prefer to avoid. in addition, he's feeling encouraged since he has been noticing an improvement so we will hold on this. we will get repeat lfts & as long as ok, will have him inc to 15 mg weekly. triam prn. \\n\\n\\n3/25/24\\nPt states he is at a 3 or 4 on an itch scale. He has not gotten worse but feels he is at the same. currently on dupi; he hasn't started mtx. very urticarial & dermatographic on exam today. \\n\\n\\nPt reports that he has been on thyroid medication for a while, upcoming appointment 3/14\\ndiscussed SPEP irregularity, pt is seeing hematologist 4/4\\nComplains of joint pain (knees and hips while talking dog) and leg muscle cramping since starting dupixent \\nFeels no more than 10% improvement since starting dupixent - chest is better but back isn’t\\nUpon exam, bruising seen \\nWill need workup for multiple myeloma with heme/onc\\nHas been on plavix since 1999 which may be the cause of bruising\\nRecommended seeing Dr. Pagan and getting lab workup\\nDiscussed whether or not to try methotrexate, may impact further testing, wouldn’t need bloodwork to initiate but would need monitoring\\nRash has been impacting pts quality of life, pt is reliant on wife to apply creams\\nPt reports 4-5 bowel movements daily, denies appearance of blood in stool, denies gaining weight - defer management w PCP\\nPt denies any significant change since stopping topical steroids\\nDiscussed CT CAP to ru cancer if further workups are negative
Render Risk Assessment In Note?: no
Additional Notes: Patient has been on plavex for years. The rash has thinned his skin and the methotrexate can also increase bruising. Patient states that the bruising does not bother him or itch they go away with time.

## 2024-05-29 NOTE — PROCEDURE: PRESCRIPTION MEDICATION MANAGEMENT
Continue Regimen: Methotrexate 6 pills once a week (15 mg wkly)\\nfolic acid 1mg qd\\nCerave itch cream
Discontinue Regimen: Dupixent
Detail Level: Zone
Plan: will see what marion smith thinks about swetha
Render In Strict Bullet Format?: No

## 2024-06-03 ENCOUNTER — APPOINTMENT (RX ONLY)
Dept: URBAN - METROPOLITAN AREA CLINIC 15 | Facility: CLINIC | Age: 89
Setting detail: DERMATOLOGY
End: 2024-06-03

## 2024-06-03 DIAGNOSIS — Z79.899 OTHER LONG TERM (CURRENT) DRUG THERAPY: ICD-10-CM

## 2024-06-03 PROCEDURE — ? ORDER TESTS

## 2024-06-03 NOTE — PROCEDURE: ORDER TESTS
Billing Type: Third-Party Bill
Expected Date Of Service: 06/03/2024
Performing Laboratory: 0
Bill For Surgical Tray: no

## 2024-06-05 ENCOUNTER — RX ONLY (OUTPATIENT)
Age: 89
Setting detail: RX ONLY
End: 2024-06-05

## 2024-06-05 RX ORDER — METRONIDAZOLE 10 MG/60G
1 CREAM TOPICAL
Qty: 30 | Refills: 2 | Status: ERX | COMMUNITY
Start: 2024-06-05

## 2024-06-18 DIAGNOSIS — L50.8 OTHER URTICARIA: ICD-10-CM

## 2024-07-05 ENCOUNTER — OUTPATIENT INFUSION SERVICES (OUTPATIENT)
Dept: ONCOLOGY | Facility: MEDICAL CENTER | Age: 89
End: 2024-07-05
Attending: INTERNAL MEDICINE
Payer: MEDICARE

## 2024-07-05 VITALS
RESPIRATION RATE: 18 BRPM | HEART RATE: 71 BPM | SYSTOLIC BLOOD PRESSURE: 122 MMHG | HEIGHT: 66 IN | BODY MASS INDEX: 31.53 KG/M2 | DIASTOLIC BLOOD PRESSURE: 60 MMHG | OXYGEN SATURATION: 98 % | WEIGHT: 196.21 LBS | TEMPERATURE: 98 F

## 2024-07-05 DIAGNOSIS — L50.8 OTHER URTICARIA: ICD-10-CM

## 2024-07-05 PROCEDURE — 96372 THER/PROPH/DIAG INJ SC/IM: CPT

## 2024-07-05 PROCEDURE — 700111 HCHG RX REV CODE 636 W/ 250 OVERRIDE (IP): Mod: JZ,JG | Performed by: INTERNAL MEDICINE

## 2024-07-05 RX ADMIN — OMALIZUMAB 300 MG: 150 INJECTION, SOLUTION SUBCUTANEOUS at 14:28

## 2024-07-05 ASSESSMENT — FIBROSIS 4 INDEX: FIB4 SCORE: 1.27

## 2024-07-15 ENCOUNTER — APPOINTMENT (RX ONLY)
Dept: URBAN - METROPOLITAN AREA CLINIC 15 | Facility: CLINIC | Age: 89
Setting detail: DERMATOLOGY
End: 2024-07-15

## 2024-07-15 DIAGNOSIS — L30.8 OTHER SPECIFIED DERMATITIS: ICD-10-CM | Status: INADEQUATELY CONTROLLED

## 2024-07-15 PROCEDURE — ? COUNSELING

## 2024-07-15 PROCEDURE — 99214 OFFICE O/P EST MOD 30 MIN: CPT

## 2024-07-15 PROCEDURE — ? PRESCRIPTION MEDICATION MANAGEMENT

## 2024-07-15 PROCEDURE — ? ADDITIONAL NOTES

## 2024-07-15 PROCEDURE — ? MEDICATION COUNSELING

## 2024-07-15 ASSESSMENT — LOCATION DETAILED DESCRIPTION DERM
LOCATION DETAILED: RIGHT SUPERIOR MEDIAL UPPER BACK
LOCATION DETAILED: STERNUM

## 2024-07-15 ASSESSMENT — LOCATION ZONE DERM: LOCATION ZONE: TRUNK

## 2024-07-15 ASSESSMENT — LOCATION SIMPLE DESCRIPTION DERM
LOCATION SIMPLE: RIGHT UPPER BACK
LOCATION SIMPLE: CHEST

## 2024-07-15 NOTE — PROCEDURE: ADDITIONAL NOTES
Detail Level: Simple
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 Seeing some improvement s/p 1 mth of Xolair w/ Dr. Keller. Having significant GI s/e (diarrhea) 2/2 MTX so will stop this. \\n\\nHx of eruption: Started 10/23 ago after being treated w/ abx (amox & cephalexin) and has persisted. bx x 3--urticarial dermatitis x 2 & granulomatous dermatitis (ruptured folliculitis) x 1.\\nDIF neg. BPAgs neg. cbc, cmp, esr/crp, tsh WNL. MARY ANN neg. JULI neg. SPEP/UPEP w/ m spike--heme/onc consulted & per pt, they are not concerned for evolving myeloma or malignancy. IgE WNL. H pylori breath test negative. complement WNL. Most recent TSH was trending towards hyperthyroidism but he was taking incorrect dosage; pcp is monitoring closely. allergy testing (4/16)-per patient, only notable for potential new apple allergy but not clinically signficicant.Only mild improvement w/ topical steroids and IMK. No improvement w/ doxy, nbuvb, antihistamines (higher doses), itraconazole, ciprofloxacin (did have a bit of pseudomonas on PCR but cxs neg). Minimal improvement w/ MTX. \\n\\nWe have done an exhaustive w/u that has been largely neg. the only thing we haven't done that could be considered is ct c/a/p to r/o an occult malignancy but his ROS is reassuring and he'd prefer to avoid.\\n
Render Risk Assessment In Note?: no

## 2024-07-15 NOTE — PROCEDURE: PRESCRIPTION MEDICATION MANAGEMENT
Continue Regimen: Xolair q4 weeks (managed by allergy)\\nCerave itch cream
Discontinue Regimen: Methotrexate 6 pills once a week (15 mg wkly)\\nfolic acid 1mg qd
Detail Level: Zone
Plan: Consider gabapentin at fu if residual itch
Render In Strict Bullet Format?: No

## 2024-08-02 ENCOUNTER — OUTPATIENT INFUSION SERVICES (OUTPATIENT)
Dept: ONCOLOGY | Facility: MEDICAL CENTER | Age: 89
End: 2024-08-02
Attending: INTERNAL MEDICINE
Payer: MEDICARE

## 2024-08-02 VITALS
SYSTOLIC BLOOD PRESSURE: 139 MMHG | HEART RATE: 63 BPM | TEMPERATURE: 98.2 F | DIASTOLIC BLOOD PRESSURE: 68 MMHG | BODY MASS INDEX: 31.21 KG/M2 | RESPIRATION RATE: 18 BRPM | OXYGEN SATURATION: 97 % | WEIGHT: 198.85 LBS | HEIGHT: 67 IN

## 2024-08-02 DIAGNOSIS — L50.8 OTHER URTICARIA: ICD-10-CM

## 2024-08-02 PROCEDURE — 96372 THER/PROPH/DIAG INJ SC/IM: CPT

## 2024-08-02 PROCEDURE — 700111 HCHG RX REV CODE 636 W/ 250 OVERRIDE (IP): Mod: JZ,JG | Performed by: INTERNAL MEDICINE

## 2024-08-02 RX ADMIN — OMALIZUMAB 300 MG: 150 INJECTION, SOLUTION SUBCUTANEOUS at 12:11

## 2024-08-02 ASSESSMENT — FIBROSIS 4 INDEX: FIB4 SCORE: 1.27

## 2024-08-02 NOTE — PROGRESS NOTES
Pt arrived ambulatory to [facility] for Xolair injections for chronic urticaria. POC discussed with pt and he agrees with plan.     Epi-pen expires 08/2025. Pt medicated per MAR. Pt monitored x 90 minutes post Xolair injections. Pt tolerated treatment without s/s adverse reaction.     Pt discharged to self care, [facility]. Scheduling emailed for future appointments.

## 2024-08-27 ENCOUNTER — APPOINTMENT (RX ONLY)
Dept: URBAN - METROPOLITAN AREA CLINIC 4 | Facility: CLINIC | Age: 89
Setting detail: DERMATOLOGY
End: 2024-08-27

## 2024-08-27 DIAGNOSIS — L30.8 OTHER SPECIFIED DERMATITIS: ICD-10-CM

## 2024-08-27 PROCEDURE — ? COUNSELING

## 2024-08-27 PROCEDURE — ? PRESCRIPTION MEDICATION MANAGEMENT

## 2024-08-27 PROCEDURE — 99214 OFFICE O/P EST MOD 30 MIN: CPT

## 2024-08-27 PROCEDURE — ? ORDER TESTS

## 2024-08-27 PROCEDURE — ? PRESCRIPTION

## 2024-08-27 PROCEDURE — ? ADDITIONAL NOTES

## 2024-08-27 PROCEDURE — ? MEDICATION COUNSELING

## 2024-08-27 RX ORDER — METHOTREXATE SODIUM 2.5 MG/1
1 TABLET ORAL
Qty: 36 | Refills: 7 | Status: ERX

## 2024-08-27 ASSESSMENT — LOCATION SIMPLE DESCRIPTION DERM
LOCATION SIMPLE: RIGHT UPPER BACK
LOCATION SIMPLE: CHEST

## 2024-08-27 ASSESSMENT — LOCATION DETAILED DESCRIPTION DERM
LOCATION DETAILED: RIGHT SUPERIOR MEDIAL UPPER BACK
LOCATION DETAILED: STERNUM

## 2024-08-27 ASSESSMENT — LOCATION ZONE DERM: LOCATION ZONE: TRUNK

## 2024-08-27 NOTE — PROCEDURE: ORDER TESTS
Expected Date Of Service: 08/27/2024
Performing Laboratory: [id number]
Billing Type: Third-Party Bill
Bill For Surgical Tray: no

## 2024-08-27 NOTE — PROCEDURE: PRESCRIPTION MEDICATION MANAGEMENT
Continue Regimen: Xolair q4 weeks (managed by allergy)\\nCerave itch cream
Detail Level: Zone
Initiate Treatment: Methotrexate 4x weekly (10 mg/wk)
Render In Strict Bullet Format?: No

## 2024-08-27 NOTE — PROCEDURE: MEDICATION COUNSELING
Terbinafine Counseling: Patient counseling regarding adverse effects of terbinafine including but not limited to headache, diarrhea, rash, upset stomach, liver function test abnormalities, itching, taste/smell disturbance, nausea, abdominal pain, and flatulence.  There is a rare possibility of liver failure that can occur when taking terbinafine.  The patient understands that a baseline LFT and kidney function test may be required. The patient verbalized understanding of the proper use and possible adverse effects of terbinafine.  All of the patient's questions and concerns were addressed.
Klisyri Pregnancy And Lactation Text: It is unknown if this medication can harm a developing fetus or if it is excreted in breast milk.
Minocycline Counseling: Patient advised regarding possible photosensitivity and discoloration of the teeth, skin, lips, tongue and gums.  Patient instructed to avoid sunlight, if possible.  When exposed to sunlight, patients should wear protective clothing, sunglasses, and sunscreen.  The patient was instructed to call the office immediately if the following severe adverse effects occur:  hearing changes, easy bruising/bleeding, severe headache, or vision changes.  The patient verbalized understanding of the proper use and possible adverse effects of minocycline.  All of the patient's questions and concerns were addressed.
Gabapentin Counseling: I discussed with the patient the risks of gabapentin including but not limited to dizziness, somnolence, fatigue and ataxia.
Topical Retinoid counseling:  Patient advised to apply a pea-sized amount only at bedtime and wait 30 minutes after washing their face before applying.  If too drying, patient may add a non-comedogenic moisturizer. The patient verbalized understanding of the proper use and possible adverse effects of retinoids.  All of the patient's questions and concerns were addressed.
Spironolactone Pregnancy And Lactation Text: This medication can cause feminization of the male fetus and should be avoided during pregnancy. The active metabolite is also found in breast milk.
Topical Metronidazole Pregnancy And Lactation Text: This medication is Pregnancy Category B and considered safe during pregnancy.  It is also considered safe to use while breastfeeding.
Niacinamide Pregnancy And Lactation Text: These medications are considered safe during pregnancy.
Humira Counseling:  I discussed with the patient the risks of adalimumab including but not limited to myelosuppression, immunosuppression, autoimmune hepatitis, demyelinating diseases, lymphoma, and serious infections.  The patient understands that monitoring is required including a PPD at baseline and must alert us or the primary physician if symptoms of infection or other concerning signs are noted.
Cyclosporine Pregnancy And Lactation Text: This medication is Pregnancy Category C and it isn't know if it is safe during pregnancy. This medication is excreted in breast milk.
Winlevi Counseling:  I discussed with the patient the risks of topical clascoterone including but not limited to erythema, scaling, itching, and stinging. Patient voiced their understanding.
Opioid Pregnancy And Lactation Text: These medications can lead to premature delivery and should be avoided during pregnancy. These medications are also present in breast milk in small amounts.
Valtrex Counseling: I discussed with the patient the risks of valacyclovir including but not limited to kidney damage, nausea, vomiting and severe allergy.  The patient understands that if the infection seems to be worsening or is not improving, they are to call.
Benzoyl Peroxide Counseling: Patient counseled that medicine may cause skin irritation and bleach clothing.  In the event of skin irritation, the patient was advised to reduce the amount of the drug applied or use it less frequently.   The patient verbalized understanding of the proper use and possible adverse effects of benzoyl peroxide.  All of the patient's questions and concerns were addressed.
Tremfya Pregnancy And Lactation Text: The risk during pregnancy and breastfeeding is uncertain with this medication.
Olumiant Counseling: I discussed with the patient the risks of Olumiant therapy including but not limited to upper respiratory tract infections, shingles, cold sores, and nausea. Live vaccines should be avoided.  This medication has been linked to serious infections; higher rate of mortality; malignancy and lymphoproliferative disorders; major adverse cardiovascular events; thrombosis; gastrointestinal perforations; neutropenia; lymphopenia; anemia; liver enzyme elevations; and lipid elevations.
Cosentyx Counseling:  I discussed with the patient the risks of Cosentyx including but not limited to worsening of Crohn's disease, immunosuppression, allergic reactions and infections.  The patient understands that monitoring is required including a PPD at baseline and must alert us or the primary physician if symptoms of infection or other concerning signs are noted.
Protopic Pregnancy And Lactation Text: This medication is Pregnancy Category C. It is unknown if this medication is excreted in breast milk when applied topically.
Humira Pregnancy And Lactation Text: This medication is Pregnancy Category B and is considered safe during pregnancy. It is unknown if this medication is excreted in breast milk.
Methotrexate Counseling:  Patient counseled regarding adverse effects of methotrexate including but not limited to nausea, vomiting, abnormalities in liver function tests. Patients may develop mouth sores, rash, diarrhea, and abnormalities in blood counts. The patient understands that monitoring is required including LFT's and blood counts.  There is a rare possibility of scarring of the liver and lung problems that can occur when taking methotrexate. Persistent nausea, loss of appetite, pale stools, dark urine, cough, and shortness of breath should be reported immediately. Patient advised to discontinue methotrexate treatment at least three months before attempting to become pregnant.  I discussed the need for folate supplements while taking methotrexate.  These supplements can decrease side effects during methotrexate treatment. The patient verbalized understanding of the proper use and possible adverse effects of methotrexate.  All of the patient's questions and concerns were addressed.
Xolair Counseling:  Patient informed of potential adverse effects including but not limited to fever, muscle aches, rash and allergic reactions.  The patient verbalized understanding of the proper use and possible adverse effects of Xolair.  All of the patient's questions and concerns were addressed.
Isotretinoin Counseling: Patient should get monthly blood tests, not donate blood, not drive at night if vision affected, not share medication, and not undergo elective surgery for 6 months after tx completed. Side effects reviewed, pt to contact office should one occur.
Nsaids Counseling: NSAID Counseling: I discussed with the patient that NSAIDs should be taken with food. Prolonged use of NSAIDs can result in the development of stomach ulcers.  Patient advised to stop taking NSAIDs if abdominal pain occurs.  The patient verbalized understanding of the proper use and possible adverse effects of NSAIDs.  All of the patient's questions and concerns were addressed.
Winlevi Pregnancy And Lactation Text: This medication is considered safe during pregnancy and breastfeeding.
Benzoyl Peroxide Pregnancy And Lactation Text: This medication is Pregnancy Category C. It is unknown if benzoyl peroxide is excreted in breast milk.
Oxybutynin Pregnancy And Lactation Text: This medication is Pregnancy Category B and is considered safe during pregnancy. It is unknown if it is excreted in breast milk.
Simponi Counseling:  I discussed with the patient the risks of golimumab including but not limited to myelosuppression, immunosuppression, autoimmune hepatitis, demyelinating diseases, lymphoma, and serious infections.  The patient understands that monitoring is required including a PPD at baseline and must alert us or the primary physician if symptoms of infection or other concerning signs are noted.
Elidel Counseling: Patient may experience a mild burning sensation during topical application. Elidel is not approved in children less than 2 years of age. There have been case reports of hematologic and skin malignancies in patients using topical calcineurin inhibitors although causality is questionable.
Cephalexin Pregnancy And Lactation Text: This medication is Pregnancy Category B and considered safe during pregnancy.  It is also excreted in breast milk but can be used safely for shorter doses.
Albendazole Pregnancy And Lactation Text: This medication is Pregnancy Category C and it isn't known if it is safe during pregnancy. It is also excreted in breast milk.
Dutasteride Male Counseling: Dustasteride Counseling:  I discussed with the patient the risks of use of dutasteride including but not limited to decreased libido, decreased ejaculate volume, and gynecomastia. Women who can become pregnant should not handle medication.  All of the patient's questions and concerns were addressed.
Gabapentin Pregnancy And Lactation Text: This medication is Pregnancy Category C and isn't considered safe during pregnancy. It is excreted in breast milk.
Minocycline Pregnancy And Lactation Text: This medication is Pregnancy Category D and not consider safe during pregnancy. It is also excreted in breast milk.
Fluconazole Counseling:  Patient counseled regarding adverse effects of fluconazole including but not limited to headache, diarrhea, nausea, upset stomach, liver function test abnormalities, taste disturbance, and stomach pain.  There is a rare possibility of liver failure that can occur when taking fluconazole.  The patient understands that monitoring of LFTs and kidney function test may be required, especially at baseline. The patient verbalized understanding of the proper use and possible adverse effects of fluconazole.  All of the patient's questions and concerns were addressed.
Propranolol Counseling:  I discussed with the patient the risks of propranolol including but not limited to low heart rate, low blood pressure, low blood sugar, restlessness and increased cold sensitivity. They should call the office if they experience any of these side effects.
Elidel Pregnancy And Lactation Text: This medication is Pregnancy Category C. It is unknown if this medication is excreted in breast milk.
Detail Level: Simple
Terbinafine Pregnancy And Lactation Text: This medication is Pregnancy Category B and is considered safe during pregnancy. It is also excreted in breast milk and breast feeding isn't recommended.
Olumiant Pregnancy And Lactation Text: Based on animal studies, Olumiant may cause embryo-fetal harm when administered to pregnant women.  The medication should not be used in pregnancy.  Breastfeeding is not recommended during treatment.
Minoxidil Counseling: Minoxidil is a topical medication which can increase blood flow where it is applied. It is uncertain how this medication increases hair growth. Side effects are uncommon and include stinging and allergic reactions.
Valtrex Pregnancy And Lactation Text: this medication is Pregnancy Category B and is considered safe during pregnancy. This medication is not directly found in breast milk but it's metabolite acyclovir is present.
Rinvoq Counseling: I discussed with the patient the risks of Rinvoq therapy including but not limited to upper respiratory tract infections, shingles, cold sores, bronchitis, nausea, cough, fever, acne, and headache. Live vaccines should be avoided.  This medication has been linked to serious infections; higher rate of mortality; malignancy and lymphoproliferative disorders; major adverse cardiovascular events; thrombosis; thrombocytopenia, anemia, and neutropenia; lipid elevations; liver enzyme elevations; and gastrointestinal perforations.
Isotretinoin Pregnancy And Lactation Text: This medication is Pregnancy Category X and is considered extremely dangerous during pregnancy. It is unknown if it is excreted in breast milk.
VTAMA Counseling: I discussed with the patient that VTAMA is not for use in the eyes, mouth or mouth. They should call the office if they develop any signs of allergic reactions to VTAMA. The patient verbalized understanding of the proper use and possible adverse effects of VTAMA.  All of the patient's questions and concerns were addressed.
Use Enhanced Medication Counseling?: No
Methotrexate Pregnancy And Lactation Text: This medication is Pregnancy Category X and is known to cause fetal harm. This medication is excreted in breast milk.
Carac Counseling:  I discussed with the patient the risks of Carac including but not limited to erythema, scaling, itching, weeping, crusting, and pain.
Xolair Pregnancy And Lactation Text: This medication is Pregnancy Category B and is considered safe during pregnancy. This medication is excreted in breast milk.
Minoxidil Pregnancy And Lactation Text: This medication has not been assigned a Pregnancy Risk Category but animal studies failed to show danger with the topical medication. It is unknown if the medication is excreted in breast milk.
Clindamycin Counseling: I counseled the patient regarding use of clindamycin as an antibiotic for prophylactic and/or therapeutic purposes. Clindamycin is active against numerous classes of bacteria, including skin bacteria. Side effects may include nausea, diarrhea, gastrointestinal upset, rash, hives, yeast infections, and in rare cases, colitis.
Ivermectin Counseling:  Patient instructed to take medication on an empty stomach with a full glass of water.  Patient informed of potential adverse effects including but not limited to nausea, diarrhea, dizziness, itching, and swelling of the extremities or lymph nodes.  The patient verbalized understanding of the proper use and possible adverse effects of ivermectin.  All of the patient's questions and concerns were addressed.
Arava Counseling:  Patient counseled regarding adverse effects of Arava including but not limited to nausea, vomiting, abnormalities in liver function tests. Patients may develop mouth sores, rash, diarrhea, and abnormalities in blood counts. The patient understands that monitoring is required including LFTs and blood counts.  There is a rare possibility of scarring of the liver and lung problems that can occur when taking methotrexate. Persistent nausea, loss of appetite, pale stools, dark urine, cough, and shortness of breath should be reported immediately. Patient advised to discontinue Arava treatment and consult with a physician prior to attempting conception. The patient will have to undergo a treatment to eliminate Arava from the body prior to conception.
Qbrexza Counseling:  I discussed with the patient the risks of Qbrexza including but not limited to headache, mydriasis, blurred vision, dry eyes, nasal dryness, dry mouth, dry throat, dry skin, urinary hesitation, and constipation.  Local skin reactions including erythema, burning, stinging, and itching can also occur.
Ilumya Counseling: I discussed with the patient the risks of tildrakizumab including but not limited to immunosuppression, malignancy, posterior leukoencephalopathy syndrome, and serious infections.  The patient understands that monitoring is required including a PPD at baseline and must alert us or the primary physician if symptoms of infection or other concerning signs are noted.
Nsaids Pregnancy And Lactation Text: These medications are considered safe up to 30 weeks gestation. It is excreted in breast milk.
Dutasteride Female Counseling: Dutasteride Counseling:  I discussed with the patient the risks of use of dutasteride including but not limited to decreased libido and sexual dysfunction. Explained the teratogenic nature of the medication and stressed the importance of not getting pregnant during treatment. All of the patient's questions and concerns were addressed.
Fluconazole Pregnancy And Lactation Text: This medication is Pregnancy Category C and it isn't know if it is safe during pregnancy. It is also excreted in breast milk.
Tazorac Counseling:  Patient advised that medication is irritating and drying.  Patient may need to apply sparingly and wash off after an hour before eventually leaving it on overnight.  The patient verbalized understanding of the proper use and possible adverse effects of tazorac.  All of the patient's questions and concerns were addressed.
Eucrisa Counseling: Patient may experience a mild burning sensation during topical application. Eucrisa is not approved in children less than 3 months of age.
Cimetidine Counseling:  I discussed with the patient the risks of Cimetidine including but not limited to gynecomastia, headache, diarrhea, nausea, drowsiness, arrhythmias, pancreatitis, skin rashes, psychosis, bone marrow suppression and kidney toxicity.
Qbrexza Pregnancy And Lactation Text: There is no available data on Qbrexza use in pregnant women.  There is no available data on Qbrexza use in lactation.
Arava Pregnancy And Lactation Text: This medication is Pregnancy Category X and is absolutely contraindicated during pregnancy. It is unknown if it is excreted in breast milk.
Dutasteride Pregnancy And Lactation Text: This medication is absolutely contraindicated in women, especially during pregnancy and breast feeding. Feminization of male fetuses is possible if taking while pregnant.
Dupixent Counseling: I discussed with the patient the risks of dupilumab including but not limited to eye inflammation and irritation, cold sores, injection site reactions, allergic reactions and increased risk of parasitic infection. The patient understands that monitoring is required and they must alert us or the primary physician if symptoms of infection or other concerning signs are noted.
Glycopyrrolate Counseling:  I discussed with the patient the risks of glycopyrrolate including but not limited to skin rash, drowsiness, dry mouth, difficulty urinating, and blurred vision.
Skyrizi Counseling: I discussed with the patient the risks of risankizumab-rzaa including but not limited to immunosuppression, and serious infections.  The patient understands that monitoring is required including a PPD at baseline and must alert us or the primary physician if symptoms of infection or other concerning signs are noted.
Propranolol Pregnancy And Lactation Text: This medication is Pregnancy Category C and it isn't known if it is safe during pregnancy. It is excreted in breast milk.
Azathioprine Counseling:  I discussed with the patient the risks of azathioprine including but not limited to myelosuppression, immunosuppression, hepatotoxicity, lymphoma, and infections.  The patient understands that monitoring is required including baseline LFTs, Creatinine, possible TPMP genotyping and weekly CBCs for the first month and then every 2 weeks thereafter.  The patient verbalized understanding of the proper use and possible adverse effects of azathioprine.  All of the patient's questions and concerns were addressed.
Quinolones Counseling:  I discussed with the patient the risks of fluoroquinolones including but not limited to GI upset, allergic reaction, drug rash, diarrhea, dizziness, photosensitivity, yeast infections, liver function test abnormalities, tendonitis/tendon rupture.
Mirvaso Counseling: Mirvaso is a topical medication which can decrease superficial blood flow where applied. Side effects are uncommon and include stinging, redness and allergic reactions.
Vtama Pregnancy And Lactation Text: It is unknown if this medication can cause problems during pregnancy and breastfeeding.
Azathioprine Pregnancy And Lactation Text: This medication is Pregnancy Category D and isn't considered safe during pregnancy. It is unknown if this medication is excreted in breast milk.
Topical Steroids Counseling: I discussed with the patient that prolonged use of topical steroids can result in the increased appearance of superficial blood vessels (telangiectasias), lightening (hypopigmentation) and thinning of the skin (atrophy).  Patient understands to avoid using high potency steroids in skin folds, the groin or the face.  The patient verbalized understanding of the proper use and possible adverse effects of topical steroids.  All of the patient's questions and concerns were addressed.
Glycopyrrolate Pregnancy And Lactation Text: This medication is Pregnancy Category B and is considered safe during pregnancy. It is unknown if it is excreted breast milk.
Olanzapine Counseling- I discussed with the patient the common side effects of olanzapine including but are not limited to: lack of energy, dry mouth, increased appetite, sleepiness, tremor, constipation, dizziness, changes in behavior, or restlessness.  Explained that teenagers are more likely to experience headaches, abdominal pain, pain in the arms or legs, tiredness, and sleepiness.  Serious side effects include but are not limited: increased risk of death in elderly patients who are confused, have memory loss, or dementia-related psychosis; hyperglycemia; increased cholesterol and triglycerides; and weight gain.
Sotyktu Pregnancy And Lactation Text: There is insufficient data to evaluate whether or not Sotyktu is safe to use during pregnancy.   It is not known if Sotyktu passes into breast milk and whether or not it is safe to use when breastfeeding.  
Prednisone Counseling:  I discussed with the patient the risks of prolonged use of prednisone including but not limited to weight gain, insomnia, osteoporosis, mood changes, diabetes, susceptibility to infection, glaucoma and high blood pressure.  In cases where prednisone use is prolonged, patients should be monitored with blood pressure checks, serum glucose levels and an eye exam.  Additionally, the patient may need to be placed on GI prophylaxis, PCP prophylaxis, and calcium and vitamin D supplementation and/or a bisphosphonate.  The patient verbalized understanding of the proper use and the possible adverse effects of prednisone.  All of the patient's questions and concerns were addressed.
Clindamycin Pregnancy And Lactation Text: This medication can be used in pregnancy if certain situations. Clindamycin is also present in breast milk.
High Dose Vitamin A Counseling: Side effects reviewed, pt to contact office should one occur.
Carac Pregnancy And Lactation Text: This medication is Pregnancy Category X and contraindicated in pregnancy and in women who may become pregnant. It is unknown if this medication is excreted in breast milk.
Rinvoq Pregnancy And Lactation Text: Based on animal studies, Rinvoq may cause embryo-fetal harm when administered to pregnant women.  The medication should not be used in pregnancy.  Breastfeeding is not recommended during treatment and for 6 days after the last dose.
Clofazimine Counseling:  I discussed with the patient the risks of clofazimine including but not limited to skin and eye pigmentation, liver damage, nausea/vomiting, gastrointestinal bleeding and allergy.
Xeljanz Counseling: I discussed with the patient the risks of Xeljanz therapy including increased risk of infection, liver issues, headache, diarrhea, or cold symptoms. Live vaccines should be avoided. They were instructed to call if they have any problems.
Infliximab Counseling:  I discussed with the patient the risks of infliximab including but not limited to myelosuppression, immunosuppression, autoimmune hepatitis, demyelinating diseases, lymphoma, and serious infections.  The patient understands that monitoring is required including a PPD at baseline and must alert us or the primary physician if symptoms of infection or other concerning signs are noted.
Finasteride Male Counseling: Finasteride Counseling:  I discussed with the patient the risks of use of finasteride including but not limited to decreased libido, decreased ejaculate volume, gynecomastia, and depression. Women should not handle medication.  All of the patient's questions and concerns were addressed.
Doxycycline Counseling:  Patient counseled regarding possible photosensitivity and increased risk for sunburn.  Patient instructed to avoid sunlight, if possible.  When exposed to sunlight, patients should wear protective clothing, sunglasses, and sunscreen.  The patient was instructed to call the office immediately if the following severe adverse effects occur:  hearing changes, easy bruising/bleeding, severe headache, or vision changes.  The patient verbalized understanding of the proper use and possible adverse effects of doxycycline.  All of the patient's questions and concerns were addressed.
Sotyktu Counseling:  I discussed the most common side effects of Sotyktu including: common cold, sore throat, sinus infections, cold sores, canker sores, folliculitis, and acne.  I also discussed more serious side effects of Sotyktu including but not limited to: serious allergic reactions; increased risk for infections such as TB; cancers such as lymphomas; rhabdomyolysis and elevated CPK; and elevated triglycerides and liver enzymes. 
High Dose Vitamin A Pregnancy And Lactation Text: High dose vitamin A therapy is contraindicated during pregnancy and breast feeding.
Olanzapine Pregnancy And Lactation Text: This medication is pregnancy category C.   There are no adequate and well controlled trials with olanzapine in pregnant females.  Olanzapine should be used during pregnancy only if the potential benefit justifies the potential risk to the fetus.   In a study in lactating healthy women, olanzapine was excreted in breast milk.  It is recommended that women taking olanzapine should not breast feed.
Rhofade Counseling: Rhofade is a topical medication which can decrease superficial blood flow where applied. Side effects are uncommon and include stinging, redness and allergic reactions.
Calcipotriene Counseling:  I discussed with the patient the risks of calcipotriene including but not limited to erythema, scaling, itching, and irritation.
Griseofulvin Counseling:  I discussed with the patient the risks of griseofulvin including but not limited to photosensitivity, cytopenia, liver damage, nausea/vomiting and severe allergy.  The patient understands that this medication is best absorbed when taken with a fatty meal (e.g., ice cream or french fries).
SSKI Counseling:  I discussed with the patient the risks of SSKI including but not limited to thyroid abnormalities, metallic taste, GI upset, fever, headache, acne, arthralgias, paraesthesias, lymphadenopathy, easy bleeding, arrhythmias, and allergic reaction.
Tazorac Pregnancy And Lactation Text: This medication is not safe during pregnancy. It is unknown if this medication is excreted in breast milk.
Erivedge Counseling- I discussed with the patient the risks of Erivedge including but not limited to nausea, vomiting, diarrhea, constipation, weight loss, changes in the sense of taste, decreased appetite, muscle spasms, and hair loss.  The patient verbalized understanding of the proper use and possible adverse effects of Erivedge.  All of the patient's questions and concerns were addressed.
Hydroxychloroquine Counseling:  I discussed with the patient that a baseline ophthalmologic exam is needed at the start of therapy and every year thereafter while on therapy. A CBC may also be warranted for monitoring.  The side effects of this medication were discussed with the patient, including but not limited to agranulocytosis, aplastic anemia, seizures, rashes, retinopathy, and liver toxicity. Patient instructed to call the office should any adverse effect occur.  The patient verbalized understanding of the proper use and possible adverse effects of Plaquenil.  All the patient's questions and concerns were addressed.
Adbry Counseling: I discussed with the patient the risks of tralokinumab including but not limited to eye infection and irritation, cold sores, injection site reactions, worsening of asthma, allergic reactions and increased risk of parasitic infection.  Live vaccines should be avoided while taking tralokinumab. The patient understands that monitoring is required and they must alert us or the primary physician if symptoms of infection or other concerning signs are noted.
Cellcept Counseling:  I discussed with the patient the risks of mycophenolate mofetil including but not limited to infection/immunosuppression, GI upset, hypokalemia, hypercholesterolemia, bone marrow suppression, lymphoproliferative disorders, malignancy, GI ulceration/bleed/perforation, colitis, interstitial lung disease, kidney failure, progressive multifocal leukoencephalopathy, and birth defects.  The patient understands that monitoring is required including a baseline creatinine and regular CBC testing. In addition, patient must alert us immediately if symptoms of infection or other concerning signs are noted.
Rifampin Counseling: I discussed with the patient the risks of rifampin including but not limited to liver damage, kidney damage, red-orange body fluids, nausea/vomiting and severe allergy.
Topical Clindamycin Counseling: Patient counseled that this medication may cause skin irritation or allergic reactions.  In the event of skin irritation, the patient was advised to reduce the amount of the drug applied or use it less frequently.   The patient verbalized understanding of the proper use and possible adverse effects of clindamycin.  All of the patient's questions and concerns were addressed.
Calcipotriene Pregnancy And Lactation Text: The use of this medication during pregnancy or lactation is not recommended as there is insufficient data.
Griseofulvin Pregnancy And Lactation Text: This medication is Pregnancy Category X and is known to cause serious birth defects. It is unknown if this medication is excreted in breast milk but breast feeding should be avoided.
Doxepin Counseling:  Patient advised that the medication is sedating and not to drive a car after taking this medication. Patient informed of potential adverse effects including but not limited to dry mouth, urinary retention, and blurry vision.  The patient verbalized understanding of the proper use and possible adverse effects of doxepin.  All of the patient's questions and concerns were addressed.
Sski Pregnancy And Lactation Text: This medication is Pregnancy Category D and isn't considered safe during pregnancy. It is excreted in breast milk.
Hydroquinone Counseling:  Patient advised that medication may result in skin irritation, lightening (hypopigmentation), dryness, and burning.  In the event of skin irritation, the patient was advised to reduce the amount of the drug applied or use it less frequently.  Rarely, spots that are treated with hydroquinone can become darker (pseudoochronosis).  Should this occur, patient instructed to stop medication and call the office. The patient verbalized understanding of the proper use and possible adverse effects of hydroquinone.  All of the patient's questions and concerns were addressed.
Topical Steroids Applications Pregnancy And Lactation Text: Most topical steroids are considered safe to use during pregnancy and lactation.  Any topical steroid applied to the breast or nipple should be washed off before breastfeeding.
Mirvaso Pregnancy And Lactation Text: This medication has not been assigned a Pregnancy Risk Category. It is unknown if the medication is excreted in breast milk.
Zoryve Counseling:  I discussed with the patient that Zoryve is not for use in the eyes, mouth or vagina. The most commonly reported side effects include diarrhea, headache, insomnia, application site pain, upper respiratory tract infections, and urinary tract infections.  All of the patient's questions and concerns were addressed.
Cantharidin Counseling:  I discussed with the patient the risks of Cantharidin including but not limited to pain, redness, burning, itching, and blistering.
Stelara Counseling:  I discussed with the patient the risks of ustekinumab including but not limited to immunosuppression, malignancy, posterior leukoencephalopathy syndrome, and serious infections.  The patient understands that monitoring is required including a PPD at baseline and must alert us or the primary physician if symptoms of infection or other concerning signs are noted.
Xelarenz Pregnancy And Lactation Text: This medication is Pregnancy Category D and is not considered safe during pregnancy.  The risk during breast feeding is also uncertain.
Oral Minoxidil Counseling- I discussed with the patient the risks of oral minoxidil including but not limited to shortness of breath, swelling of the feet or ankles, dizziness, lightheadedness, unwanted hair growth and allergic reaction.  The patient verbalized understanding of the proper use and possible adverse effects of oral minoxidil.  All of the patient's questions and concerns were addressed.
Finasteride Female Counseling: Finasteride Counseling:  I discussed with the patient the risks of use of finasteride including but not limited to decreased libido and sexual dysfunction. Explained the teratogenic nature of the medication and stressed the importance of not getting pregnant during treatment. All of the patient's questions and concerns were addressed.
Doxycycline Pregnancy And Lactation Text: This medication is Pregnancy Category D and not consider safe during pregnancy. It is also excreted in breast milk but is considered safe for shorter treatment courses.
Topical Sulfur Applications Counseling: Topical Sulfur Counseling: Patient counseled that this medication may cause skin irritation or allergic reactions.  In the event of skin irritation, the patient was advised to reduce the amount of the drug applied or use it less frequently.   The patient verbalized understanding of the proper use and possible adverse effects of topical sulfur application.  All of the patient's questions and concerns were addressed.
Doxepin Pregnancy And Lactation Text: This medication is Pregnancy Category C and it isn't known if it is safe during pregnancy. It is also excreted in breast milk and breast feeding isn't recommended.
Finasteride Pregnancy And Lactation Text: This medication is absolutely contraindicated during pregnancy. It is unknown if it is excreted in breast milk.
Thalidomide Counseling: I discussed with the patient the risks of thalidomide including but not limited to birth defects, anxiety, weakness, chest pain, dizziness, cough and severe allergy.
Libtayo Counseling- I discussed with the patient the risks of Libtayo including but not limited to nausea, vomiting, diarrhea, and bone or muscle pain.  The patient verbalized understanding of the proper use and possible adverse effects of Libtayo.  All of the patient's questions and concerns were addressed.
Colchicine Counseling:  Patient counseled regarding adverse effects including but not limited to stomach upset (nausea, vomiting, stomach pain, or diarrhea).  Patient instructed to limit alcohol consumption while taking this medication.  Colchicine may reduce blood counts especially with prolonged use.  The patient understands that monitoring of kidney function and blood counts may be required, especially at baseline. The patient verbalized understanding of the proper use and possible adverse effects of colchicine.  All of the patient's questions and concerns were addressed.
Hydroxychloroquine Pregnancy And Lactation Text: This medication has been shown to cause fetal harm but it isn't assigned a Pregnancy Risk Category. There are small amounts excreted in breast milk.
Azithromycin Counseling:  I discussed with the patient the risks of azithromycin including but not limited to GI upset, allergic reaction, drug rash, diarrhea, and yeast infections.
Opzelura Counseling:  I discussed with the patient the risks of Opzelura including but not limited to nasopharngitis, bronchitis, ear infection, eosinophila, hives, diarrhea, folliculitis, tonsillitis, and rhinorrhea.  Taken orally, this medication has been linked to serious infections; higher rate of mortality; malignancy and lymphoproliferative disorders; major adverse cardiovascular events; thrombosis; thrombocytopenia, anemia, and neutropenia; and lipid elevations.
Aklief counseling:  Patient advised to apply a pea-sized amount only at bedtime and wait 30 minutes after washing their face before applying.  If too drying, patient may add a non-comedogenic moisturizer.  The most commonly reported side effects including irritation, redness, scaling, dryness, stinging, burning, itching, and increased risk of sunburn.  The patient verbalized understanding of the proper use and possible adverse effects of retinoids.  All of the patient's questions and concerns were addressed.
Rifampin Pregnancy And Lactation Text: This medication is Pregnancy Category C and it isn't know if it is safe during pregnancy. It is also excreted in breast milk and should not be used if you are breast feeding.
Adbry Pregnancy And Lactation Text: It is unknown if this medication will adversely affect pregnancy or breast feeding.
Cantharidin Pregnancy And Lactation Text: This medication has not been proven safe during pregnancy. It is unknown if this medication is excreted in breast milk.
Itraconazole Counseling:  I discussed with the patient the risks of itraconazole including but not limited to liver damage, nausea/vomiting, neuropathy, and severe allergy.  The patient understands that this medication is best absorbed when taken with acidic beverages such as non-diet cola or ginger ale.  The patient understands that monitoring is required including baseline LFTs and repeat LFTs at intervals.  The patient understands that they are to contact us or the primary physician if concerning signs are noted.
Cibinqo Counseling: I discussed with the patient the risks of Cibinqo therapy including but not limited to common cold, nausea, headache, cold sores, increased blood CPK levels, dizziness, UTIs, fatigue, acne, and vomitting. Live vaccines should be avoided.  This medication has been linked to serious infections; higher rate of mortality; malignancy and lymphoproliferative disorders; major adverse cardiovascular events; thrombosis; thrombocytopenia and lymphopenia; lipid elevations; and retinal detachment.
Zyclara Counseling:  I discussed with the patient the risks of imiquimod including but not limited to erythema, scaling, itching, weeping, crusting, and pain.  Patient understands that the inflammatory response to imiquimod is variable from person to person and was educated regarded proper titration schedule.  If flu-like symptoms develop, patient knows to discontinue the medication and contact us.
Azithromycin Pregnancy And Lactation Text: This medication is considered safe during pregnancy and is also secreted in breast milk.
Cyclophosphamide Counseling:  I discussed with the patient the risks of cyclophosphamide including but not limited to hair loss, hormonal abnormalities, decreased fertility, abdominal pain, diarrhea, nausea and vomiting, bone marrow suppression and infection. The patient understands that monitoring is required while taking this medication.
Acitretin Counseling:  I discussed with the patient the risks of acitretin including but not limited to hair loss, dry lips/skin/eyes, liver damage, hyperlipidemia, depression/suicidal ideation, photosensitivity.  Serious rare side effects can include but are not limited to pancreatitis, pseudotumor cerebri, bony changes, clot formation/stroke/heart attack.  Patient understands that alcohol is contraindicated since it can result in liver toxicity and significantly prolong the elimination of the drug by many years.
Bimzelx Counseling:  I discussed with the patient the risks of Bimzelx including but not limited to depression, immunosuppression, allergic reactions and infections.  The patient understands that monitoring is required including a PPD at baseline and must alert us or the primary physician if symptoms of infection or other concerning signs are noted.
Opzelura Pregnancy And Lactation Text: There is insufficient data to evaluate drug-associated risk for major birth defects, miscarriage, or other adverse maternal or fetal outcomes.  There is a pregnancy registry that monitors pregnancy outcomes in pregnant persons exposed to the medication during pregnancy.  It is unknown if this medication is excreted in breast milk.  Do not breastfeed during treatment and for about 4 weeks after the last dose.
Aklief Pregnancy And Lactation Text: It is unknown if this medication is safe to use during pregnancy.  It is unknown if this medication is excreted in breast milk.  Breastfeeding women should use the topical cream on the smallest area of the skin for the shortest time needed while breastfeeding.  Do not apply to nipple and areola.
Low Dose Naltrexone Counseling- I discussed with the patient the potential risks and side effects of low dose naltrexone including but not limited to: more vivid dreams, headaches, nausea, vomiting, abdominal pain, fatigue, dizziness, and anxiety.
Erythromycin Counseling:  I discussed with the patient the risks of erythromycin including but not limited to GI upset, allergic reaction, drug rash, diarrhea, increase in liver enzymes, and yeast infections.
Solaraze Counseling:  I discussed with the patient the risks of Solaraze including but not limited to erythema, scaling, itching, weeping, crusting, and pain.
Oral Minoxidil Pregnancy And Lactation Text: This medication should only be used when clearly needed if you are pregnant, attempting to become pregnant or breast feeding.
5-Fu Counseling: 5-Fluorouracil Counseling:  I discussed with the patient the risks of 5-fluorouracil including but not limited to erythema, scaling, itching, weeping, crusting, and pain.
Rituxan Counseling:  I discussed with the patient the risks of Rituxan infusions. Side effects can include infusion reactions, severe drug rashes including mucocutaneous reactions, reactivation of latent hepatitis and other infections and rarely progressive multifocal leukoencephalopathy.  All of the patient's questions and concerns were addressed.
Imiquimod Counseling:  I discussed with the patient the risks of imiquimod including but not limited to erythema, scaling, itching, weeping, crusting, and pain.  Patient understands that the inflammatory response to imiquimod is variable from person to person and was educated regarded proper titration schedule.  If flu-like symptoms develop, patient knows to discontinue the medication and contact us.
Birth Control Pills Counseling: Birth Control Pill Counseling: I discussed with the patient the potential side effects of OCPs including but not limited to increased risk of stroke, heart attack, thrombophlebitis, deep venous thrombosis, hepatic adenomas, breast changes, GI upset, headaches, and depression.  The patient verbalized understanding of the proper use and possible adverse effects of OCPs. All of the patient's questions and concerns were addressed.
Hydroxyzine Counseling: Patient advised that the medication is sedating and not to drive a car after taking this medication.  Patient informed of potential adverse effects including but not limited to dry mouth, urinary retention, and blurry vision.  The patient verbalized understanding of the proper use and possible adverse effects of hydroxyzine.  All of the patient's questions and concerns were addressed.
Erythromycin Pregnancy And Lactation Text: This medication is Pregnancy Category B and is considered safe during pregnancy. It is also excreted in breast milk.
Solaraze Pregnancy And Lactation Text: This medication is Pregnancy Category B and is considered safe. There is some data to suggest avoiding during the third trimester. It is unknown if this medication is excreted in breast milk.
Sarecycline Counseling: Patient advised regarding possible photosensitivity and discoloration of the teeth, skin, lips, tongue and gums.  Patient instructed to avoid sunlight, if possible.  When exposed to sunlight, patients should wear protective clothing, sunglasses, and sunscreen.  The patient was instructed to call the office immediately if the following severe adverse effects occur:  hearing changes, easy bruising/bleeding, severe headache, or vision changes.  The patient verbalized understanding of the proper use and possible adverse effects of sarecycline.  All of the patient's questions and concerns were addressed.
Dupixent Pregnancy And Lactation Text: This medication likely crosses the placenta but the risk for the fetus is uncertain. This medication is excreted in breast milk.
Topical Ketoconazole Counseling: Patient counseled that this medication may cause skin irritation or allergic reactions.  In the event of skin irritation, the patient was advised to reduce the amount of the drug applied or use it less frequently.   The patient verbalized understanding of the proper use and possible adverse effects of ketoconazole.  All of the patient's questions and concerns were addressed.
Taltz Counseling: I discussed with the patient the risks of ixekizumab including but not limited to immunosuppression, serious infections, worsening of inflammatory bowel disease and drug reactions.  The patient understands that monitoring is required including a PPD at baseline and must alert us or the primary physician if symptoms of infection or other concerning signs are noted.
Cibinqo Pregnancy And Lactation Text: It is unknown if this medication will adversely affect pregnancy or breast feeding.  You should not take this medication if you are currently pregnant or planning a pregnancy or while breastfeeding.
Libtayo Pregnancy And Lactation Text: This medication is contraindicated in pregnancy and when breast feeding.
Topical Sulfur Applications Pregnancy And Lactation Text: This medication is considered safe during pregnancy and breast feeding secondary to limited systemic absorption.
Bimzelx Pregnancy And Lactation Text: This medication crosses the placenta and the safety is uncertain during pregnancy. It is unknown if this medication is present in breast milk.
Azelaic Acid Counseling: Patient counseled that medicine may cause skin irritation and to avoid applying near the eyes.  In the event of skin irritation, the patient was advised to reduce the amount of the drug applied or use it less frequently.   The patient verbalized understanding of the proper use and possible adverse effects of azelaic acid.  All of the patient's questions and concerns were addressed.
Picato Counseling:  I discussed with the patient the risks of Picato including but not limited to erythema, scaling, itching, weeping, crusting, and pain.
Enbrel Counseling:  I discussed with the patient the risks of etanercept including but not limited to myelosuppression, immunosuppression, autoimmune hepatitis, demyelinating diseases, lymphoma, and infections.  The patient understands that monitoring is required including a PPD at baseline and must alert us or the primary physician if symptoms of infection or other concerning signs are noted.
Ketoconazole Counseling:   Patient counseled regarding improving absorption with orange juice.  Adverse effects include but are not limited to breast enlargement, headache, diarrhea, nausea, upset stomach, liver function test abnormalities, taste disturbance, and stomach pain.  There is a rare possibility of liver failure that can occur when taking ketoconazole. The patient understands that monitoring of LFTs may be required, especially at baseline. The patient verbalized understanding of the proper use and possible adverse effects of ketoconazole.  All of the patient's questions and concerns were addressed.
Cyclophosphamide Pregnancy And Lactation Text: This medication is Pregnancy Category D and it isn't considered safe during pregnancy. This medication is excreted in breast milk.
Wartpeel Counseling:  I discussed with the patient the risks of Wartpeel including but not limited to erythema, scaling, itching, weeping, crusting, and pain.
Otezla Counseling: The side effects of Otezla were discussed with the patient, including but not limited to worsening or new depression, weight loss, diarrhea, nausea, upper respiratory tract infection, and headache. Patient instructed to call the office should any adverse effect occur.  The patient verbalized understanding of the proper use and possible adverse effects of Otezla.  All the patient's questions and concerns were addressed.
Rituxan Pregnancy And Lactation Text: This medication is Pregnancy Category C and it isn't know if it is safe during pregnancy. It is unknown if this medication is excreted in breast milk but similar antibodies are known to be excreted.
Low Dose Naltrexone Pregnancy And Lactation Text: Naltrexone is pregnancy category C.  There have been no adequate and well-controlled studies in pregnant women.  It should be used in pregnancy only if the potential benefit justifies the potential risk to the fetus.   Limited data indicates that naltrexone is minimally excreted into breastmilk.
Bactrim Counseling:  I discussed with the patient the risks of sulfa antibiotics including but not limited to GI upset, allergic reaction, drug rash, diarrhea, dizziness, photosensitivity, and yeast infections.  Rarely, more serious reactions can occur including but not limited to aplastic anemia, agranulocytosis, methemoglobinemia, blood dyscrasias, liver or kidney failure, lung infiltrates or desquamative/blistering drug rashes.
Acitretin Pregnancy And Lactation Text: This medication is Pregnancy Category X and should not be given to women who are pregnant or may become pregnant in the future. This medication is excreted in breast milk.
Soolantra Counseling: I discussed with the patients the risks of topial Soolantra. This is a medicine which decreases the number of mites and inflammation in the skin. You experience burning, stinging, eye irritation or allergic reactions.  Please call our office if you develop any problems from using this medication.
Siliq Counseling:  I discussed with the patient the risks of Siliq including but not limited to new or worsening depression, suicidal thoughts and behavior, immunosuppression, malignancy, posterior leukoencephalopathy syndrome, and serious infections.  The patient understands that monitoring is required including a PPD at baseline and must alert us or the primary physician if symptoms of infection or other concerning signs are noted. There is also a special program designed to monitor depression which is required with Siliq.
Otezla Pregnancy And Lactation Text: This medication is Pregnancy Category C and it isn't known if it is safe during pregnancy. It is unknown if it is excreted in breast milk.
Drysol Counseling:  I discussed with the patient the risks of drysol/aluminum chloride including but not limited to skin rash, itching, irritation, burning.
Dapsone Counseling: I discussed with the patient the risks of dapsone including but not limited to hemolytic anemia, agranulocytosis, rashes, methemoglobinemia, kidney failure, peripheral neuropathy, headaches, GI upset, and liver toxicity.  Patients who start dapsone require monitoring including baseline LFTs and weekly CBCs for the first month, then every month thereafter.  The patient verbalized understanding of the proper use and possible adverse effects of dapsone.  All of the patient's questions and concerns were addressed.
Birth Control Pills Pregnancy And Lactation Text: This medication should be avoided if pregnant and for the first 30 days post-partum.
Litfulo Counseling: I discussed with the patient the risks of Litfulo therapy including but not limited to upper respiratory tract infections, shingles, cold sores, and nausea. Live vaccines should be avoided.  This medication has been linked to serious infections; higher rate of mortality; malignancy and lymphoproliferative disorders; major adverse cardiovascular events; thrombosis; gastrointestinal perforations; neutropenia; lymphopenia; anemia; liver enzyme elevations; and lipid elevations.
Tranexamic Acid Counseling:  Patient advised of the small risk of bleeding problems with tranexamic acid. They were also instructed to call if they developed any nausea, vomiting or diarrhea. All of the patient's questions and concerns were addressed.
Odomzo Counseling- I discussed with the patient the risks of Odomzo including but not limited to nausea, vomiting, diarrhea, constipation, weight loss, changes in the sense of taste, decreased appetite, muscle spasms, and hair loss.  The patient verbalized understanding of the proper use and possible adverse effects of Odomzo.  All of the patient's questions and concerns were addressed.
Hydroxyzine Pregnancy And Lactation Text: This medication is not safe during pregnancy and should not be taken. It is also excreted in breast milk and breast feeding isn't recommended.
Metronidazole Counseling:  I discussed with the patient the risks of metronidazole including but not limited to seizures, nausea/vomiting, a metallic taste in the mouth, nausea/vomiting and severe allergy.
Tremfya Counseling: I discussed with the patient the risks of guselkumab including but not limited to immunosuppression, serious infections, and drug reactions.  The patient understands that monitoring is required including a PPD at baseline and must alert us or the primary physician if symptoms of infection or other concerning signs are noted.
Litfulo Pregnancy And Lactation Text: Based on animal studies, Lifulo may cause embryo-fetal harm when administered to pregnant women.  The medication should not be used in pregnancy.  Breastfeeding is not recommended during treatment.
Niacinamide Counseling: I recommended taking niacin or niacinamide, also know as vitamin B3, twice daily. Recent evidence suggests that taking vitamin B3 (500 mg twice daily) can reduce the risk of actinic keratoses and non-melanoma skin cancers. Side effects of vitamin B3 include flushing and headache.
Opioid Counseling: I discussed with the patient the potential side effects of opioids including but not limited to addiction, altered mental status, and depression. I stressed avoiding alcohol, benzodiazepines, muscle relaxants and sleep aids unless specifically okayed by a physician. The patient verbalized understanding of the proper use and possible adverse effects of opioids. All of the patient's questions and concerns were addressed. They were instructed to flush the remaining pills down the toilet if they did not need them for pain.
Cyclosporine Counseling:  I discussed with the patient the risks of cyclosporine including but not limited to hypertension, gingival hyperplasia,myelosuppression, immunosuppression, liver damage, kidney damage, neurotoxicity, lymphoma, and serious infections. The patient understands that monitoring is required including baseline blood pressure, CBC, CMP, lipid panel and uric acid, and then 1-2 times monthly CMP and blood pressure.
Spironolactone Counseling: Patient advised regarding risks of diarrhea, abdominal pain, hyperkalemia, birth defects (for female patients), liver toxicity and renal toxicity. The patient may need blood work to monitor liver and kidney function and potassium levels while on therapy. The patient verbalized understanding of the proper use and possible adverse effects of spironolactone.  All of the patient's questions and concerns were addressed.
Ketoconazole Pregnancy And Lactation Text: This medication is Pregnancy Category C and it isn't know if it is safe during pregnancy. It is also excreted in breast milk and breast feeding isn't recommended.
Klisyri Counseling:  I discussed with the patient the risks of Klisyri including but not limited to erythema, scaling, itching, weeping, crusting, and pain.
Tranexamic Acid Pregnancy And Lactation Text: It is unknown if this medication is safe during pregnancy or breast feeding.
Dapsone Pregnancy And Lactation Text: This medication is Pregnancy Category C and is not considered safe during pregnancy or breast feeding.
Bactrim Pregnancy And Lactation Text: This medication is Pregnancy Category D and is known to cause fetal risk.  It is also excreted in breast milk.
Bexarotene Counseling:  I discussed with the patient the risks of bexarotene including but not limited to hair loss, dry lips/skin/eyes, liver abnormalities, hyperlipidemia, pancreatitis, depression/suicidal ideation, photosensitivity, drug rash/allergic reactions, hypothyroidism, anemia, leukopenia, infection, cataracts, and teratogenicity.  Patient understands that they will need regular blood tests to check lipid profile, liver function tests, white blood cell count, thyroid function tests and pregnancy test if applicable.
Azelaic Acid Pregnancy And Lactation Text: This medication is considered safe during pregnancy and breast feeding.
Cimzia Counseling:  I discussed with the patient the risks of Cimzia including but not limited to immunosuppression, allergic reactions and infections.  The patient understands that monitoring is required including a PPD at baseline and must alert us or the primary physician if symptoms of infection or other concerning signs are noted.
Tetracycline Counseling: Patient counseled regarding possible photosensitivity and increased risk for sunburn.  Patient instructed to avoid sunlight, if possible.  When exposed to sunlight, patients should wear protective clothing, sunglasses, and sunscreen.  The patient was instructed to call the office immediately if the following severe adverse effects occur:  hearing changes, easy bruising/bleeding, severe headache, or vision changes.  The patient verbalized understanding of the proper use and possible adverse effects of tetracycline.  All of the patient's questions and concerns were addressed. Patient understands to avoid pregnancy while on therapy due to potential birth defects.
Topical Metronidazole Counseling: Metronidazole is a topical antibiotic medication. You may experience burning, stinging, redness, or allergic reactions.  Please call our office if you develop any problems from using this medication.
Cephalexin Counseling: I counseled the patient regarding use of cephalexin as an antibiotic for prophylactic and/or therapeutic purposes. Cephalexin (commonly prescribed under brand name Keflex) is a cephalosporin antibiotic which is active against numerous classes of bacteria, including most skin bacteria. Side effects may include nausea, diarrhea, gastrointestinal upset, rash, hives, yeast infections, and in rare cases, hepatitis, kidney disease, seizures, fever, confusion, neurologic symptoms, and others. Patients with severe allergies to penicillin medications are cautioned that there is about a 10% incidence of cross-reactivity with cephalosporins. When possible, patients with penicillin allergies should use alternatives to cephalosporins for antibiotic therapy.
Bexarotene Pregnancy And Lactation Text: This medication is Pregnancy Category X and should not be given to women who are pregnant or may become pregnant. This medication should not be used if you are breast feeding.
Protopic Counseling: Patient may experience a mild burning sensation during topical application. Protopic is not approved in children less than 2 years of age. There have been case reports of hematologic and skin malignancies in patients using topical calcineurin inhibitors although causality is questionable.
Albendazole Counseling:  I discussed with the patient the risks of albendazole including but not limited to cytopenia, kidney damage, nausea/vomiting and severe allergy.  The patient understands that this medication is being used in an off-label manner.
Cimzia Pregnancy And Lactation Text: This medication crosses the placenta but can be considered safe in certain situations. Cimzia may be excreted in breast milk.
Metronidazole Pregnancy And Lactation Text: This medication is Pregnancy Category B and considered safe during pregnancy.  It is also excreted in breast milk.
Soolantra Pregnancy And Lactation Text: This medication is Pregnancy Category C. This medication is considered safe during breast feeding.
Oxybutynin Counseling:  I discussed with the patient the risks of oxybutynin including but not limited to skin rash, drowsiness, dry mouth, difficulty urinating, and blurred vision.
Hyrimoz Counseling:  I discussed with the patient the risks of adalimumab including but not limited to myelosuppression, immunosuppression, autoimmune hepatitis, demyelinating diseases, lymphoma, and serious infections.  The patient understands that monitoring is required including a PPD at baseline and must alert us or the primary physician if symptoms of infection or other concerning signs are noted.
Spevigo Counseling: I discussed with the patient the risks of Spevigo including but not limited to fatigue, nasuea, vomiting, headache, pruritus, urinary tract infection, an infusion related reactions.  The patient understands that monitoring is required including screening for tuberculosis at baseline and yearly screening thereafter while continuing Spevigo therapy. They should contact us if symptoms of infection or other concerning signs are noted.
Spevigo Pregnancy And Lactation Text: The risk during pregnancy and breastfeeding is uncertain with this medication. This medication does cross the placenta. It is unknown if this medication is found in breast milk.

## 2024-08-27 NOTE — PROCEDURE: ADDITIONAL NOTES
Detail Level: Simple
Additional Notes: 8/24: minimally dermatographic today. Patient states that MTX with xolair showed great improvement, then discontinued MTX for GI upset, but pt states GI issues haven’t resolved with discontinuation. Restarting MTX with xolair, given labs come back normal. Counseled to discontinue allopurinol, and lower alcohol consumption for gout. Ordering hepatic function panel and CBC with Chem14 panel.
Render Risk Assessment In Note?: no

## 2024-08-30 ENCOUNTER — OUTPATIENT INFUSION SERVICES (OUTPATIENT)
Dept: ONCOLOGY | Facility: MEDICAL CENTER | Age: 89
End: 2024-08-30
Attending: INTERNAL MEDICINE
Payer: MEDICARE

## 2024-08-30 VITALS
HEART RATE: 66 BPM | HEIGHT: 67 IN | RESPIRATION RATE: 18 BRPM | TEMPERATURE: 98.3 F | DIASTOLIC BLOOD PRESSURE: 64 MMHG | WEIGHT: 198.85 LBS | SYSTOLIC BLOOD PRESSURE: 132 MMHG | BODY MASS INDEX: 31.21 KG/M2 | OXYGEN SATURATION: 96 %

## 2024-08-30 DIAGNOSIS — L50.8 OTHER URTICARIA: ICD-10-CM

## 2024-08-30 PROCEDURE — 96372 THER/PROPH/DIAG INJ SC/IM: CPT

## 2024-08-30 PROCEDURE — 700111 HCHG RX REV CODE 636 W/ 250 OVERRIDE (IP): Mod: JZ,JG | Performed by: INTERNAL MEDICINE

## 2024-08-30 RX ADMIN — OMALIZUMAB 300 MG: 150 INJECTION, SOLUTION SUBCUTANEOUS at 11:35

## 2024-08-30 ASSESSMENT — FIBROSIS 4 INDEX: FIB4 SCORE: 1.27

## 2024-08-30 NOTE — PROGRESS NOTES
Pt arrived ambulatory to [facility] for Xolair injections for chronic urticaria. POC discussed with pt and he agrees with plan.      Epi-pen expires 08/2025. Pt medicated per MAR. Pt monitored x 30 minutes post Xolair injections. Pt tolerated treatment without s/s adverse reaction.      Pt discharged to self care, [facility]. Scheduling emailed for future appointments.

## 2024-09-24 ENCOUNTER — APPOINTMENT (RX ONLY)
Dept: URBAN - METROPOLITAN AREA CLINIC 4 | Facility: CLINIC | Age: 89
Setting detail: DERMATOLOGY
End: 2024-09-24

## 2024-09-24 DIAGNOSIS — L30.8 OTHER SPECIFIED DERMATITIS: ICD-10-CM

## 2024-09-24 DIAGNOSIS — L82.1 OTHER SEBORRHEIC KERATOSIS: ICD-10-CM

## 2024-09-24 PROBLEM — D48.5 NEOPLASM OF UNCERTAIN BEHAVIOR OF SKIN: Status: ACTIVE | Noted: 2024-09-24

## 2024-09-24 PROCEDURE — ? COUNSELING

## 2024-09-24 PROCEDURE — ? PRESCRIPTION MEDICATION MANAGEMENT

## 2024-09-24 PROCEDURE — ? PRESCRIPTION

## 2024-09-24 PROCEDURE — ? ORDER TESTS

## 2024-09-24 PROCEDURE — ? BIOPSY BY SHAVE METHOD

## 2024-09-24 PROCEDURE — ? MEDICATION COUNSELING

## 2024-09-24 PROCEDURE — 99214 OFFICE O/P EST MOD 30 MIN: CPT | Mod: 25

## 2024-09-24 PROCEDURE — 11102 TANGNTL BX SKIN SINGLE LES: CPT

## 2024-09-24 PROCEDURE — ? ADDITIONAL NOTES

## 2024-09-24 RX ORDER — METHOTREXATE SODIUM 2.5 MG/1
1 TABLET ORAL
Qty: 36 | Refills: 7 | Status: ERX

## 2024-09-24 ASSESSMENT — LOCATION ZONE DERM: LOCATION ZONE: TRUNK

## 2024-09-24 ASSESSMENT — LOCATION SIMPLE DESCRIPTION DERM
LOCATION SIMPLE: CHEST
LOCATION SIMPLE: RIGHT UPPER BACK
LOCATION SIMPLE: ABDOMEN

## 2024-09-24 ASSESSMENT — LOCATION DETAILED DESCRIPTION DERM
LOCATION DETAILED: RIGHT SUPERIOR MEDIAL UPPER BACK
LOCATION DETAILED: LEFT INFERIOR FLANK
LOCATION DETAILED: STERNUM

## 2024-09-24 NOTE — PROCEDURE: ADDITIONAL NOTES
Detail Level: Simple
Additional Notes: 9/24/24 Patient reports unchanged itch, suggests going back to his previous dose of 15 mg/week MTX + Xolair [id number]. Pending normal blood work results, we will start again on 15mg/week MTX and continue Xolair and topical clobetasol as needed.
Render Risk Assessment In Note?: no

## 2024-09-24 NOTE — PROCEDURE: PRESCRIPTION MEDICATION MANAGEMENT
Continue Regimen: Xolair q4 weeks (managed by allergy)\\nCerave itch cream
Discontinue Regimen: Methotrexate 4x weekly (10 mg/wk)
Detail Level: Zone
Initiate Treatment: Methotrexate 6x weekly (15 mg/wk) pending blood work results
Render In Strict Bullet Format?: No

## 2024-09-25 ENCOUNTER — RX ONLY (OUTPATIENT)
Age: 89
Setting detail: RX ONLY
End: 2024-09-25

## 2024-09-25 RX ORDER — METHOTREXATE SODIUM 2.5 MG/1
1 TABLET ORAL
Qty: 24 | Refills: 3 | Status: ERX

## 2024-09-27 ENCOUNTER — OUTPATIENT INFUSION SERVICES (OUTPATIENT)
Dept: ONCOLOGY | Facility: MEDICAL CENTER | Age: 89
End: 2024-09-27
Attending: INTERNAL MEDICINE
Payer: MEDICARE

## 2024-09-27 VITALS
BODY MASS INDEX: 31.36 KG/M2 | SYSTOLIC BLOOD PRESSURE: 128 MMHG | WEIGHT: 195.11 LBS | HEART RATE: 61 BPM | RESPIRATION RATE: 18 BRPM | TEMPERATURE: 97.6 F | DIASTOLIC BLOOD PRESSURE: 60 MMHG | OXYGEN SATURATION: 97 % | HEIGHT: 66 IN

## 2024-09-27 DIAGNOSIS — L50.8 OTHER URTICARIA: ICD-10-CM

## 2024-09-27 PROCEDURE — 96372 THER/PROPH/DIAG INJ SC/IM: CPT

## 2024-09-27 PROCEDURE — 700111 HCHG RX REV CODE 636 W/ 250 OVERRIDE (IP): Mod: JZ,JG | Performed by: INTERNAL MEDICINE

## 2024-09-27 RX ADMIN — OMALIZUMAB 300 MG: 150 INJECTION, SOLUTION SUBCUTANEOUS at 11:53

## 2024-09-27 ASSESSMENT — FIBROSIS 4 INDEX: FIB4 SCORE: 1.27

## 2024-09-27 NOTE — PROGRESS NOTES
James came into infusion services today for xolair injection. No complaints. Tolerated past injections with no s/sx of reaction. Epi=pen expiration on 8/2025. Injections given in the back of left and right arm SQ, tolerated well. 30min observation performed with no s/sx of reaction. Pt has future appointments. Discharged to self care.

## 2024-10-24 ENCOUNTER — APPOINTMENT (RX ONLY)
Dept: URBAN - METROPOLITAN AREA CLINIC 4 | Facility: CLINIC | Age: 89
Setting detail: DERMATOLOGY
End: 2024-10-24

## 2024-10-24 DIAGNOSIS — L30.8 OTHER SPECIFIED DERMATITIS: ICD-10-CM

## 2024-10-24 PROCEDURE — 99213 OFFICE O/P EST LOW 20 MIN: CPT

## 2024-10-24 PROCEDURE — ? PRESCRIPTION MEDICATION MANAGEMENT

## 2024-10-24 PROCEDURE — ? MEDICATION COUNSELING

## 2024-10-24 PROCEDURE — ? COUNSELING

## 2024-10-24 PROCEDURE — ? ADDITIONAL NOTES

## 2024-10-24 ASSESSMENT — LOCATION SIMPLE DESCRIPTION DERM
LOCATION SIMPLE: CHEST
LOCATION SIMPLE: RIGHT UPPER BACK

## 2024-10-24 ASSESSMENT — LOCATION DETAILED DESCRIPTION DERM
LOCATION DETAILED: RIGHT SUPERIOR MEDIAL UPPER BACK
LOCATION DETAILED: STERNUM

## 2024-10-24 ASSESSMENT — LOCATION ZONE DERM: LOCATION ZONE: TRUNK

## 2024-10-24 NOTE — PROCEDURE: ADDITIONAL NOTES
Detail Level: Simple
Additional Notes: 10/24: pt states that increased MTX has not changed his constant itch, worse on the mornings, and it disrupts his sleep. Pt is on blood thinners, and also states hx of TIA managed by Dr. Palma. 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 Discussed risks at length, pt verbalized understanding. His last MTX dose was Saturday, so discussed with pt to not take his MTX this Saturday, and begin Cibinqo November 2nd. Given 100 mg Cibinqo samples.
Render Risk Assessment In Note?: no

## 2024-10-24 NOTE — PROCEDURE: PRESCRIPTION MEDICATION MANAGEMENT
Continue Regimen: Cerave itch cream
Discontinue Regimen: Methotrexate 6x weekly (15 mg/wk)\\nXolair
Samples Given: Cibinqo
Detail Level: Zone
Initiate Treatment: 100 mg Cibinqo qd
Render In Strict Bullet Format?: No

## 2024-10-25 ENCOUNTER — APPOINTMENT (OUTPATIENT)
Dept: ONCOLOGY | Facility: MEDICAL CENTER | Age: 89
End: 2024-10-25
Attending: INTERNAL MEDICINE
Payer: MEDICARE

## 2025-02-08 ENCOUNTER — APPOINTMENT (OUTPATIENT)
Dept: RADIOLOGY | Facility: MEDICAL CENTER | Age: 89
End: 2025-02-08
Attending: STUDENT IN AN ORGANIZED HEALTH CARE EDUCATION/TRAINING PROGRAM
Payer: MEDICARE

## 2025-02-08 ENCOUNTER — HOSPITAL ENCOUNTER (EMERGENCY)
Facility: MEDICAL CENTER | Age: 89
End: 2025-02-08
Attending: STUDENT IN AN ORGANIZED HEALTH CARE EDUCATION/TRAINING PROGRAM
Payer: MEDICARE

## 2025-02-08 VITALS
DIASTOLIC BLOOD PRESSURE: 64 MMHG | HEART RATE: 66 BPM | BODY MASS INDEX: 30.31 KG/M2 | WEIGHT: 200 LBS | RESPIRATION RATE: 19 BRPM | OXYGEN SATURATION: 94 % | HEIGHT: 68 IN | TEMPERATURE: 98.6 F | SYSTOLIC BLOOD PRESSURE: 119 MMHG
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
## 2025-02-08 DIAGNOSIS — S80.212A ABRASION, KNEE, LEFT, INITIAL ENCOUNTER: ICD-10-CM

## 2025-02-08 DIAGNOSIS — S09.90XA CLOSED HEAD INJURY, INITIAL ENCOUNTER: ICD-10-CM

## 2025-02-08 DIAGNOSIS — S51.811A SKIN TEAR OF RIGHT FOREARM WITHOUT COMPLICATION, INITIAL ENCOUNTER: ICD-10-CM

## 2025-02-08 LAB
ALBUMIN SERPL BCP-MCNC: 3.9 G/DL (ref 3.2–4.9)
ALBUMIN/GLOB SERPL: 1.7 G/DL
ALP SERPL-CCNC: 91 U/L (ref 30–99)
ALT SERPL-CCNC: 7 U/L (ref 2–50)
ANION GAP SERPL CALC-SCNC: 10 MMOL/L (ref 7–16)
APTT PPP: 30 SEC (ref 24.7–36)
AST SERPL-CCNC: 17 U/L (ref 12–45)
BASOPHILS # BLD AUTO: 0.3 % (ref 0–1.8)
BASOPHILS # BLD: 0.02 K/UL (ref 0–0.12)
BILIRUB SERPL-MCNC: 0.2 MG/DL (ref 0.1–1.5)
BUN SERPL-MCNC: 23 MG/DL (ref 8–22)
CALCIUM ALBUM COR SERPL-MCNC: 9.8 MG/DL (ref 8.5–10.5)
CALCIUM SERPL-MCNC: 9.7 MG/DL (ref 8.5–10.5)
CHLORIDE SERPL-SCNC: 107 MMOL/L (ref 96–112)
CO2 SERPL-SCNC: 22 MMOL/L (ref 20–33)
CREAT SERPL-MCNC: 0.99 MG/DL (ref 0.5–1.4)
EKG IMPRESSION: NORMAL
EOSINOPHIL # BLD AUTO: 0.17 K/UL (ref 0–0.51)
EOSINOPHIL NFR BLD: 2.9 % (ref 0–6.9)
ERYTHROCYTE [DISTWIDTH] IN BLOOD BY AUTOMATED COUNT: 47.5 FL (ref 35.9–50)
GFR SERPLBLD CREATININE-BSD FMLA CKD-EPI: 73 ML/MIN/1.73 M 2
GLOBULIN SER CALC-MCNC: 2.3 G/DL (ref 1.9–3.5)
GLUCOSE SERPL-MCNC: 145 MG/DL (ref 65–99)
HCT VFR BLD AUTO: 38.7 % (ref 42–52)
HGB BLD-MCNC: 13.1 G/DL (ref 14–18)
IMM GRANULOCYTES # BLD AUTO: 0.04 K/UL (ref 0–0.11)
IMM GRANULOCYTES NFR BLD AUTO: 0.7 % (ref 0–0.9)
INR PPP: 1.24 (ref 0.87–1.13)
LYMPHOCYTES # BLD AUTO: 1.13 K/UL (ref 1–4.8)
LYMPHOCYTES NFR BLD: 19.4 % (ref 22–41)
MCH RBC QN AUTO: 31 PG (ref 27–33)
MCHC RBC AUTO-ENTMCNC: 33.9 G/DL (ref 32.3–36.5)
MCV RBC AUTO: 91.5 FL (ref 81.4–97.8)
MONOCYTES # BLD AUTO: 0.55 K/UL (ref 0–0.85)
MONOCYTES NFR BLD AUTO: 9.5 % (ref 0–13.4)
NEUTROPHILS # BLD AUTO: 3.91 K/UL (ref 1.82–7.42)
NEUTROPHILS NFR BLD: 67.2 % (ref 44–72)
NRBC # BLD AUTO: 0 K/UL
NRBC BLD-RTO: 0 /100 WBC (ref 0–0.2)
PLATELET # BLD AUTO: 198 K/UL (ref 164–446)
PMV BLD AUTO: 9.8 FL (ref 9–12.9)
POTASSIUM SERPL-SCNC: 4.6 MMOL/L (ref 3.6–5.5)
PROT SERPL-MCNC: 6.2 G/DL (ref 6–8.2)
PROTHROMBIN TIME: 15.6 SEC (ref 12–14.6)
RBC # BLD AUTO: 4.23 M/UL (ref 4.7–6.1)
SODIUM SERPL-SCNC: 139 MMOL/L (ref 135–145)
WBC # BLD AUTO: 5.8 K/UL (ref 4.8–10.8)

## 2025-02-08 PROCEDURE — 73560 X-RAY EXAM OF KNEE 1 OR 2: CPT | Mod: LT

## 2025-02-08 PROCEDURE — 700111 HCHG RX REV CODE 636 W/ 250 OVERRIDE (IP): Mod: JZ | Performed by: STUDENT IN AN ORGANIZED HEALTH CARE EDUCATION/TRAINING PROGRAM

## 2025-02-08 PROCEDURE — 85025 COMPLETE CBC W/AUTO DIFF WBC: CPT

## 2025-02-08 PROCEDURE — 304217 HCHG IRRIGATION SYSTEM

## 2025-02-08 PROCEDURE — 73100 X-RAY EXAM OF WRIST: CPT | Mod: RT

## 2025-02-08 PROCEDURE — 80053 COMPREHEN METABOLIC PANEL: CPT

## 2025-02-08 PROCEDURE — 73090 X-RAY EXAM OF FOREARM: CPT | Mod: RT

## 2025-02-08 PROCEDURE — 85610 PROTHROMBIN TIME: CPT

## 2025-02-08 PROCEDURE — 304999 HCHG REPAIR-SIMPLE/INTERMED LEVEL 1

## 2025-02-08 PROCEDURE — 36415 COLL VENOUS BLD VENIPUNCTURE: CPT

## 2025-02-08 PROCEDURE — 93005 ELECTROCARDIOGRAM TRACING: CPT | Mod: TC | Performed by: STUDENT IN AN ORGANIZED HEALTH CARE EDUCATION/TRAINING PROGRAM

## 2025-02-08 PROCEDURE — 700101 HCHG RX REV CODE 250: Performed by: STUDENT IN AN ORGANIZED HEALTH CARE EDUCATION/TRAINING PROGRAM

## 2025-02-08 PROCEDURE — 303747 HCHG EXTRA SUTURE

## 2025-02-08 PROCEDURE — 96374 THER/PROPH/DIAG INJ IV PUSH: CPT

## 2025-02-08 PROCEDURE — 303353 HCHG DERMABOND SKIN ADHESIVE

## 2025-02-08 PROCEDURE — 99285 EMERGENCY DEPT VISIT HI MDM: CPT

## 2025-02-08 PROCEDURE — 85730 THROMBOPLASTIN TIME PARTIAL: CPT

## 2025-02-08 PROCEDURE — 70450 CT HEAD/BRAIN W/O DYE: CPT

## 2025-02-08 PROCEDURE — 72125 CT NECK SPINE W/O DYE: CPT

## 2025-02-08 RX ORDER — LIDOCAINE HYDROCHLORIDE AND EPINEPHRINE 10; 10 MG/ML; UG/ML
20 INJECTION, SOLUTION INFILTRATION; PERINEURAL ONCE
Status: COMPLETED | OUTPATIENT
Start: 2025-02-08 | End: 2025-02-08

## 2025-02-08 RX ADMIN — FENTANYL CITRATE 50 MCG: 50 INJECTION, SOLUTION INTRAMUSCULAR; INTRAVENOUS at 21:21

## 2025-02-08 RX ADMIN — LIDOCAINE HYDROCHLORIDE AND EPINEPHRINE 20 ML: 10; 10 INJECTION, SOLUTION INFILTRATION; PERINEURAL at 21:00

## 2025-02-08 ASSESSMENT — FIBROSIS 4 INDEX: FIB4 SCORE: 1.27

## 2025-02-09 NOTE — ED NOTES
Pt right arm wounds and left knee dressed with nonadherent dressing and gauze. Steri strips intact to RUE. PMS intact to all extremities, CR<3.

## 2025-02-09 NOTE — ED NOTES
Pt aox4, skin pink warm and dry, airway patent, rr even and unlabored, NAD noted. No new complaints. Pt vss. Pt given discharge instructions without further questions. Pt assisted to wheelchair and to car without new incident or distress in stable condition.

## 2025-02-09 NOTE — ED NOTES
Bedside report received from off going RN/tech: RobertaRN and Flor RN assumed care of patient.  POC discussed with patient. Call light within reach, all needs addressed at this time.       Fall risk interventions in place: Keep floor surfaces clean and dry (all applicable per [place] Fall risk assessment)   Continuous monitoring: Cardiac Leads, Pulse Ox, or Blood Pressure  IVF/IV medications: Not Applicable   Oxygen: Room Air  Bedside sitter: Not Applicable   Isolation: Not Applicable

## 2025-02-09 NOTE — ED PROVIDER NOTES
ER Provider Note    Scribed for Kelly Jason M.D. by Paolo Amin. 2/8/2025   6:20 PM    Primary Care Provider: Patrica Garcia M.D.    CHIEF COMPLAINT  Chief Complaint   Patient presents with    T-5000 Head Injury     MGLF, +headstrike, +thinners, -LOC.     EXTERNAL RECORDS REVIEWED  Inpatient Notes admitted 2018 for possible CVA, diagnosed with TIA,    HPI/ROS  LIMITATION TO HISTORY   Select: : None  OUTSIDE HISTORIAN(S):  EMS  at bedside to confirm sequence of events and collateral information provided. See HPI below.     Later, I spoke with the patient's spouse who notes he was in his normal state of health, prior to the fall.    James Hoffman is a 89 y.o. male who presents to the ED via EMS for evaluation of a head injury onset prior to arrival. EMS reports the patient had a ground level fall at his home with a positive head strike. The patient reports he was bending down trying to put some dishes in his  and as he bent down, he fell to the ground. He denies feeling weak or headache prior to the fall. He denies loss of consciousness. EMS denies neck pain or back pain.  EMS notes the patient is on Eliquis, however they did not ask what for. He reports a history of CVA, noting he regained his strength after his stroke and ambulates with a walker at baseline.  Last dose of Eliquis this evening denies history of A-fib.      PAST MEDICAL HISTORY  Past Medical History:   Diagnosis Date    Allergic rhinitis     ASTHMA     Basal cell carcinoma of face 05/2004    Dr. Lucas    Bowel habit changes 01/03/2019    Constipation    Cancer ([facility]) approx 2015 last    Basal cell of face.    Cataract 2018    Bilateral phaco with IOL    Colon polyp, hyperplastic 5/04    CVA (cerebral vascular accident) ([facility]) 01/2003    Residual diplopia, Dr. Cleaning    Diabetes ([facility])     diet controlled. 1/3/19-does not check glucose at home.    Dysphagia 2013    Dr. Joseph, vallecular lesion removed    GOUT     Heart burn     treated  with zantac.    Hemorrhagic disorder (HCC)     Due to plavix. Bleeds and bruises readily.     HYPOTHYROIDISM 2014    Indigestion     treated with zantac    Left knee pain 01/03/2019    with activity    Rosabushra Lucas    TIA 12/2000, 2017       SURGICAL HISTORY  Past Surgical History:   Procedure Laterality Date    KNEE ARTHROPLASTY TOTAL Left 1/14/2019    Procedure: KNEE ARTHROPLASTY TOTAL;  Surgeon: Jean Claude Meyer M.D.;  Location: SURGERY [facility];  Service: Orthopedics    MICROLARYNGOSCOPY  1/21/2014    Performed by Jimbo Joseph M.D. at SURGERY SAME DAY [facility]    KNEE ARTHROSCOPY Left 2013    COLONOSCOPY WITH POLYP  5/2004    Hyperplastic    RECONSTRUCTION, KNEE, ACL Right 10/2002     Dr. Engle    TONSILLECTOMY  as child       FAMILY HISTORY  Family History   Problem Relation Age of Onset    Diabetes Mother     Genetic Disorder Mother         dementia    Heart Disease Sister     Cancer Sister         breast cancer    Diabetes Maternal Aunt     Diabetes Maternal Grandmother     Diabetes Maternal Grandfather     Genetic Disorder Son         ALS       SOCIAL HISTORY   reports that he quit smoking about 62 years ago. His smoking use included cigarettes. He started smoking about 72 years ago. He has a 10 pack-year smoking history. He has never used smokeless tobacco. He reports current alcohol use. He reports that he does not use drugs.      CURRENT MEDICATIONS  Discharge Medication List as of 2/8/2025 11:03 PM        CONTINUE these medications which have NOT CHANGED    Details   doxazosin (CARDURA) 4 MG Tab Take 4 mg by mouth every day., Historical Med      triamcinolone acetonide (KENALOG) 0.1 % Cream Apply 1 Application topically 2 times a day., Historical Med      POLYETHYLENE GLYCOL 3350-GRX PO Take 17 g by mouth 1 time a day as needed (constipation)., Historical Med      Azelaic Acid (FINACEA) 15 % Gel Apply 1 Application topically every day., Historical Med      levothyroxine  "(SYNTHROID) 125 MCG Tab Take 150 mcg by mouth every morning on an empty stomach., Historical Med      multivitamin (THERAGRAN) Tab Take 1 Tab by mouth every day., Historical Med      Probiotic Product (PROBIOTIC PO) Take 1 Cap by mouth every day., Historical Med      metronidazole (METROGEL) 1 % gel Apply 1 Application to affected area(s) every day. Apply to nose, Historical Med      ranitidine (ZANTAC) 300 MG tablet Take 300 mg by mouth every evening., Historical Med      doxycycline (VIBRAMYCIN) 100 MG Cap Take 100 mg by mouth 2 times a day. ACNE, Historical Med      fluticasone (FLONASE) 50 MCG/ACT nasal spray Spray 2 Sprays in nose every day., Disp-3 Bottle, R-1, Normal      clopidogrel (PLAVIX) 75 MG TABS Take 1 Tab by mouth every day., Disp-90 Tab, R-3, Normal      FLOVENT  MCG/ACT AERO INHALE 2 PUFFS BY MOUTH 2TIMES A DAY., Disp-1 Inhaler, R-8, Normal             ALLERGIES  Allergies   Allergen Reactions    Penicillins Anaphylaxis    Neosporin [Bacitracin-Polymyxin B] Unspecified     Turned his arm red         PHYSICAL EXAM  BP (!) 132/98   Pulse 75   Temp 37 °C (98.6 °F) (Temporal)   Resp 18   Ht 1.727 m (5' 8\")   Wt 90.7 kg (200 lb)   SpO2 97%   BMI 30.41 kg/m²      General: Chronically ill appearing.   Head: Normocephalic. Right parietal hematoma. Dried blood to face with no obvious laceration.   Eyes: Extraocular motion intact  Face:  Dentition intact.  No malalignment.  Dry epistaxis without active bleeding, without septal hematoma  Neck: Supple, no rigidity.  No midline tenderness.    Cardiovascular: Regular rate and rhythm no murmurs rubs or gallops  Respiratory: Clear to auscultation bilaterally, equal chest rise and fall, no increased work of breathing  Abdomen: Soft nontender no guarding  Musculoskeletal: Warm and well perfused, no peripheral edema. 2 partial avulsion/skin tear to the right forearm; 10 total cm on distal one and upper one is about 8 cm.  Distal shows small amount of " periosteum, which is intact.  Proximal shows exposed muscle belly however the muscular fascia is intact  Neuro: Alert, 5 out of 5 strength in upper and lower extremities, sensation tact to light touch x 4 cranial nerves II through XII intact via passive observation.  Integumentary: No wounds or rashes       DIAGNOSTIC STUDIES    Labs:   Labs Reviewed   CBC WITH DIFFERENTIAL - Abnormal; Notable for the following components:       Result Value    RBC 4.23 (*)     Hemoglobin 13.1 (*)     Hematocrit 38.7 (*)     Lymphocytes 19.40 (*)     All other components within normal limits   COMP METABOLIC PANEL - Abnormal; Notable for the following components:    Glucose 145 (*)     Bun 23 (*)     All other components within normal limits   PROTHROMBIN TIME - Abnormal; Notable for the following components:    PT 15.6 (*)     INR 1.24 (*)     All other components within normal limits   APTT   ESTIMATED GFR     Labs notable for mild anemia with no recent baseline, INR is slightly elevated, likely due to Eliquis.  Remainder labs are unrevealing.    EKG:   I have independently interpreted this EKG as detailed above.  Results for orders placed or performed during the hospital encounter of 25   EKG (NOW)   Result Value Ref Range    Report       [facility] Emergency Dept.    Test Date:  2025  Pt Name:    CONOR SHI                      Department: ER  MRN:        [id number]                      Room:        18  Gender:     Male                         Technician: [id number]  :        [id number]                   Requested By:JOEY DE LA ROSA  Order #:    [id number]                    Reading MD: Joey De La Rosa    Measurements  Intervals                                Axis  Rate:       71                           P:          0  SC:         193                          QRS:        80  QRSD:       100                          T:          17  QT:         421  QTc:        458    Interpretive Statements  sinus rhythm  1st deg AV block. no ST elevations. LImited interpretation due  to artifact. not significantly changed compared 04/25/18  Electronically Signed On 02- 22:51:48 PST by Joey Jason           Radiology:       Radiologist interpretation:   DX-KNEE 2- LEFT   Final Result      No evidence of fracture or dislocation.      DX-WRIST-LIMITED 2- RIGHT   Final Result      No evidence of fracture or dislocation.      DX-FOREARM RIGHT   Final Result      No evidence of fracture or dislocation.      CT-CSPINE WITHOUT PLUS RECONS   Final Result      No acute fracture or dislocation of cervical spine.      Advanced facet degeneration. Grade 1 degenerative anterolisthesis C3-C5.      CT-HEAD W/O   Final Result         NO ACUTE ABNORMALITIES ARE NOTED ON CT SCAN OF THE HEAD.      Findings are consistent with atrophy.  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                         Laceration Repair Procedure Note    Indication: Lacerations    Procedure: The patient was placed in the appropriate position and anesthesia around the lacerations were obtained by infiltration using 8.0 cc of 1% Lidocaine with epinephrine. The wound was minimally contaminated .The area was then irrigated with normal saline. The laceration was closed with 3-0 Ethilon using horizontal mattress sutures. A second laceration was closed with Dermabond and steri strips. The wound area was then dressed with a sterile dressing.      Total repaired wound length: 10 cm.     Other Items: Suture count: 3    The patient tolerated the procedure well.    Complications: None       INITIAL ASSESSMENT COURSE AND PLAN  Care Narrative     6:24 PM - Patient was evaluated at charge desk for TBI. This is a 89 year old man who ws brought in by EMS for evaluation following a ground level fall at his home with a positive head strike. Patient is on Elqius. Pertinent PE findings include: Right parietal hematoma.  Dried blood to face with no obvious laceration.      Suspect nonsyncopal/mechanical fall however given patient's age and comorbidities will obtain EKG, screening labs.  These are reassuring, CT imaging is negative.  X-rays were obtained based on areas of tenderness, no scaphoid tenderness no pain with axial compression of the thumbs.    X-ray showed no fracture.    6:54 PM - Patient was reevaluated at bedside. Discussed radiology results with the patient and informed them that there is no evidence of a brain bleed or neck fracture.     8:51 PM - The patient was reevaluated at bedside.     10:03 PM - The patient was reevaluated at bedside. Laceration repair performed by me as well as with the assistance of MSDeshaun Arriola See note above. Reviewed patient's laboratory, imaging results at the bedside, patient is agreeable to discharge, we discussed strict return precautions, and outpatient follow-up, patient was discharged in no acute distress.  All questions were answered prior to discharge.  Extended discussion with the family, patient, spouse, and daughter, on wound care, return precautions    Given the depth of the wounds, laceration pair was somewhat  difficult, and patient may have trouble healing.  Thus he was referred to wound care for follow-up.    DISPOSITION AND DISCUSSIONS    I have discussed management of the patient with the following physicians and CELY's:  None.    Discussion of management with other QHP or appropriate source(s): None     Escalation of care considered, and ultimately not performed: acute inpatient care management, however at this time, the patient is most appropriate for outpatient management.    Barriers to care at this time, including but not limited to:  None known .       The patient will return for new or worsening symptoms and is stable at the time of discharge.    DISPOSITION:  Patient will be discharged home in stable condition.       FINAL DIAGNOSIS  1. Closed head injury, initial  encounter    2. Ground-level fall    3. Skin tear of right forearm without complication, initial encounter    4. Abrasion, knee, left, initial encounter    5. Laceration repairs    IPaolo (Scribe), am scribing for, and in the presence of, Joey Jason M.D..    Electronically signed by: Paolo Amin (Scribjessica), 2/8/2025    IJoey M.D. personally performed the services described in this documentation, as scribed by Paolo Amin in my presence, and it is both accurate and complete.      The note accurately reflects work and decisions made by me.  Joey Jason M.D.  2/9/2025  1:34 AM

## 2025-02-09 NOTE — ED TRIAGE NOTES
Chief Complaint   Patient presents with    T-5000 Head Injury     MGLF, +headstrike, +thinners, -LOC.     89 y/o male BIB EMS from home after a MGLF at home while putting away dishes. +headstrike, +blood thinners (Eliquis for prior stroke, no deficits). Pt struck the right back of his head, large contusion present, no bleeding. Skin tears/avulsions present to right wrist & right forearm. Superficial skin tear to left knee with minor bleeding. Pt is Aox4, on room air.

## 2025-02-09 NOTE — DISCHARGE INSTRUCTIONS
Follow-up with your primary care doctor in the next several days., continue taking your medications as prescribed.  If James is not acting right, has severe pain, recurrent vomiting or other new concerning symptoms return immediately for reevaluation.    Keep the wound dry for the next 36 hours, after that, you can shower and clean the area gently, do not soak the wound until the skin is fully healed approximately 2 to 3 weeks.  Your sutures need removed in 10-14 days.  Follow-up with your primary care doctor immediately or return to the emergency department if you develop worsening pain, redness, pus, fever or other concerning symptoms for infection.  The scar will become more prominent during the first 6 months, after which she will start to fade until 1 year.  You can reduce the prominence of the scar, by avoiding sun, whether by bandaging the wound, or applying sunscreen once the skin is healed.    Follow-up with wound care if able.  Otherwise, change the dressing daily.

## 2025-02-10 ENCOUNTER — HOSPITAL ENCOUNTER (EMERGENCY)
Facility: MEDICAL CENTER | Age: 89
End: 2025-02-10
Attending: EMERGENCY MEDICINE
Payer: MEDICARE

## 2025-02-10 VITALS
HEIGHT: 68 IN | HEART RATE: 65 BPM | OXYGEN SATURATION: 95 % | WEIGHT: 212.3 LBS | SYSTOLIC BLOOD PRESSURE: 135 MMHG | TEMPERATURE: 98 F | BODY MASS INDEX: 32.18 KG/M2 | DIASTOLIC BLOOD PRESSURE: 84 MMHG | RESPIRATION RATE: 15 BRPM

## 2025-02-10 DIAGNOSIS — Z48.00 ENCOUNTER FOR CHANGE OR REMOVAL OF NONSURGICAL WOUND DRESSING: ICD-10-CM

## 2025-02-10 DIAGNOSIS — S51.811A SKIN TEAR OF RIGHT FOREARM WITHOUT COMPLICATION, INITIAL ENCOUNTER: ICD-10-CM

## 2025-02-10 DIAGNOSIS — Z51.89 ENCOUNTER FOR WOUND RE-CHECK: ICD-10-CM

## 2025-02-10 DIAGNOSIS — S51.811A LACERATION OF RIGHT FOREARM, INITIAL ENCOUNTER: ICD-10-CM

## 2025-02-10 PROCEDURE — 304217 HCHG IRRIGATION SYSTEM

## 2025-02-10 PROCEDURE — 99284 EMERGENCY DEPT VISIT MOD MDM: CPT

## 2025-02-10 ASSESSMENT — FIBROSIS 4 INDEX: FIB4 SCORE: 2.86

## 2025-02-10 NOTE — ED PROVIDER NOTES
ER Provider Note    Scribed for Carlos Morales M.D. by Paolo Amin. 2/10/2025   12:27 PM    Primary Care Provider: Patrica Garcia M.D.    CHIEF COMPLAINT  Chief Complaint   Patient presents with    Wound Re-Check     RFA  Bandages adhered to RFA s/p fall two days ago  Seen at [facility] for initial injury     EXTERNAL RECORDS REVIEWED      HPI/ROS  LIMITATION TO HISTORY   Select: : None  OUTSIDE HISTORIAN(S):  Significant other Patient's wife at bedside    James Hoffman is a 89 y.o. male who presents to the ED for evaluation of wound check. The wife reports the wound dressing was adhered to the wound and she had concerns about removing it herself. The patient reports he was bending down trying to put some dishes in his  and as he bent down, he fell to the ground. She notes he had a skin tear of the right forearm and was seen 2 days ago in the ED for this. She states she called wound care this morning and they declined to see the patient because he still had sutures in.     PAST MEDICAL HISTORY  Past Medical History:   Diagnosis Date    Allergic rhinitis     ASTHMA     Basal cell carcinoma of face 05/2004    Dr. Lucas    Bowel habit changes 01/03/2019    Constipation    Cancer ([facility]) approx 2015 last    Basal cell of face.    Cataract 2018    Bilateral phaco with IOL    Colon polyp, hyperplastic 5/04    CVA (cerebral vascular accident) ([facility]) 01/2003    Residual diplopia, Dr. Cleaning    Diabetes ([facility])     diet controlled. 1/3/19-does not check glucose at home.    Dysphagia 2013    Dr. Joseph, vallecular lesion removed    GOUT     Heart burn     treated with zantac.    Hemorrhagic disorder (HCC)     Due to plavix. Bleeds and bruises readily.     HYPOTHYROIDISM 2014    Indigestion     treated with zantac    Left knee pain 01/03/2019    with activity    Rosacea     Dr. Lucas    TIA 12/2000, 2017       SURGICAL HISTORY  Past Surgical History:   Procedure Laterality Date    KNEE ARTHROPLASTY TOTAL Left 1/14/2019     Procedure: KNEE ARTHROPLASTY TOTAL;  Surgeon: Jean Claude Meyer M.D.;  Location: SURGERY [facility];  Service: Orthopedics    MICROLARYNGOSCOPY  1/21/2014    Performed by Jimbo Joseph M.D. at SURGERY SAME DAY [facility]    KNEE ARTHROSCOPY Left 2013    COLONOSCOPY WITH POLYP  5/2004    Hyperplastic    RECONSTRUCTION, KNEE, ACL Right 10/2002     Dr. Engle    TONSILLECTOMY  as child       FAMILY HISTORY  Family History   Problem Relation Age of Onset    Diabetes Mother     Genetic Disorder Mother         dementia    Heart Disease Sister     Cancer Sister         breast cancer    Diabetes Maternal Aunt     Diabetes Maternal Grandmother     Diabetes Maternal Grandfather     Genetic Disorder Son         ALS       SOCIAL HISTORY   reports that he quit smoking about 62 years ago. His smoking use included cigarettes. He started smoking about 72 years ago. He has a 10 pack-year smoking history. He has never used smokeless tobacco. He reports that he does not currently use alcohol. He reports that he does not use drugs.    CURRENT MEDICATIONS  Previous Medications    AZELAIC ACID (FINACEA) 15 % GEL    Apply 1 Application topically every day.    CLOPIDOGREL (PLAVIX) 75 MG TABS    Take 1 Tab by mouth every day.    DOXAZOSIN (CARDURA) 4 MG TAB    Take 4 mg by mouth every day.    DOXYCYCLINE (VIBRAMYCIN) 100 MG CAP    Take 100 mg by mouth 2 times a day. ACNE    FLOVENT  MCG/ACT AERO    INHALE 2 PUFFS BY MOUTH 2TIMES A DAY.    FLUTICASONE (FLONASE) 50 MCG/ACT NASAL SPRAY    Spray 2 Sprays in nose every day.    LEVOTHYROXINE (SYNTHROID) 125 MCG TAB    Take 150 mcg by mouth every morning on an empty stomach.    METRONIDAZOLE (METROGEL) 1 % GEL    Apply 1 Application to affected area(s) every day. Apply to nose    MULTIVITAMIN (THERAGRAN) TAB    Take 1 Tab by mouth every day.    POLYETHYLENE GLYCOL 3350-GRX PO    Take 17 g by mouth 1 time a day as needed (constipation).    PROBIOTIC PRODUCT (PROBIOTIC PO)    Take  "1 Cap by mouth every day.    RANITIDINE (ZANTAC) 300 MG TABLET    Take 300 mg by mouth every evening.    TRIAMCINOLONE ACETONIDE (KENALOG) 0.1 % CREAM    Apply 1 Application topically 2 times a day.       ALLERGIES  Allergies   Allergen Reactions    Penicillins Anaphylaxis    Neosporin [Bacitracin-Polymyxin B] Unspecified     Turned his arm red         PHYSICAL EXAM  /84   Pulse 65   Temp 36.7 °C (98 °F) (Temporal)   Resp 15   Ht 1.727 m (5' 8\")   Wt 96.3 kg (212 lb 4.9 oz)   SpO2 95%   BMI 32.28 kg/m²      Constitutional: Well-developed and well-nourished. No distress.   HENT: Head is normocephalic and atraumatic.   Cardiovascular: Capillary refill is less than 2 seconds   Pulmonary/Chest: No respiratory distress.    Abdominal: Atraumatic.  Musculoskeletal: Extremities exhibit normal range of motion   Neurological: Awake, alert and oriented to person, place, and time.  Skin: Skin is warm and dry. Multiple healing wounds with sutures and steri strips over the right forearm. No evidence of infection.  Psychiatric: Normal mood and affect. Appropriate for clinical situation      ASSESSMENT AND PLAN    12:27 PM - Patient was evaluated at bedside. His old dressing was removed and there was mild adherence to wound. Wound was irrigated with NS and redressed. I discussed with the patient we will consult case management regarding initiation of home health or referral back to the wound clinic.       ED OBS: No; Patient does not meet criteria for ED Observation.        DISPOSITION AND DISCUSSIONS    I have discussed management of the patient with the following physicians and CELY's:  None    Discussion of management with other QHP or appropriate source(s): Case Management        The patient will return for new or worsening symptoms and is stable at the time of discharge.    The patient is referred to a primary physician for blood pressure management, diabetic screening, and for all other preventative health " concerns.      DISPOSITION:  Patient will be discharged home in stable condition.    FOLLOW UP:  Patrica Garcia M.D.  [address]  [address]  [facility] [id number] [phone]    Schedule an appointment as soon as possible for a visit       [facility], Emergency Dept  [address]  [facility] [id number] [phone]    If symptoms worsen    [facility]  [address]  [facility] [address]  [phone]  Schedule an appointment as soon as possible for a visit           FINAL DIAGNOSIS  1. Encounter for wound re-check    2. Encounter for change or removal of nonsurgical wound dressing        IPaolo (Scribe), am scribing for, and in the presence of, Carlos Morales M.D..    Electronically signed by: Paolo Amin (Barbara), 2/10/2025    ICarlos M.D. personally performed the services described in this documentation, as scribed by Paolo Amin in my presence, and it is both accurate and complete.      The note accurately reflects work and decisions made by me.  Carlos Morales M.D.  2/10/2025  12:44 PM

## 2025-02-10 NOTE — ED NOTES
Old dressing removed, some mild adherence to wound bed observed but was able to be gently removed without much difficulty.     Irrigated with NS and re-dressed with adaptic/petroleum gauze/dry gauze/ and finally kerlix.

## 2025-02-10 NOTE — DISCHARGE PLANNING
ER CM met with pt and spouse at bedside. He is already on services for home health but wife did not remember with who,ER CM called Odalis at Dr Patrica Garcia office and they confirm HH with [facility]. Choice form done 1) [facility] ALREADY ON SERVICE  2) [facility] but stressed anticipate continued service with [facility] as cannot have more than 1 HH and they understand. ER CM will contact [facility] and review Dressing change. PT order. Spoke with Sri at [facility] and advised of Dressing PT addition.  Ambulate with [facility] out of ER. PCP Dr Garcia.   Care Transition Team Assessment    Information Source  Orientation Level: Oriented X4  Information Given By: Patient  Informant's Name: James/Willa  Who is responsible for making decisions for patient? : Patient         Elopement Risk  Legal Hold: No  Ambulatory or Self Mobile in Wheelchair: No-Not an Elopement Risk    Interdisciplinary Discharge Planning  Primary Care Physician: Patrica Garcia  Lives with - Patient's Self Care Capacity: Spouse  Support Systems: Spouse / Significant Other  Do You Take your Prescribed Medications Regularly: Yes  Able to Return to Previous ADL's: Yes  Mobility Issues: Yes (FWW)  Prior Services: Skilled Home Health Services ([facility])    Discharge Preparedness  What is your plan after discharge?: Home health care  What are your discharge supports?: Spouse  Prior Functional Level: Ambulatory, Independent with Activities of Daily Living, Independent with Medication Management, Uses Walker    Functional Assesment  Prior Functional Level: Ambulatory, Independent with Activities of Daily Living, Independent with Medication Management, Uses Walker    Finances  Prescription Coverage: Yes                   Domestic Abuse  Have you ever been the victim of abuse or violence?: No              Anticipated Discharge Information  Discharge Disposition: D/T to home under A care in anticipation of covered skilled care (06)

## 2025-02-10 NOTE — DISCHARGE PLANNING
Received Choice Form at: 7246  Agency/Facility Name:  [facility]  Sent Referral per Choice Form at: 4430

## 2025-02-10 NOTE — ED TRIAGE NOTES
"Chief Complaint   Patient presents with    Wound Re-Check     RFA  Bandages adhered to RFA s/p fall two days ago  Seen at [facility] for initial injury     /84   Pulse 65   Temp 36.7 °C (98 °F) (Temporal)   Resp 15   Ht 1.727 m (5' 8\")   Wt 96.3 kg (212 lb 4.9 oz)   SpO2 95%   BMI 32.28 kg/m²       "

## 2025-02-10 NOTE — ED NOTES
Provided DC instructions, wound care supplies, info regarding outpt wnd care, questions answered.

## 2025-02-18 ENCOUNTER — OFFICE VISIT (OUTPATIENT)
Dept: WOUND CARE | Facility: MEDICAL CENTER | Age: 89
End: 2025-02-18
Attending: NURSE PRACTITIONER
Payer: MEDICARE

## 2025-02-18 DIAGNOSIS — S81.812A ISTAP TYPE 2 SKIN TEAR OF LEFT LOWER LEG: ICD-10-CM

## 2025-02-18 DIAGNOSIS — S51.811A ISTAP TYPE 2 SKIN TEAR OF RIGHT FOREARM: ICD-10-CM

## 2025-02-18 DIAGNOSIS — Z86.73 HISTORY OF CVA (CEREBROVASCULAR ACCIDENT): ICD-10-CM

## 2025-02-18 DIAGNOSIS — S61.511A: ICD-10-CM

## 2025-02-18 PROCEDURE — 11042 DBRDMT SUBQ TIS 1ST 20SQCM/<: CPT | Performed by: NURSE PRACTITIONER

## 2025-02-18 PROCEDURE — 99214 OFFICE O/P EST MOD 30 MIN: CPT

## 2025-02-18 PROCEDURE — 11042 DBRDMT SUBQ TIS 1ST 20SQCM/<: CPT

## 2025-02-18 PROCEDURE — 99214 OFFICE O/P EST MOD 30 MIN: CPT | Mod: 25 | Performed by: NURSE PRACTITIONER

## 2025-02-18 RX ORDER — LOVASTATIN 20 MG/1
20 TABLET ORAL EVERY EVENING
COMMUNITY

## 2025-02-18 NOTE — PROGRESS NOTES
Updated wound care order routed to [facility] via [facility].

## 2025-02-18 NOTE — PROGRESS NOTES
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  He does suffer from balance issues.  Uses front wheel walker.  Once discharged from [facility] patient has had home health through [facility] for physical therapy and speech.  He continues to see speech therapy.  On 2/8/2025, patient was bending over putting dishes into the  and fell which caused multiple skin tears to his left knee, right forearm and wrist.  EMS was called.  He was taken to [facility] ED for evaluation of head injury as patient did strike his head while on Eliquis.  Laceration to his right wrist was reapproximated and sutured.  The other laceration to wrist was closed with Dermabond and Steri-Strips.  His workup was negative and discharged.  Was referred to [facility] wound care clinic for further treatment and care.  On 2/10/2025, patient was seen at [facility] ED for wound assessment and dressing change.  Patient was referred to home health.        Pertinent Medical History: CVA with left leg weakness-on Eliquis    TOBACCO USE:  no   Alcohol drink daily    Patient's problem list, allergies, and current medications reviewed and updated in Epic    Interval History:  2/18/25: Initial wound evaluation, initial provider Clinic visit with Eli BENAVIDES, GINNY-BC, CWON, CFCN.  Pt denies fevers, chills, nausea, vomiting.  Patient accompanied by wife, Pat.  Patient followed up with PCP office yesterday and sutures were removed to the wrist yesterday.  Patient complains of generalized soreness from previous fall.  Updated order sent to [facility].  Patient does have BLE edema.  Previously given DAVID hose and wife purchased OTC  zipper compression socks to control his edema however patient declined to wear.  Discussed use of controlling edema for improved wound healing.  Offered Tubigrip's.  Patient declined.  Medication list updated.    REVIEW OF SYSTEMS:   Review of Systems   Constitutional:  Negative for chills, diaphoresis and fever.   HENT:  Positive for hearing loss.    Eyes: Negative.    Respiratory:  Negative for cough, shortness of breath and wheezing.    Cardiovascular:  Positive for leg swelling. Negative for chest pain, palpitations and claudication.   Gastrointestinal:  Negative for nausea and vomiting.   Musculoskeletal:  Negative for back pain, falls and joint pain.   Skin:  Negative for itching and rash.        Wounds to right arm, wrist left leg   Neurological:  Positive for weakness.   Psychiatric/Behavioral:  Negative for depression. The patient is not nervous/anxious.        PHYSICAL EXAMINATION:   There were no vitals taken for this visit.    Physical Exam  Cardiovascular:      Pulses: Normal pulses.      Comments: +2 right radial pulse  +2 left PT pulse  Pulmonary:      Effort: Pulmonary effort is normal.   Musculoskeletal:         General: Swelling and tenderness present.      Right lower leg: Edema present.      Left lower leg: Edema present.      Comments: BLE pitting edema +3, edema +3 nonpitting to right forearm, strength right hand 4 out of 5 secondary to edema   Skin:     Comments: Left knee skin tear: Full-thickness, skin flap partially reapproximated with ischemia, minimal slough to wound bed, no evidence of infection      Right wrist: Full-thickness, necrosis to skin flap partially lifting, moderate slough, tender, no evidence of infection  Right forearm: Full-thickness, ischemia to skin flap partially lifting, moderate slough, moderate edema, tender, no evidence of infection   Neurological:      General: No focal deficit present.      Mental Status: He is alert.         WOUND ASSESSMENT  Wound 02/18/25  Traumatic Knee Left --Left Knee (Active)   Wound Image    02/18/25 0840   Site Assessment Red;Yellow 02/18/25 0840   Periwound Assessment Ecchymosis;Edema 02/18/25 0840   Margins Attached edges 02/18/25 0840   Drainage Amount Small 02/18/25 0840   Drainage Description Serosanguineous 02/18/25 0840   Treatments Cleansed;Topical Lidocaine;Provider debridement 02/18/25 0840   Wound Cleansing Hypochlorus Acid 02/18/25 0840   Periwound Protectant No-sting Skin Prep 02/18/25 0840   Dressing Changed New 02/18/25 0840   Dressing Cleansing/Solutions Not Applicable 02/18/25 0840   Dressing Options Sorbact;Island Dressing 02/18/25 0840   Dressing Change/Treatment Frequency Monday, Wednesday, Friday, and As Needed 02/18/25 0840   Non-staged Wound Description Full thickness 02/18/25 0840   Wound Length (cm) 2.4 cm 02/18/25 0840   Wound Width (cm) 0.3 cm 02/18/25 0840   Wound Depth (cm) 0.1 cm 02/18/25 0840   Wound Surface Area (cm^2) 0.72 cm^2 02/18/25 0840   Wound Volume (cm^3) 0.072 cm^3 02/18/25 0840   Post-Procedure Length (cm) 2.9 cm 02/18/25 0840   Post-Procedure Width (cm) 0.5 cm 02/18/25 0840   Post-Procedure Depth (cm) 0.2 cm 02/18/25 0840   Post-Procedure Surface Area (cm^2) 1.45 cm^2 02/18/25 0840   Post-Procedure Volume (cm^3) 0.29 cm^3 02/18/25 0840   Tunneling (cm) 0 cm 02/18/25 0840   Undermining (cm) 0 cm 02/18/25 0840   Wound Odor None 02/18/25 0840   Exposed Structures None 02/18/25 0840   Number of days: 1       Wound 02/18/25 Traumatic Wrist Distal Right --Right Wrist (Active)   Wound Image    02/18/25 0840   Site Assessment Red;Yellow 02/18/25 0840   Periwound Assessment Fragile;Edema 02/18/25 0840   Margins Unattached edges 02/18/25 0840   Drainage Amount Moderate 02/18/25 0840   Drainage Description Serosanguineous 02/18/25 0840   Treatments Cleansed;Topical Lidocaine;Provider debridement 02/18/25 0840   Wound Cleansing Hypochlorus Acid 02/18/25 0840   Periwound Protectant Not Applicable 02/18/25 0840    Dressing Changed New 02/18/25 0840   Dressing Cleansing/Solutions Not Applicable 02/18/25 0840   Dressing Options Sorbact;Hydrofiber;Dry Roll Gauze;Hypafix Tape;Tubigrip 02/18/25 0840   Dressing Change/Treatment Frequency Monday, Wednesday, Friday, and As Needed 02/18/25 0840   Non-staged Wound Description Full thickness 02/18/25 0840   Wound Length (cm) 2.3 cm 02/18/25 0840   Wound Width (cm) 4.5 cm 02/18/25 0840   Wound Depth (cm) 0.2 cm 02/18/25 0840   Wound Surface Area (cm^2) 10.35 cm^2 02/18/25 0840   Wound Volume (cm^3) 2.07 cm^3 02/18/25 0840   Post-Procedure Length (cm) 4.5 cm 02/18/25 0840   Post-Procedure Width (cm) 5 cm 02/18/25 0840   Post-Procedure Depth (cm) 0.3 cm 02/18/25 0840   Post-Procedure Surface Area (cm^2) 22.5 cm^2 02/18/25 0840   Post-Procedure Volume (cm^3) 6.75 cm^3 02/18/25 0840   Tunneling (cm) 0 cm 02/18/25 0840   Undermining (cm) 0 cm 02/18/25 0840   Wound Odor None 02/18/25 0840   Exposed Structures Adipose 02/18/25 0840   Number of days: 1       Wound 02/18/25 Traumatic Arm Right --Right Forearm (Active)   Wound Image    02/18/25 0840   Site Assessment Red;Yellow 02/18/25 0840   Periwound Assessment Fragile;Ecchymosis;Edema 02/18/25 0840   Margins Unattached edges 02/18/25 0840   Drainage Amount Moderate 02/18/25 0840   Drainage Description Serosanguineous 02/18/25 0840   Treatments Cleansed;Topical Lidocaine;Provider debridement;Silver nitrate 02/18/25 0840   Wound Cleansing Hypochlorus Acid 02/18/25 0840   Periwound Protectant Not Applicable 02/18/25 0840   Dressing Changed New 02/18/25 0840   Dressing Cleansing/Solutions Not Applicable 02/18/25 0840   Dressing Options Sorbact;Hydrofiber;Dry Roll Gauze;Hypafix Tape;Tubigrip 02/18/25 0840   Dressing Change/Treatment Frequency Monday, Wednesday, Friday, and As Needed 02/18/25 0840   Non-staged Wound Description Full thickness 02/18/25 0840   Wound Length (cm) 3.6 cm 02/18/25 0840   Wound Width (cm) 5.1 cm 02/18/25 0840   Wound  Depth (cm) 0.2 cm 02/18/25 0840   Wound Surface Area (cm^2) 18.36 cm^2 02/18/25 0840   Wound Volume (cm^3) 3.672 cm^3 02/18/25 0840   Post-Procedure Length (cm) 3.9 cm 02/18/25 0840   Post-Procedure Width (cm) 5.1 cm 02/18/25 0840   Post-Procedure Depth (cm) 0.2 cm 02/18/25 0840   Post-Procedure Surface Area (cm^2) 19.89 cm^2 02/18/25 0840   Post-Procedure Volume (cm^3) 3.978 cm^3 02/18/25 0840   Tunneling (cm) 0 cm 02/18/25 0840   Undermining (cm) 0 cm 02/18/25 0840   Wound Odor None 02/18/25 0840   Exposed Structures None 02/18/25 0840   Number of days: 1       PROCEDURE: Debridement of right forearm, right wrist, left knee  -2% viscous lidocaine applied topically to wound bed for approximately 5 minutes prior to debridement  -Curette used to debride wound bed.  Excisional debridement was performed to remove devitalized tissue until healthy, bleeding tissue was visualized.   Approximately 10 cm² debrided from all 3 wounds.  Tissue debrided into the subcutaneous layer.    -Bleeding controlled with manual pressure.    -Wound care completed by wound RN, refer to flowsheet  -Patient tolerated the procedure well, without c/o pain or discomfort.       Pertinent Labs and Diagnostics:    Labs:     A1c:   Lab Results   Component Value Date/Time    HBA1C 5.9 (H) 01/03/2019 11:40 AM          IMAGING: see epic    VASCULAR STUDIES: see epic    LAST  WOUND CULTURE:  DATE :   Lab Results   Component Value Date/Time    CULTRSULT Rare growth usual skin gege. 01/17/2024 09:39 AM    CULTRSULT No fungal growth. 01/17/2024 09:39 AM         ASSESSMENT AND PLAN:     1. ISTAP type 2 skin tear of right forearm  2. ISTAP type 2 skin tear of right wrist  3. ISTAP type 2 skin tear of left lower leg  Ground-level fall causing skin tears on 2/8/2025 2/18/2025: Sutures have been removed from right wrist yesterday at PCPs office.  - Moderate slough to ulcers.  Skin tear flaps with ischemia/necrosis however majority of flap is well adhered to  the ulcers  - Excisional debridement performed today.  Medically necessary to promote wound healing.  -Patient to follow-up weekly at wound care clinic for assessment and debridement  - Patient to follow-up with home health 1-2 times per week for dressing changes.  Established with [facility]  -Patient does have significant edema to RUE and BLE  - Previously given DAVID hose and wife purchased OTC zipper compression socks to control his edema however patient declined to wear.  Discussed use of controlling edema for improved wound healing.  Offered Tubigrips.  Patient declined.       4. History of CVA (cerebrovascular accident)  2/18/2025: CVA on 11/1/2024.  Treated at [facility] discharged to [facility] rehab for 3 weeks.  Since then patient discharged with [facility] for physical therapy and speech therapy.  He continues with speech therapy  - Does have balance issues with residual left leg weakness.  Uses front wheeled walker.  - Discussed having physical therapy reevaluate patient since patient's most recent fall on 2/8/2025.  Patient declined at this time.  - Continue with wound care and speech therapy through home health.            PATIENT EDUCATION  - Importance of adequate nutrition for wound healing  -Advised to go to ER for any increased redness, swelling, drainage, or odor, or if patient develops fever, chills, nausea or vomiting.     My total time spent caring for the patient on the day of the encounter was 30 minutes.   This does not include time spent on separately billable procedures/tests.       Please note that this note may have been created using voice recognition software. I have worked with technical experts from [facility] to optimize the interface.  I have made every reasonable attempt to correct obvious errors, but there may be errors of grammar and possibly content that I did not discover before finalizing the note.    N

## 2025-02-18 NOTE — PATIENT INSTRUCTIONS
-Keep dressings clean and dry. Change dressings if they become over saturated, soiled or fall off. Otherwise, your home health nurse will change your dressings between wound clinic visits.    -On the days you are not changing your dressings, avoid getting the dressings wet. It is not harmful to get your wound wet when you are washing your wound before applying a new dressing.    -If you need to change your dressings at home: Wash your wounds with normal saline, wound cleanser, or unscented soap and water prior to applying your new dressings. Please avoid cleansing with hydrogen peroxide or rubbing alcohol. Hydrogen peroxide and rubbing alcohol are toxic to new tissue and skin cells.    -Avoid prolonged standing or sitting without elevating your legs.    -Remove your compression garments if you have severe pain, severe swelling, numbness, color change, or temperature change in your fingers. If you need to remove your compression garments, do so by unrolling them. Do not cut the compression garments off, this is to prevent cutting yourself on accident.    -Should you experience any significant changes in your wounds, such as signs of infection (increasing redness, swelling, localized heat, increased pain, fever > 101 F, chills) or have any questions regarding your home care instructions, please contact the wound center at [phone]. If after hours, contact your primary care physician or go to the hospital emergency room.     -If you are admitted to any hospital, you will need a new referral to come back to the wound clinic and any scheduled appointments that you already have, may be cancelled.     -If you are 5 or more minutes late for an appointment, we reserve the right to cancel and reschedule that appointment. Additionally, if you are habitually late or not showing (3 late cancellations and/or no shows), we reserve the right to cancel your remaining appointments and it will be your responsibility to obtain a  new referral if services are still needed.

## 2025-02-19 ASSESSMENT — ENCOUNTER SYMPTOMS
EYES NEGATIVE: 1
CHILLS: 0
FEVER: 0
COUGH: 0
BACK PAIN: 0
VOMITING: 0
PALPITATIONS: 0
NERVOUS/ANXIOUS: 0
DEPRESSION: 0
WEAKNESS: 1
NAUSEA: 0
CLAUDICATION: 0
WHEEZING: 0
FALLS: 0
DIAPHORESIS: 0
SHORTNESS OF BREATH: 0

## 2025-02-26 ENCOUNTER — OFFICE VISIT (OUTPATIENT)
Dept: WOUND CARE | Facility: MEDICAL CENTER | Age: 89
End: 2025-02-26
Attending: NURSE PRACTITIONER
Payer: MEDICARE

## 2025-02-26 DIAGNOSIS — R60.0 BILATERAL LOWER EXTREMITY EDEMA: ICD-10-CM

## 2025-02-26 DIAGNOSIS — S81.812A ISTAP TYPE 2 SKIN TEAR OF LEFT LOWER LEG: Primary | ICD-10-CM

## 2025-02-26 DIAGNOSIS — S61.511A: ICD-10-CM

## 2025-02-26 DIAGNOSIS — Z86.73 HISTORY OF CVA (CEREBROVASCULAR ACCIDENT): ICD-10-CM

## 2025-02-26 DIAGNOSIS — S51.811A ISTAP TYPE 2 SKIN TEAR OF RIGHT FOREARM: ICD-10-CM

## 2025-02-26 PROCEDURE — 11042 DBRDMT SUBQ TIS 1ST 20SQCM/<: CPT

## 2025-02-26 PROCEDURE — 99214 OFFICE O/P EST MOD 30 MIN: CPT

## 2025-02-26 NOTE — PROGRESS NOTES
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  He does suffer from balance issues.  Uses front wheel walker.  Once discharged from [facility] patient has had home health through [facility] for physical therapy and speech.  He continues to see speech therapy.  On 2/8/2025, patient was bending over putting dishes into the  and fell which caused multiple skin tears to his left knee, right forearm and wrist.  EMS was called.  He was taken to [facility] ED for evaluation of head injury as patient did strike his head while on Eliquis.  Laceration to his right wrist was reapproximated and sutured.  The other laceration to wrist was closed with Dermabond and Steri-Strips.  His workup was negative and discharged.  Was referred to [facility] wound care clinic for further treatment and care.  On 2/10/2025, patient was seen at [facility] ED for wound assessment and dressing change.  Patient was referred to home health.        Pertinent Medical History: CVA with left leg weakness-on Eliquis    TOBACCO USE:  no   Alcohol drink daily    Patient's problem list, allergies, and current medications reviewed and updated in Epic    Interval History:  2/18/25: Initial wound evaluation, initial provider Clinic visit with Eli BENAVIDES, GINNY-BC, CWON, CFCN.  Pt denies fevers, chills, nausea, vomiting.  Patient accompanied by wife, Pat.  Patient followed up with PCP office yesterday and sutures were removed to the wrist yesterday.  Patient complains of generalized soreness from previous fall.  Updated order sent to [facility].  Patient does have BLE edema.  Previously given DAVID hose and wife purchased OTC  zipper compression socks to control his edema however patient declined to wear.  Discussed use of controlling edema for improved wound healing.  Offered Tubigrip's.  Patient declined.  Medication list updated.    2/26/2025: Clinic visit with Noe Aguirre MD. Patient reports feeling in normal state of health. Accompanied by wife. He has new skin tear of left lower extremity, reports dog jumped off lap and caused new skin tear. Patient noted that he has drainage form left lower extremity wound that saturated the bed. Discussed that his chronic lower extremity edema is causing wound to drain heavily and will prevent wound from healing. He has been resistant to compression but agreeable to try tubigrip this week. Reinforced importance of compression and demonstrated various compression garments. He will think about if he will be willing to wear compression in the future.    REVIEW OF SYSTEMS:   Unchanged from previous wound clinic assessment on 2/18/2025, except as noted in interval history above      PHYSICAL EXAMINATION:   There were no vitals taken for this visit.    Physical Exam  Cardiovascular:      Pulses: Normal pulses.      Comments: +2 right radial pulse  +2 left PT pulse  Pulmonary:      Effort: Pulmonary effort is normal.   Musculoskeletal:         General: Swelling and tenderness present.      Right lower leg: Edema present.      Left lower leg: Edema present.      Comments: BLE pitting edema +3, edema +3 nonpitting to right forearm, strength right hand 4 out of 5 secondary to edema   Skin:     Comments: Left knee skin tear: Full-thickness, wound smaller. No evidence of infection.    Right wrist: Full-thickness, Wound smaller, improving, thin layer of slough. Slightly macerated.  Right forearm: Full-thickness, Wound smaller, improving, thin layer of slough. Slightly macerated.    Left anterior lower extremity skin tear: New skin tear secondary to dog. Flap of skin appears dusky but adhering to wound bed.  No evidence of infection.   Neurological:      General: No focal deficit present.      Mental Status: He is alert.         WOUND ASSESSMENT  Wound 02/18/25 Traumatic Knee Left --Left Knee (Active)   Wound Image    02/26/25 1147   Site Assessment Red;Yellow 02/26/25 1147   Periwound Assessment Dry;Fragile;Scar tissue 02/26/25 1147   Margins Attached edges 02/26/25 1147   Drainage Amount Small 02/26/25 1147   Drainage Description Serosanguineous 02/26/25 1147   Treatments Cleansed;Topical Lidocaine;Provider debridement;Site care 02/26/25 1147   Wound Cleansing Hypochlorus Acid 02/26/25 1147   Periwound Protectant No-sting Skin Prep;Moisture Barrier;Skin Moisturizer 02/26/25 1147   Dressing Changed New 02/18/25 0840   Dressing Cleansing/Solutions Not Applicable 02/26/25 1147   Dressing Options Hydrofiber Silver;Silicone Adhesive Foam 02/26/25 1147   Dressing Change/Treatment Frequency Twice Weekly 02/26/25 1147   Non-staged Wound Description Full thickness 02/26/25 1147   Wound Length (cm) 1.5 cm 02/26/25 1147   Wound Width (cm) 0.4 cm 02/26/25 1147   Wound Depth (cm) 0.1 cm 02/26/25 1147   Wound Surface Area (cm^2) 0.6 cm^2 02/26/25 1147   Wound Volume (cm^3) 0.06 cm^3 02/26/25 1147   Post-Procedure Length (cm) 1.6 cm 02/26/25 1147   Post-Procedure Width (cm) 0.4 cm 02/26/25 1147   Post-Procedure Depth (cm) 0.1 cm 02/26/25 1147   Post-Procedure Surface Area (cm^2) 0.64 cm^2 02/26/25 1147   Post-Procedure Volume (cm^3) 0.064 cm^3 02/26/25 1147   Wound Healing % 17 02/26/25 1147   Tunneling (cm) 0 cm 02/26/25 1147   Undermining (cm) 0 cm 02/26/25 1147   Wound Odor None 02/26/25 1147   Exposed Structures None 02/26/25 1147   Number of days: 8       Wound 02/18/25 Traumatic Wrist Distal Right --Right Wrist (Active)   Wound Image    02/26/25 1147   Site Assessment Red;Yellow 02/26/25 1147   Periwound Assessment Maceration;Edema;Fragile 02/26/25 1147   Margins Attached edges 02/26/25 1147   Drainage Amount Moderate 02/26/25  1147   Drainage Description Serosanguineous 02/26/25 1147   Treatments Cleansed;Topical Lidocaine;Provider debridement;Site care 02/26/25 1147   Wound Cleansing Hypochlorus Acid 02/26/25 1147   Periwound Protectant Skin Moisturizer;Moisture Barrier 02/26/25 1147   Dressing Changed New 02/18/25 0840   Dressing Cleansing/Solutions Not Applicable 02/26/25 1147   Dressing Options Hydrofiber Silver;Silicone Adhesive Foam;Tubigrip 02/26/25 1147   Dressing Change/Treatment Frequency Twice Weekly 02/26/25 1147   Non-staged Wound Description Full thickness 02/26/25 1147   Wound Length (cm) 1.4 cm 02/26/25 1147   Wound Width (cm) 2.5 cm 02/26/25 1147   Wound Depth (cm) 0.1 cm 02/26/25 1147   Wound Surface Area (cm^2) 3.5 cm^2 02/26/25 1147   Wound Volume (cm^3) 0.35 cm^3 02/26/25 1147   Post-Procedure Length (cm) 1.5 cm 02/26/25 1147   Post-Procedure Width (cm) 2.6 cm 02/26/25 1147   Post-Procedure Depth (cm) 0.1 cm 02/26/25 1147   Post-Procedure Surface Area (cm^2) 3.9 cm^2 02/26/25 1147   Post-Procedure Volume (cm^3) 0.39 cm^3 02/26/25 1147   Wound Healing % 83 02/26/25 1147   Tunneling (cm) 0 cm 02/26/25 1147   Undermining (cm) 0 cm 02/26/25 1147   Wound Odor None 02/26/25 1147   Exposed Structures None 02/26/25 1147   Number of days: 8       Wound 02/18/25 Traumatic Arm Right --Right Forearm (Active)   Wound Image    02/26/25 1147   Site Assessment Red;Yellow 02/26/25 1147   Periwound Assessment Fragile;Maceration 02/26/25 1147   Margins Attached edges 02/26/25 1147   Drainage Amount Moderate 02/26/25 1147   Drainage Description Serosanguineous 02/26/25 1147   Treatments Cleansed;Topical Lidocaine;Provider debridement;Site care 02/26/25 1147   Wound Cleansing Hypochlorus Acid 02/26/25 1147   Periwound Protectant No-sting Skin Prep;Skin Moisturizer;Moisture Barrier 02/26/25 1147   Dressing Changed New 02/18/25 0840   Dressing Cleansing/Solutions Not Applicable 02/26/25 1147   Dressing Options Hydrofiber Silver;Silicone  Adhesive Foam;Tubigrip 02/26/25 1147   Dressing Change/Treatment Frequency Twice Weekly 02/26/25 1147   Non-staged Wound Description Full thickness 02/26/25 1147   Wound Length (cm) 0.7 cm 02/26/25 1147   Wound Width (cm) 2 cm 02/26/25 1147   Wound Depth (cm) 0.1 cm 02/26/25 1147   Wound Surface Area (cm^2) 1.4 cm^2 02/26/25 1147   Wound Volume (cm^3) 0.14 cm^3 02/26/25 1147   Post-Procedure Length (cm) 0.8 cm 02/26/25 1147   Post-Procedure Width (cm) 2.1 cm 02/26/25 1147   Post-Procedure Depth (cm) 0.1 cm 02/26/25 1147   Post-Procedure Surface Area (cm^2) 1.68 cm^2 02/26/25 1147   Post-Procedure Volume (cm^3) 0.168 cm^3 02/26/25 1147   Wound Healing % 96 02/26/25 1147   Tunneling (cm) 0 cm 02/26/25 1147   Undermining (cm) 0 cm 02/26/25 1147   Wound Odor None 02/26/25 1147   Exposed Structures None 02/26/25 1147   Number of days: 8       Wound 02/26/25 Traumatic Leg Lower Left -- Left anterolateral LE (Active)   Wound Image    02/26/25 1147   Site Assessment Purple;Red;Yellow 02/26/25 1147   Periwound Assessment Edema;Ecchymosis 02/26/25 1147   Margins Attached edges 02/26/25 1147   Drainage Amount Large 02/26/25 1147   Drainage Description Serous 02/26/25 1147   Treatments Cleansed;Topical Lidocaine;Provider debridement;Site care 02/26/25 1147   Wound Cleansing Hypochlorus Acid 02/26/25 1147   Periwound Protectant Skin Moisturizer;Moisture Barrier 02/26/25 1147   Dressing Cleansing/Solutions Not Applicable 02/26/25 1147   Dressing Options Sorbact;Hydrofiber Silver;Silicone Adhesive Foam;Tubigrip 02/26/25 1147   Dressing Change/Treatment Frequency Twice Weekly 02/26/25 1147   Non-staged Wound Description Full thickness 02/26/25 1147   Wound Length (cm) 2 cm 02/26/25 1147   Wound Width (cm) 4 cm 02/26/25 1147   Wound Depth (cm) 0.1 cm 02/26/25 1147   Wound Surface Area (cm^2) 8 cm^2 02/26/25 1147   Wound Volume (cm^3) 0.8 cm^3 02/26/25 1147   Post-Procedure Length (cm) 2 cm 02/26/25 1147   Post-Procedure Width (cm) 4  cm 02/26/25 1147   Post-Procedure Depth (cm) 0.2 cm 02/26/25 1147   Post-Procedure Surface Area (cm^2) 8 cm^2 02/26/25 1147   Post-Procedure Volume (cm^3) 1.6 cm^3 02/26/25 1147   Tunneling (cm) 0 cm 02/26/25 1147   Undermining (cm) 0 cm 02/26/25 1147   Wound Odor None 02/26/25 1147   Exposed Structures None 02/26/25 1147   Number of days: 0       PROCEDURE: Debridement of right forearm, right wrist, left knee, left anterior lower extremity wound  -2% viscous lidocaine applied topically to wound bed for approximately 5 minutes prior to debridement  -Curette used to debride wound bed.  Excisional debridement was performed to remove devitalized tissue until healthy, bleeding tissue was visualized.   Approximately 14.22 cm² debrided from all wounds.  Tissue debrided into the subcutaneous layer.    -Bleeding controlled with manual pressure.    -Wound care completed by wound RN, refer to flowsheet  -Patient tolerated the procedure well, without c/o pain or discomfort.       Pertinent Labs and Diagnostics:    Labs:     A1c:   Lab Results   Component Value Date/Time    HBA1C 5.9 (H) 01/03/2019 11:40 AM          IMAGING: see epic    VASCULAR STUDIES: see epic    LAST  WOUND CULTURE:  DATE :   Lab Results   Component Value Date/Time    CULTRSULT Rare growth usual skin gege. 01/17/2024 09:39 AM    CULTRSULT No fungal growth. 01/17/2024 09:39 AM         ASSESSMENT AND PLAN:     1. ISTAP type 2 skin tear of right forearm  2. ISTAP type 2 skin tear of right wrist  3. ISTAP type 2 skin tear of left lower leg  Ground-level fall causing skin tears on 2/8/2025 2/26/2025:  - Patient with new ISTAP type 2 skin tear left anterior lower leg. Rest of wounds are improving.  - Excisional debridement performed today.  Medically necessary to promote wound healing.  -Patient to follow-up weekly at wound care clinic for assessment and debridement  - Patient to follow-up with home health 1-2 times per week for dressing changes.  Established  with [facility]  -Patient does have significant edema to RUE and BLE  - Previously given DAVID hose and wife purchased OTC zipper compression socks to control his edema however patient declined to wear.  Discussed use of controlling edema for improved wound healing. He is agreeable to tubigrips today.    4. History of CVA (cerebrovascular accident)    2/26/2025: CVA on 11/1/2024.  Treated at [facility] discharged to [facility] rehab for 3 weeks.  Since then patient discharged with [facility] for physical therapy and speech therapy.  He continues with speech therapy  - Does have balance issues with residual left leg weakness.  Uses front wheeled walker.  - Discussed having physical therapy reevaluate patient since patient's most recent fall on 2/8/2025.  Patient declined at this time.  - Continue with wound care and speech therapy through [facility].    5. Bilateral lower extremity edema    2/26/2025  - Patient with lower extremity edema, likely multifactorial including cardiac, dependent edema, and venous disease as evidenced by hemosiderin staining  - Does not wear compression  - Counseled on importance of compression therapy to manage edema for wound healing and prevention of future wounds.  - He has compression socks, doesn't wear due to difficulty of applying  - Demonstrated use of compression garments, he will consider.    PATIENT EDUCATION  - Importance of adequate nutrition for wound healing  -Advised to go to ER for any increased redness, swelling, drainage, or odor, or if patient develops fever, chills, nausea or vomiting.     My total time spent caring for the patient on the day of the encounter was 30 minutes, reviewing history, assessment, counseling and education, and coordination of care including counseling, formulating plan for new wound, and demonstrating use of compression garments.  This does not include time spent on separately billable  procedures/tests.    Please note that this note may have been created using voice recognition software. I have worked with technical experts from [facility] to optimize the interface.  I have made every reasonable attempt to correct obvious errors, but there may be errors of grammar and possibly content that I did not discover before finalizing the note.    N

## 2025-02-26 NOTE — PATIENT INSTRUCTIONS
-Keep dressings clean and dry. Change dressings every 3-4 days, and if they become over saturated, soiled or fall off.     -On the days you are not changing your dressings, avoid getting the dressings wet. It is not harmful to get your wound wet when you are washing your wound before applying a new dressing.    -If you need to change your dressings at home: Wash your wound with normal saline, wound cleanser, or unscented soap and water prior to applying your new dressings. Please avoid cleansing with hydrogen peroxide or rubbing alcohol. Hydrogen peroxide and rubbing alcohol are toxic to new tissue and skin cells.    -Avoid prolonged standing or sitting without elevating your legs.    -Remove your compression garments if you have severe pain, severe swelling, numbness, color change, or temperature change in your toes. If you need to remove your compression garments, do so by unrolling them. Do not cut the compression garments off, this is to prevent cutting yourself on accident.    -Should you experience any significant changes in your wound(s), such as signs of infection (increasing redness, swelling, localized heat, increased pain, fever > 101 F, chills) or have any questions regarding your home care instructions, please contact the wound center at [phone]. If after hours, contact your primary care physician or go to the hospital emergency room.     -If you are admitted to any hospital, you will need a new referral to come back to the wound clinic and any scheduled appointments that you already have, may be cancelled.     -If you are 5 or more minutes late for an appointment, we reserve the right to cancel and reschedule that appointment. Additionally, if you are habitually late or not showing (3 late cancellations and/or no shows), we reserve the right to cancel your remaining appointments and it will be your responsibility to obtain a new referral if services are still needed.

## 2025-03-07 ENCOUNTER — APPOINTMENT (OUTPATIENT)
Dept: WOUND CARE | Facility: MEDICAL CENTER | Age: 89
End: 2025-03-07
Attending: NURSE PRACTITIONER
Payer: MEDICARE

## 2025-03-10 ENCOUNTER — OFFICE VISIT (OUTPATIENT)
Dept: WOUND CARE | Facility: MEDICAL CENTER | Age: 89
End: 2025-03-10
Attending: NURSE PRACTITIONER
Payer: MEDICARE

## 2025-03-10 DIAGNOSIS — R60.0 BILATERAL LOWER EXTREMITY EDEMA: ICD-10-CM

## 2025-03-10 DIAGNOSIS — S31.109A WOUND OF RIGHT GROIN, INITIAL ENCOUNTER: ICD-10-CM

## 2025-03-10 DIAGNOSIS — S61.511A: ICD-10-CM

## 2025-03-10 DIAGNOSIS — Z95.0 S/P PLACEMENT OF CARDIAC PACEMAKER: ICD-10-CM

## 2025-03-10 DIAGNOSIS — S51.811A ISTAP TYPE 2 SKIN TEAR OF RIGHT FOREARM: ICD-10-CM

## 2025-03-10 DIAGNOSIS — Z86.73 HISTORY OF CVA (CEREBROVASCULAR ACCIDENT): ICD-10-CM

## 2025-03-10 DIAGNOSIS — S81.812A ISTAP TYPE 2 SKIN TEAR OF LEFT LOWER LEG: ICD-10-CM

## 2025-03-10 PROCEDURE — 17250 CHEM CAUT OF GRANLTJ TISSUE: CPT

## 2025-03-10 PROCEDURE — 17250 CHEM CAUT OF GRANLTJ TISSUE: CPT | Mod: 59 | Performed by: NURSE PRACTITIONER

## 2025-03-10 PROCEDURE — 11042 DBRDMT SUBQ TIS 1ST 20SQCM/<: CPT

## 2025-03-10 PROCEDURE — 11042 DBRDMT SUBQ TIS 1ST 20SQCM/<: CPT | Performed by: NURSE PRACTITIONER

## 2025-03-10 PROCEDURE — 99214 OFFICE O/P EST MOD 30 MIN: CPT

## 2025-03-10 PROCEDURE — 99214 OFFICE O/P EST MOD 30 MIN: CPT | Mod: 25 | Performed by: NURSE PRACTITIONER

## 2025-03-10 NOTE — PROGRESS NOTES
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  He does suffer from balance issues.  Uses front wheel walker.  Once discharged from [facility] patient has had home health through [facility] for physical therapy and speech.  He continues to see speech therapy.  On 2/8/2025, patient was bending over putting dishes into the  and fell which caused multiple skin tears to his left knee, right forearm and wrist.  EMS was called.  He was taken to [facility] ED for evaluation of head injury as patient did strike his head while on Eliquis.  Laceration to his right wrist was reapproximated and sutured.  The other laceration to wrist was closed with Dermabond and Steri-Strips.  His workup was negative and discharged.  Was referred to [facility] wound care clinic for further treatment and care.  On 2/10/2025, patient was seen at [facility] ED for wound assessment and dressing change.  Patient was referred to home health.        Pertinent Medical History: CVA with left leg weakness-on Eliquis    TOBACCO USE:  no   Alcohol drink daily    Patient's problem list, allergies, and current medications reviewed and updated in Epic    Interval History:  2/18/25: Initial wound evaluation, initial provider Clinic visit with Eli BENAVIDES, GINNY-BC, CWON, CFCN.  Pt denies fevers, chills, nausea, vomiting.  Patient accompanied by wife, Pat.  Patient followed up with PCP office yesterday and sutures were removed to the wrist yesterday.  Patient complains of generalized soreness from previous fall.  Updated order sent to home health.  Patient  does have BLE edema.  Previously given DAVID hose and wife purchased OTC zipper compression socks to control his edema however patient declined to wear.  Discussed use of controlling edema for improved wound healing.  Offered Tubigrip's.  Patient declined.  Medication list updated.    2/26/2025: Clinic visit with Noe Aguirre MD. Patient reports feeling in normal state of health. Accompanied by wife. He has new skin tear of left lower extremity, reports dog jumped off lap and caused new skin tear. Patient noted that he has drainage form left lower extremity wound that saturated the bed. Discussed that his chronic lower extremity edema is causing wound to drain heavily and will prevent wound from healing. He has been resistant to compression but agreeable to try tubigrip this week. Reinforced importance of compression and demonstrated various compression garments. He will think about if he will be willing to wear compression in the future.    3/10/25: Clinic visit with Eli BENAVIDES, FNP-BC, CWON, CFCN.  Pt denies fevers, chills, nausea, vomiting.  Accompanied by wife and daughter.  Intermittently using Tubigrip.  - Had pacemaker placed by Dr. Santos at [facility] last week.  Followed up with cardiology MAKAYLA today and patient was noted to have ruptured bullae and ecchymosis around insertion site.  Right wrist, left knee and right forearm skin tear improved.  Left anterior lower leg ulcer with thick slough, although area improved.      REVIEW OF SYSTEMS:   Unchanged from previous wound clinic assessment on 2/26/2025, except as noted in interval history above      PHYSICAL EXAMINATION:   There were no vitals taken for this visit.    Physical Exam  Cardiovascular:      Pulses: Normal pulses.      Comments: +2 right radial pulse  +2 left PT pulse  Pulmonary:      Effort: Pulmonary effort is normal.   Musculoskeletal:         General: Swelling and tenderness present.      Right lower leg:  Edema present.      Left lower leg: Edema present.      Comments: BLE pitting edema +4, edema +3 nonpitting to right forearm, strength right hand 4 out of 5 secondary to edema   Skin:     Comments: Left knee skin tear: Full-thickness, crust, no drainage. No evidence of infection.    Right wrist: Full-thickness, scattered thin crust   right forearm: Full-thickness, area decreased, hypergranular tissue    Left anterior lower extremity skin tear:  skin tear secondary to dog.  Thick slough, area decreased, tenderness with palpation  No evidence of infection.    Right groin: Insertion site cardiac pacemaker.  Green suture noted with slight dehiscence surrounding suture with adherent slough.  To MEAGHAN skin ruptured bullae, intact serous bullae inferior aspect, deflated sanguinous filled bullae medial aspect, ecchymosis to periwound, edematous, area soft, no induration   Neurological:      General: No focal deficit present.      Mental Status: He is alert.         WOUND ASSESSMENT  Wound 02/18/25 Traumatic Knee Left --Left Knee (Active)   Wound Image   03/10/25 1700   Site Assessment Brown;Crusted 03/10/25 1700   Periwound Assessment Dry;Fragile;Flaky 03/10/25 1700   Margins Attached edges 03/10/25 1700   Drainage Amount None 03/10/25 1700   Drainage Description Serosanguineous 02/26/25 1147   Treatments Cleansed;Site care 03/10/25 1700   Wound Cleansing Hypochlorus Acid 03/10/25 1700   Periwound Protectant No-sting Skin Prep;Skin Moisturizer 03/10/25 1700   Dressing Changed New 02/18/25 0840   Dressing Cleansing/Solutions Not Applicable 03/10/25 1700   Dressing Options Open to Air 03/10/25 1700   Dressing Change/Treatment Frequency Twice Weekly 02/26/25 1147   Non-staged Wound Description Full thickness 03/10/25 1700   Wound Length (cm) 1.5 cm 02/26/25 1147   Wound Width (cm) 0.4 cm 02/26/25 1147   Wound Depth (cm) 0.1 cm 02/26/25 1147   Wound Surface Area (cm^2) 0.6 cm^2 02/26/25 1147   Wound Volume (cm^3) 0.06 cm^3  02/26/25 1147   Post-Procedure Length (cm) 1.6 cm 02/26/25 1147   Post-Procedure Width (cm) 0.4 cm 02/26/25 1147   Post-Procedure Depth (cm) 0.1 cm 02/26/25 1147   Post-Procedure Surface Area (cm^2) 0.64 cm^2 02/26/25 1147   Post-Procedure Volume (cm^3) 0.064 cm^3 02/26/25 1147   Wound Healing % 17 02/26/25 1147   Tunneling (cm) 0 cm 02/26/25 1147   Undermining (cm) 0 cm 02/26/25 1147   Wound Odor None 02/26/25 1147   Exposed Structures None 02/26/25 1147   Number of days: 20       Wound 02/18/25 Traumatic Wrist Distal Right --Right Wrist (Active)   Wound Image   03/10/25 1700   Site Assessment Red;Brown;Crusted 03/10/25 1700   Periwound Assessment Dry;Fragile;Flaky 03/10/25 1700   Margins Attached edges 03/10/25 1700   Drainage Amount None 03/10/25 1700   Drainage Description Serosanguineous 02/26/25 1147   Treatments Cleansed;Topical Lidocaine;Provider debridement;Silver nitrate 03/10/25 1700   Wound Cleansing Hypochlorus Acid 03/10/25 1700   Periwound Protectant No-sting Skin Prep;Skin Moisturizer 03/10/25 1700   Dressing Changed New 02/18/25 0840   Dressing Cleansing/Solutions Not Applicable 03/10/25 1700   Dressing Options Hydrofiber Silver;Silicone Adhesive Foam 03/10/25 1700   Dressing Change/Treatment Frequency Twice Weekly 03/10/25 1700   Non-staged Wound Description Full thickness 03/10/25 1700   Wound Length (cm) 1.4 cm 02/26/25 1147   Wound Width (cm) 2.5 cm 02/26/25 1147   Wound Depth (cm) 0.1 cm 02/26/25 1147   Wound Surface Area (cm^2) 3.5 cm^2 02/26/25 1147   Wound Volume (cm^3) 0.35 cm^3 02/26/25 1147   Post-Procedure Length (cm) 3.5 cm 03/10/25 1700   Post-Procedure Width (cm) 0.5 cm 03/10/25 1700   Post-Procedure Depth (cm) 0.1 cm 03/10/25 1700   Post-Procedure Surface Area (cm^2) 1.75 cm^2 03/10/25 1700   Post-Procedure Volume (cm^3) 0.175 cm^3 03/10/25 1700   Wound Healing % 83 02/26/25 1147   Tunneling (cm) 0 cm 03/10/25 1700   Undermining (cm) 0 cm 03/10/25 1700   Wound Odor None 03/10/25 1700    Exposed Structures None 03/10/25 1700   Number of days: 20       Wound 02/18/25 Traumatic Arm Right --Right Forearm (Active)   Wound Image    03/10/25 1700   Site Assessment Red;Hypergranulation 03/10/25 1700   Periwound Assessment Fragile;Flaky;Dry 03/10/25 1700   Margins Attached edges 03/10/25 1700   Drainage Amount Small 03/10/25 1700   Drainage Description Serosanguineous 03/10/25 1700   Treatments Cleansed;Topical Lidocaine;Silver nitrate;Site care 03/10/25 1700   Wound Cleansing Hypochlorus Acid 03/10/25 1700   Periwound Protectant No-sting Skin Prep;Skin Moisturizer 03/10/25 1700   Dressing Changed New 02/18/25 0840   Dressing Cleansing/Solutions Not Applicable 03/10/25 1700   Dressing Options Hydrofiber Silver;Silicone Adhesive Foam 03/10/25 1700   Dressing Change/Treatment Frequency Twice Weekly 03/10/25 1700   Non-staged Wound Description Full thickness 03/10/25 1700   Wound Length (cm) 0.6 cm 03/10/25 1700   Wound Width (cm) 1.2 cm 03/10/25 1700   Wound Depth (cm) 0.1 cm 03/10/25 1700   Wound Surface Area (cm^2) 0.72 cm^2 03/10/25 1700   Wound Volume (cm^3) 0.072 cm^3 03/10/25 1700   Post-Procedure Length (cm) 0.7 cm 03/10/25 1700   Post-Procedure Width (cm) 1.2 cm 03/10/25 1700   Post-Procedure Depth (cm) 0.1 cm 03/10/25 1700   Post-Procedure Surface Area (cm^2) 0.84 cm^2 03/10/25 1700   Post-Procedure Volume (cm^3) 0.084 cm^3 03/10/25 1700   Wound Healing % 98 03/10/25 1700   Tunneling (cm) 0 cm 03/10/25 1700   Undermining (cm) 0 cm 03/10/25 1700   Wound Odor None 03/10/25 1700   Exposed Structures None 03/10/25 1700   Number of days: 20       Wound 02/26/25 Traumatic Leg Lower Left -- Left anterolateral LE (Active)   Wound Image    03/10/25 1700   Site Assessment Yellow;Slough;Red 03/10/25 1700   Periwound Assessment Edema;Dry;Fragile;Flaky 03/10/25 1700   Margins Unattached edges 03/10/25 1700   Drainage Amount Moderate 03/10/25 1700   Drainage Description Serosanguineous 03/10/25 1700   Treatments  Cleansed;Topical Lidocaine;Provider debridement;Site care 03/10/25 1700   Wound Cleansing Hypochlorus Acid 03/10/25 1700   Periwound Protectant No-sting Skin Prep;Skin Moisturizer 03/10/25 1700   Dressing Cleansing/Solutions Not Applicable 03/10/25 1700   Dressing Options Hydrofiber Silver;Silicone Adhesive Foam 03/10/25 1700   Dressing Change/Treatment Frequency Twice Weekly 03/10/25 1700   Non-staged Wound Description Full thickness 03/10/25 1700   Wound Length (cm) 1.6 cm 03/10/25 1700   Wound Width (cm) 3.8 cm 03/10/25 1700   Wound Depth (cm) 0.1 cm 03/10/25 1700   Wound Surface Area (cm^2) 6.08 cm^2 03/10/25 1700   Wound Volume (cm^3) 0.608 cm^3 03/10/25 1700   Post-Procedure Length (cm) 1.6 cm 03/10/25 1700   Post-Procedure Width (cm) 3.8 cm 03/10/25 1700   Post-Procedure Depth (cm) 0.2 cm 03/10/25 1700   Post-Procedure Surface Area (cm^2) 6.08 cm^2 03/10/25 1700   Post-Procedure Volume (cm^3) 1.216 cm^3 03/10/25 1700   Wound Healing % 24 03/10/25 1700   Tunneling (cm) 0 cm 03/10/25 1700   Undermining (cm) 0 cm 03/10/25 1700   Wound Odor None 03/10/25 1700   Exposed Structures None 03/10/25 1700   Number of days: 12       Wound 03/10/25 Full Thickness Wound Groin Anterior Right Surgical incision with surrounding MARSI (Active)   Wound Image    03/10/25 1700   Site Assessment Red;Fragile;Edema;Painful 03/10/25 1700   Periwound Assessment Edema;Fragile;Ecchymosis 03/10/25 1700   Margins Attached edges 03/10/25 1700   Closure Sutures 03/10/25 1700   Drainage Amount Moderate 03/10/25 1700   Drainage Description Serosanguineous 03/10/25 1700   Treatments Cleansed;Topical Lidocaine;Provider debridement;Site care 03/10/25 1700   Wound Cleansing Hypochlorus Acid 03/10/25 1700   Periwound Protectant No-sting Skin Prep 03/10/25 1700   Dressing Status Old drainage 03/10/25 1700   Dressing Changed New 03/10/25 1700   Dressing Cleansing/Solutions Not Applicable 03/10/25 1700   Dressing Options Adaptic;Hydrofiber  Silver;Silicone Adhesive Foam 03/10/25 1700   Dressing Change/Treatment Frequency Every 48 hrs, and As Needed 03/10/25 1700   Wound Team Following Weekly 03/10/25 1700   Non-staged Wound Description Full thickness 03/10/25 1700   Wound Length (cm) 6 cm 03/10/25 1700   Wound Width (cm) 18 cm 03/10/25 1700   Wound Depth (cm) 0.1 cm 03/10/25 1700   Wound Surface Area (cm^2) 108 cm^2 03/10/25 1700   Wound Volume (cm^3) 10.8 cm^3 03/10/25 1700   Post-Procedure Length (cm) 6 cm 03/10/25 1700   Post-Procedure Width (cm) 18 cm 03/10/25 1700   Post-Procedure Depth (cm) 0.1 cm 03/10/25 1700   Post-Procedure Surface Area (cm^2) 108 cm^2 03/10/25 1700   Post-Procedure Volume (cm^3) 10.8 cm^3 03/10/25 1700   Wound Odor None 03/10/25 1700   Exposed Structures None 03/10/25 1700   Number of days: 0       PROCEDURE: Debridement of  left anterior lower extremity wound  -2% viscous lidocaine applied topically to wound bed for approximately 5 minutes prior to debridement  -Curette used to debride wound bed.  Excisional debridement was performed to remove devitalized tissue until healthy, bleeding tissue was visualized.   6 cm² debrided from left anterior lower leg.  Tissue debrided into the subcutaneous layer.    -Bleeding controlled with manual pressure.    -Wound care completed by wound RN, refer to flowsheet  -Patient tolerated majority the procedure well, without c/o pain or discomfort.     Procedure: Cauterization right forearm  Using silver nitrate, cauterized hypergranular tissue to right forearm.  Neutralized with NS.  Patient tolerated well.  Wound care completed by RN.  See flowsheet        Procedure: Right wrist, right groin conservative debridement  Using scissors, forceps, debrided nonfunctional epidermis from right wrist, right groin.  After removal tissue to right wrist, revealed intact skin.  After removal of tissue consisting of desiccated sanguinous bullae, tissue below red moist.  Did not necessitate excisional  debridement.  Total area debrided less than 20 cm².  Wound care completed by RN.  See flowsheet.  - Patient tolerated procedure well.    Pertinent Labs and Diagnostics:    Labs:     A1c:   Lab Results   Component Value Date/Time    HBA1C 5.9 (H) 01/03/2019 11:40 AM          IMAGING: see epic    VASCULAR STUDIES: see epic    LAST  WOUND CULTURE:  DATE :   Lab Results   Component Value Date/Time    CULTRSULT Rare growth usual skin gege. 01/17/2024 09:39 AM    CULTRSULT No fungal growth. 01/17/2024 09:39 AM         ASSESSMENT AND PLAN:     1. ISTAP type 2 skin tear of right forearm  2. ISTAP type 2 skin tear of right wrist  3. ISTAP type 2 skin tear of left lower leg  Ground-level fall causing skin tears on 2/8/2025    3/10/2025:Right wrist, left knee and right forearm skin tear improved.  Left anterior lower leg ulcer with thick slough, although area improved.  -- Excisional debridement performed today.  Medically necessary to promote wound healing.  -Patient to follow-up weekly at wound care clinic for assessment and debridement  - Patient to follow-up with [facility] 1-2 times per week for dressing changes.  Established with [facility].    -Patient does have significant edema to RUE and BLE  - Previously given DAVID hose and wife purchased OTC zipper compression socks to control his edema however patient declined to wear.  Family agreeable to wearing Tubigrip's last visit.  Per wife and daughter, patient wearing intermittently.  Reminded patient importance of controlling edema to control drainage and thus reducing wound size.  Emphasize importance of wearing compression to BLE.     Wound care: See flowsheet    4. History of CVA (cerebrovascular accident)    3/10/2025: CVA on 11/1/2024.  Treated at [facility] discharged to [facility] rehab for 3 weeks.  Since then patient discharged with [facility] for physical therapy and speech therapy.  He continues with speech therapy  -  Does have balance issues with residual left leg weakness.  Uses front wheeled walker.  - Discussed having physical therapy reevaluate patient since patient's most recent fall on 2/8/2025.  Patient declined at this time.  - Continue with wound care and speech therapy through home health.    5. Bilateral lower extremity edema    3/10/2025  - Patient with lower extremity edema, likely multifactorial including cardiac, dependent edema, and venous disease as evidenced by hemosiderin staining  - Does not wear compression  - Counseled on importance of compression therapy to manage edema for wound healing and prevention of future wounds.  - He has compression socks, doesn't wear due to difficulty of applying  - Demonstrated use of compression garments previously, he will consider.  -Wearing Tubigrip's intermittently.  Emphasize importance to expedite wound healing.  See above.    6.  Wound of right groin, initial encounter  7.  S/P placement of cardiac pacemaker    3/10/2025: Cardiac pacemaker placed by Dr. Santos at [facility] 3/3/2025.  Had follow-up with cardiology APRN today and it was noted that patient had MARSI with ruptured and intact bullae surrounded by ecchymosis.  No hematoma noted.  - Has follow-up with cardiology next week for assessment.  Slight dehiscence where suture remains in place.  Leg edematous.      PATIENT EDUCATION  - Importance of adequate nutrition for wound healing  -Advised to go to ER for any increased redness, swelling, drainage, or odor, or if patient develops fever, chills, nausea or vomiting.     My total time spent caring for the patient on the day of the encounter was 30 minutes, reviewing history, assessment, counseling and education, and coordination of care including counseling, formulating plan for new wound, and demonstrating use of compression garments.  This does not include time spent on separately billable procedures/tests.    Please note that this note may  have been created using voice recognition software. I have worked with technical experts from [facility] to optimize the interface.  I have made every reasonable attempt to correct obvious errors, but there may be errors of grammar and possibly content that I did not discover before finalizing the note.    N

## 2025-03-11 NOTE — PATIENT INSTRUCTIONS
-Keep your wound dressing clean, dry, and intact.     -Change your dressing if it becomes soiled, soaked, or falls off and every 2-3 days.    -Remove your compression stocking or wrap if you have severe pain, severe swelling, numbness, color change, or temperature change in your toes. If you need to remove your compression wrap or stocking, do so by unrolling it. Do not cut the compression stocking or wrap off to prevent cutting yourself on accident.    -Should you experience any significant changes in your wound(s), such as infection (redness, swelling, localized heat, increased pain, fever > 101 F, chills) or have any questions regarding your home care instructions, please contact the wound center at [phone]. If after hours, contact your primary care physician or go to the hospital emergency room.     If you are admitted to any hospital, you will need a new referral to come back to the wound clinic from your primary care or other provider. Any scheduled appointments that you already have, may be cancelled and will need to be updated.    - Resolved wounds will be fragile for a few days to weeks, bathe and dry area gently. Healed areas only ever regain a maximum of 80% of the tensile strength of the surrounding skin, remodeling of scar can continue for 6mo - a year. Apply moisturizer daily and as needed to improve skin elasticity.

## 2025-03-11 NOTE — PROGRESS NOTES
Wounds cleansed, assessed, and photos taken for chart. Hypochlorous Acid soak to wound bed allowed to dwell for approximately 3 minutes.     Provider debridement visit. See flowsheet for full details. Dressings applied.    Home Health Wound care orders updated and routed to [facility] via [email or web address].

## 2025-03-12 ENCOUNTER — APPOINTMENT (OUTPATIENT)
Dept: WOUND CARE | Facility: MEDICAL CENTER | Age: 89
End: 2025-03-12
Attending: NURSE PRACTITIONER
Payer: MEDICARE

## 2025-03-20 ENCOUNTER — OFFICE VISIT (OUTPATIENT)
Dept: WOUND CARE | Facility: MEDICAL CENTER | Age: 89
End: 2025-03-20
Attending: NURSE PRACTITIONER
Payer: MEDICARE

## 2025-03-20 DIAGNOSIS — S61.511A: ICD-10-CM

## 2025-03-20 DIAGNOSIS — S81.812A ISTAP TYPE 2 SKIN TEAR OF LEFT LOWER LEG: ICD-10-CM

## 2025-03-20 DIAGNOSIS — S51.811A ISTAP TYPE 2 SKIN TEAR OF RIGHT FOREARM: ICD-10-CM

## 2025-03-20 DIAGNOSIS — Z86.73 HISTORY OF CVA (CEREBROVASCULAR ACCIDENT): ICD-10-CM

## 2025-03-20 DIAGNOSIS — S31.109A WOUND OF RIGHT GROIN, INITIAL ENCOUNTER: ICD-10-CM

## 2025-03-20 DIAGNOSIS — Z95.0 S/P PLACEMENT OF CARDIAC PACEMAKER: ICD-10-CM

## 2025-03-20 PROCEDURE — 99213 OFFICE O/P EST LOW 20 MIN: CPT | Mod: 25 | Performed by: NURSE PRACTITIONER

## 2025-03-20 PROCEDURE — 11042 DBRDMT SUBQ TIS 1ST 20SQCM/<: CPT

## 2025-03-20 PROCEDURE — 11042 DBRDMT SUBQ TIS 1ST 20SQCM/<: CPT | Performed by: NURSE PRACTITIONER

## 2025-03-20 PROCEDURE — 99213 OFFICE O/P EST LOW 20 MIN: CPT

## 2025-03-20 NOTE — PROGRESS NOTES
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  He does suffer from balance issues.  Uses front wheel walker.  Once discharged from [facility] patient has had home health through [facility] for physical therapy and speech.  He continues to see speech therapy.  On 2/8/2025, patient was bending over putting dishes into the  and fell which caused multiple skin tears to his left knee, right forearm and wrist.  EMS was called.  He was taken to [facility] ED for evaluation of head injury as patient did strike his head while on Eliquis.  Laceration to his right wrist was reapproximated and sutured.  The other laceration to wrist was closed with Dermabond and Steri-Strips.  His workup was negative and discharged.  Was referred to [facility] wound care clinic for further treatment and care.  On 2/10/2025, patient was seen at [facility] ED for wound assessment and dressing change.  Patient was referred to home health.        Pertinent Medical History: CVA with left leg weakness-on Eliquis    TOBACCO USE:  no   Alcohol drink daily    Patient's problem list, allergies, and current medications reviewed and updated in Epic    Interval History:  2/18/25: Initial wound evaluation, initial provider Clinic visit with Eli BENAVDIES, GINNY-BC, CWON, CFCN.  Pt denies fevers, chills, nausea, vomiting.  Patient accompanied by wife, Pat.  Patient followed up with PCP office yesterday and sutures were removed to the wrist yesterday.  Patient complains of generalized soreness from previous fall.  Updated order sent to home health.  Patient  does have BLE edema.  Previously given DAVID hose and wife purchased OTC zipper compression socks to control his edema however patient declined to wear.  Discussed use of controlling edema for improved wound healing.  Offered Tubigrip's.  Patient declined.  Medication list updated.    2/26/2025: Clinic visit with Noe Aguirre MD. Patient reports feeling in normal state of health. Accompanied by wife. He has new skin tear of left lower extremity, reports dog jumped off lap and caused new skin tear. Patient noted that he has drainage form left lower extremity wound that saturated the bed. Discussed that his chronic lower extremity edema is causing wound to drain heavily and will prevent wound from healing. He has been resistant to compression but agreeable to try tubigrip this week. Reinforced importance of compression and demonstrated various compression garments. He will think about if he will be willing to wear compression in the future.    3/10/25: Clinic visit with Eli BENAVIDES, MELECIO, DALTON, CFMYRON.  Pt denies fevers, chills, nausea, vomiting.  Accompanied by wife and daughter.  Intermittently using Tubigrip.  - Had pacemaker placed by Dr. Santos at [facility] last week.  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  Right wrist, left knee and right forearm skin tear improved.  Left anterior lower leg ulcer with thick slough, although area improved.    3/20/2025 : Clinic visit with MAKAYLA De Anda, MELECIO, ETIENNEN, CFMYRON.   Patient is here today with his wife and daughter.  He states he is feeling well over all.  His arm wounds are essentially resolved, present with dry flaking skin.  Left lower leg wound measures about the same, thick slough to wound bed.  Right groin wound also has loose slough.      REVIEW OF SYSTEMS:   Unchanged from previous wound clinic assessment on 3/10/2025, except as noted in  interval history above      PHYSICAL EXAMINATION:   There were no vitals taken for this visit.    Physical Exam  Cardiovascular:      Pulses: Normal pulses.      Comments: Left DP and PT pulse palpable, +2  Pulmonary:      Effort: Pulmonary effort is normal.   Musculoskeletal:      Right lower leg: Edema present.      Left lower leg: Edema present.      Comments: Bilateral lower extremity edema, 3+ pitting   Skin:     Comments: Left knee skin tear: Full-thickness, crust, no drainage. No evidence of infection.    Right wrist: Full-thickness, scattered thin crust   right forearm: Full-thickness, area decreased, hypergranular tissue    Left anterior lower extremity skin tear:  skin tear secondary to dog.  Thick slough, area decreased, tenderness with palpation  No evidence of infection.    Right groin: Insertion site cardiac pacemaker.  Green suture noted with slight dehiscence surrounding suture with adherent slough.  To MEAGHAN skin ruptured bullae, intact serous bullae inferior aspect, deflated sanguinous filled bullae medial aspect, ecchymosis to periwound, edematous, area soft, no induration   Neurological:      General: No focal deficit present.      Mental Status: He is alert.         WOUND ASSESSMENT  Wound 02/18/25 Traumatic Wrist Distal Right --Right Wrist (Active)   Wound Image   03/20/25 1500   Site Assessment Epithelialization 03/20/25 1500   Periwound Assessment Dry;Fragile;Flaky 03/20/25 1500   Margins Attached edges 03/20/25 1500   Drainage Amount None 03/20/25 1500   Drainage Description Serosanguineous 02/26/25 1147   Treatments Cleansed;Site care 03/20/25 1500   Wound Cleansing Hypochlorus Acid 03/20/25 1500   Periwound Protectant Skin Moisturizer 03/20/25 1500   Dressing Changed New 02/18/25 0840   Dressing Cleansing/Solutions Not Applicable 03/20/25 1500   Dressing Options Triad [place];Tubigrip 03/20/25 1500   Dressing Change/Treatment Frequency As Needed 03/20/25 1500   Non-staged Wound Description  Full thickness 03/10/25 1700   Wound Length (cm) 0.2 cm 03/20/25 1500   Wound Width (cm) 0.2 cm 03/20/25 1500   Wound Depth (cm) 0.1 cm 03/20/25 1500   Wound Surface Area (cm^2) 0.04 cm^2 03/20/25 1500   Wound Volume (cm^3) 0.004 cm^3 03/20/25 1500   Post-Procedure Length (cm) 3.5 cm 03/10/25 1700   Post-Procedure Width (cm) 0.5 cm 03/10/25 1700   Post-Procedure Depth (cm) 0.1 cm 03/10/25 1700   Post-Procedure Surface Area (cm^2) 1.75 cm^2 03/10/25 1700   Post-Procedure Volume (cm^3) 0.175 cm^3 03/10/25 1700   Wound Healing % 100 03/20/25 1500   Tunneling (cm) 0 cm 03/20/25 1500   Undermining (cm) 0 cm 03/20/25 1500   Wound Odor None 03/20/25 1500   Exposed Structures None 03/20/25 1500   Number of days: 30       Wound 02/18/25 Traumatic Arm Right --Right Forearm (Active)   Wound Image   03/20/25 1500   Site Assessment Epithelialization 03/20/25 1500   Periwound Assessment Fragile;Flaky;Dry 03/20/25 1500   Margins Attached edges 03/20/25 1500   Drainage Amount None 03/20/25 1500   Drainage Description Serosanguineous 03/10/25 1700   Treatments Cleansed;Site care 03/20/25 1500   Wound Cleansing Hypochlorus Acid 03/20/25 1500   Periwound Protectant Skin Moisturizer 03/20/25 1500   Dressing Changed New 02/18/25 0840   Dressing Cleansing/Solutions Not Applicable 03/20/25 1500   Dressing Options Triad [place];Tubigrip 03/20/25 1500   Dressing Change/Treatment Frequency As Needed 03/20/25 1500   Non-staged Wound Description Full thickness 03/10/25 1700   Wound Length (cm) 0.4 cm 03/20/25 1500   Wound Width (cm) 0.2 cm 03/20/25 1500   Wound Depth (cm) 0.1 cm 03/20/25 1500   Wound Surface Area (cm^2) 0.08 cm^2 03/20/25 1500   Wound Volume (cm^3) 0.008 cm^3 03/20/25 1500   Post-Procedure Length (cm) 0.7 cm 03/10/25 1700   Post-Procedure Width (cm) 1.2 cm 03/10/25 1700   Post-Procedure Depth (cm) 0.1 cm 03/10/25 1700   Post-Procedure Surface Area (cm^2) 0.84 cm^2 03/10/25 1700   Post-Procedure Volume (cm^3) 0.084 cm^3 03/10/25  1700   Wound Healing % 100 03/20/25 1500   Tunneling (cm) 0 cm 03/20/25 1500   Undermining (cm) 0 cm 03/20/25 1500   Wound Odor None 03/20/25 1500   Exposed Structures None 03/20/25 1500   Number of days: 30       Wound 02/26/25 Traumatic Leg Lower Left -- Left anterolateral LE (Active)   Wound Image    03/20/25 1500   Site Assessment Grey;Green;Yellow;Red 03/20/25 1500   Periwound Assessment Edema;Fragile 03/20/25 1500   Margins Unattached edges 03/20/25 1500   Drainage Amount Moderate 03/20/25 1500   Drainage Description Serosanguineous 03/20/25 1500   Treatments Cleansed;Topical Lidocaine;Provider debridement;Site care 03/20/25 1500   Wound Cleansing Hypochlorus Acid 03/20/25 1500   Periwound Protectant Moisture Barrier;Skin Moisturizer;Skin Protectant Wipes to Periwound 03/20/25 1500   Dressing Cleansing/Solutions Not Applicable 03/20/25 1500   Dressing Options Antimicrobial 7 Day (Acticoat 7);Silicone Adhesive Foam;Tubigrip 03/20/25 1500   Dressing Change/Treatment Frequency Weekly, and As Needed 03/20/25 1500   Non-staged Wound Description Full thickness 03/20/25 1500   Wound Length (cm) 2.4 cm 03/20/25 1500   Wound Width (cm) 2.8 cm 03/20/25 1500   Wound Depth (cm) 0.2 cm 03/20/25 1500   Wound Surface Area (cm^2) 6.72 cm^2 03/20/25 1500   Wound Volume (cm^3) 1.344 cm^3 03/20/25 1500   Post-Procedure Length (cm) 2.4 cm 03/20/25 1500   Post-Procedure Width (cm) 3.3 cm 03/20/25 1500   Post-Procedure Depth (cm) 0.3 cm 03/20/25 1500   Post-Procedure Surface Area (cm^2) 7.92 cm^2 03/20/25 1500   Post-Procedure Volume (cm^3) 2.376 cm^3 03/20/25 1500   Wound Healing % -68 03/20/25 1500   Tunneling (cm) 0 cm 03/20/25 1500   Undermining (cm) 0 cm 03/20/25 1500   Wound Odor None 03/20/25 1500   Pulses Left;2+;DP;PT 03/20/25 1500   Exposed Structures None 03/20/25 1500   Number of days: 22       Wound 03/10/25 Full Thickness Wound Groin Anterior Right Surgical incision with surrounding MARSI (Active)   Wound Image     03/20/25 1500   Site Assessment Red;Yellow;Slough 03/20/25 1500   Periwound Assessment Edema;Fragile 03/20/25 1500   Margins Unattached edges 03/20/25 1500   Closure Sutures 03/20/25 1500   Drainage Amount Small 03/20/25 1500   Drainage Description Serosanguineous 03/20/25 1500   Treatments Cleansed;Topical Lidocaine;Provider debridement;Site care 03/20/25 1500   Wound Cleansing Hypochlorus Acid 03/20/25 1500   Periwound Protectant Moisture Barrier;Skin Protectant Wipes to Periwound 03/20/25 1500   Dressing Status Old drainage 03/20/25 1500   Dressing Changed New 03/20/25 1500   Dressing Cleansing/Solutions Not Applicable 03/20/25 1500   Dressing Options Hydrofiber Silver;Hydrocolloid Thick 03/20/25 1500   Dressing Change/Treatment Frequency Weekly, and As Needed 03/20/25 1500   Wound Team Following Weekly 03/20/25 1500   Non-staged Wound Description Full thickness 03/20/25 1500   Wound Length (cm) 0.4 cm 03/20/25 1500   Wound Width (cm) 1.5 cm 03/20/25 1500   Wound Depth (cm) 0.3 cm 03/20/25 1500   Wound Surface Area (cm^2) 0.6 cm^2 03/20/25 1500   Wound Volume (cm^3) 0.18 cm^3 03/20/25 1500   Post-Procedure Length (cm) 0.6 cm 03/20/25 1500   Post-Procedure Width (cm) 1.7 cm 03/20/25 1500   Post-Procedure Depth (cm) 0.3 cm 03/20/25 1500   Post-Procedure Surface Area (cm^2) 1.02 cm^2 03/20/25 1500   Post-Procedure Volume (cm^3) 0.306 cm^3 03/20/25 1500   Wound Healing % 98 03/20/25 1500   Tunneling (cm) 0 cm 03/20/25 1500   Undermining (cm) 0 cm 03/20/25 1500   Wound Odor None 03/20/25 1500   Exposed Structures Sutures 03/20/25 1500   Number of days: 10       PROCEDURE: Excisional debridement of  left anterior lower extremity wound  -2% viscous lidocaine applied topically to wound bed for approximately 5 minutes prior to debridement  -Curette used to debride wound bed.  Excisional debridement was performed to remove devitalized tissue until healthy, bleeding tissue was visualized.  Total area debrided was 7.92 cm².   Tissue debrided into the subcutaneous layer.    -Bleeding controlled with manual pressure.    -Wound care completed by wound RN, refer to flowsheet  -Patient tolerated majority the procedure well, without c/o pain or discomfort.     PROCEDURE: Excisional debridement right groin wound  -2% viscous lidocaine applied topically to wound bed for approximately 5 minutes prior to debridement  -Forceps and scissors used to lift and excise loose slough, and then to debride wound bed.  Excisional debridement was performed to remove devitalized tissue until healthy, bleeding tissue was visualized.   Entire surface of wound, 1.02 cm² debrided.  Tissue debrided into the subcutaneous layer.    -Bleeding controlled with manual pressure.    -Wound care completed by wound RN, refer to flowsheet  -Patient tolerated the procedure well, without c/o pain or discomfort.      Pertinent Labs and Diagnostics:    Labs:     A1c:   Lab Results   Component Value Date/Time    HBA1C 5.9 (H) 01/03/2019 11:40 AM          IMAGING: see epic    VASCULAR STUDIES: see epic    LAST  WOUND CULTURE:  DATE :   Lab Results   Component Value Date/Time    CULTRSULT Rare growth usual skin gege. 01/17/2024 09:39 AM    CULTRSULT No fungal growth. 01/17/2024 09:39 AM         ASSESSMENT AND PLAN:     1. ISTAP type 2 skin tear of right forearm  2. ISTAP type 2 skin tear of right wrist  3. ISTAP type 2 skin tear of left lower leg  Ground-level fall causing skin tears on 2/8/2025    3/20/2025: Right wrist and forearm skin tears are resolved, present with dry flaking skin.  Left lower extremity wound measures slightly larger, thick slough to wound bed.  -- Excisional debridement of left lower leg wound performed today.  Medically necessary to promote wound healing.  -Patient to return to clinic weekly for assessment and debridement  - Home health to change dressing 1-2 times per week in between clinic visits.  Established with [facility].        Wound care:  Acticoat Flex, foam cover dressing, Tubigrip    4. History of CVA (cerebrovascular accident)    3/20/2025: CVA on 11/1/2024.  Treated at [facility] discharged to [facility] rehab for 3 weeks.  Since then patient discharged with [facility] for physical therapy and speech therapy.  He continues with speech therapy  - He does have balance issues with residual left leg weakness.  Uses front wheeled walker.  - Physical therapy referral to reevaluate patient previously offered, patient declined  - Continue with wound care and speech therapy through [facility].    5. Bilateral lower extremity edema    3/20/2025  - Patient with lower extremity edema, likely multifactorial including cardiac, dependent edema, and venous disease as evidenced by hemosiderin staining  - Does not wear compression consistently  - Counseled on importance of compression therapy to manage edema for wound healing and prevention of future wounds.  -Patient has compression garments at home, however refuses to wear them.  Patient agrees to allow for application of Tubigrip today.    -Per wife and daughter, patient reluctant to wear compression garments.  He has several different types.      6.  Wound of right groin, initial encounter  7.  S/P placement of cardiac pacemaker    3/20/2025: Cardiac pacemaker placed by Dr. Santos at [facility] 3/3/2025.  Full-thickness wound at insertion site.  No evidence of wound infection today  -Excisional debridement of wound in clinic today, medically necessary to promote wound healing.  -Patient to return to clinic weekly for assessment and debridement  -Health to change dressing 1-2 times per week in between clinic visits   -Follow-up with cardiology    Wound care: Right groin-silver Hydrofiber, thick hydrocolloid, Tubigrip      PATIENT EDUCATION  - Importance of adequate nutrition for wound healing  -Advised to go to ER for any increased redness, swelling,  drainage, or odor, or if patient develops fever, chills, nausea or vomiting.     My total time spent caring for the patient on the day of the encounter was 20 minutes, reviewing history, assessment, counseling and education, and coordination of care including counseling, formulating plan for new wound, and demonstrating use of compression garments.  This does not include time spent on separately billable procedures/tests.    Please note that this note may have been created using voice recognition software. I have worked with technical experts from [facility] to optimize the interface.  I have made every reasonable attempt to correct obvious errors, but there may be errors of grammar and possibly content that I did not discover before finalizing the note.    N

## 2025-03-20 NOTE — PATIENT INSTRUCTIONS
-Keep your wound dressing clean, dry, and intact. Only change dressing if it's over saturated, soiled or falls off.     -Change your dressing if it becomes soiled, soaked, or falls off.    -Should you experience any significant changes in your wound(s), such as infection (redness, swelling, localized heat, increased pain, fever > 101 F, chills) or have any questions regarding your home care instructions, please contact the wound center at [phone]. If after hours, contact your primary care physician or go to the hospital emergency room.     If you are admitted to any hospital, you will need a new referral to come back to the wound clinic and any scheduled appointments that you already have, may be cancelled.

## 2025-03-20 NOTE — PROGRESS NOTES
Patient to be discharge from [facility] services tomorrow, 3/21. Provided family with leftover bandages in case dressings become soiled or loose before next clinic appointment.

## 2025-03-28 ENCOUNTER — NON-PROVIDER VISIT (OUTPATIENT)
Dept: WOUND CARE | Facility: MEDICAL CENTER | Age: 89
End: 2025-03-28
Attending: NURSE PRACTITIONER
Payer: MEDICARE

## 2025-03-28 DIAGNOSIS — S81.812A ISTAP TYPE 2 SKIN TEAR OF LEFT LOWER LEG: ICD-10-CM

## 2025-03-28 DIAGNOSIS — S51.811A ISTAP TYPE 2 SKIN TEAR OF RIGHT FOREARM: ICD-10-CM

## 2025-03-28 DIAGNOSIS — Z95.0 S/P PLACEMENT OF CARDIAC PACEMAKER: ICD-10-CM

## 2025-03-28 DIAGNOSIS — Z86.73 HISTORY OF CVA (CEREBROVASCULAR ACCIDENT): ICD-10-CM

## 2025-03-28 DIAGNOSIS — S61.511A: ICD-10-CM

## 2025-03-28 DIAGNOSIS — R60.0 BILATERAL LOWER EXTREMITY EDEMA: ICD-10-CM

## 2025-03-28 DIAGNOSIS — S31.109D WOUND OF RIGHT GROIN, SUBSEQUENT ENCOUNTER: ICD-10-CM

## 2025-03-28 PROCEDURE — 11042 DBRDMT SUBQ TIS 1ST 20SQCM/<: CPT

## 2025-03-28 NOTE — PROGRESS NOTES
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  He does suffer from balance issues.  Uses front wheel walker.  Once discharged from [facility] patient has had home health through [facility] for physical therapy and speech.  He continues to see speech therapy.  On 2/8/2025, patient was bending over putting dishes into the  and fell which caused multiple skin tears to his left knee, right forearm and wrist.  EMS was called.  He was taken to [facility] ED for evaluation of head injury as patient did strike his head while on Eliquis.  Laceration to his right wrist was reapproximated and sutured.  The other laceration to wrist was closed with Dermabond and Steri-Strips.  His workup was negative and discharged.  Was referred to [facility] wound care clinic for further treatment and care.  On 2/10/2025, patient was seen at [facility] ED for wound assessment and dressing change.  Patient was referred to home health.        Pertinent Medical History: CVA with left leg weakness-on Eliquis    TOBACCO USE:  no   Alcohol drink daily    Patient's problem list, allergies, and current medications reviewed and updated in Epic    Interval History:  2/18/25: Initial wound evaluation, initial provider Clinic visit with Eli BENAVIDES, GINNY-BC, CWON, CFMYRON.  Pt denies fevers, chills, nausea, vomiting.  Patient accompanied by wife, Pat.  Patient followed up with PCP office yesterday and sutures were removed to the wrist yesterday.  Patient complains of generalized soreness from previous fall.  Updated order sent to  home health.  Patient does have BLE edema.  Previously given DAVID hose and wife purchased OTC zipper compression socks to control his edema however patient declined to wear.  Discussed use of controlling edema for improved wound healing.  Offered Tubigrip's.  Patient declined.  Medication list updated.    2/26/2025: Clinic visit with Noe Aguirre MD. Patient reports feeling in normal state of health. Accompanied by wife. He has new skin tear of left lower extremity, reports dog jumped off lap and caused new skin tear. Patient noted that he has drainage form left lower extremity wound that saturated the bed. Discussed that his chronic lower extremity edema is causing wound to drain heavily and will prevent wound from healing. He has been resistant to compression but agreeable to try tubigrip this week. Reinforced importance of compression and demonstrated various compression garments. He will think about if he will be willing to wear compression in the future.    3/10/25: Clinic visit with Eli BENAVIDES, MELECIO, DALTON, CFMYRON.  Pt denies fevers, chills, nausea, vomiting.  Accompanied by wife and daughter.  Intermittently using Tubigrip.  - Had pacemaker placed by Dr. Santos at [facility] last week.  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  Right wrist, left knee and right forearm skin tear improved.  Left anterior lower leg ulcer with thick slough, although area improved.    3/20/2025 : Clinic visit with MAKAYLA De Anda, MELECIO, ROCHELLE, CFMYRON.   Patient is here today with his wife and daughter.  He states he is feeling well over all.  His arm wounds are essentially resolved, present with dry flaking skin.  Left lower leg wound measures about the same, thick slough to wound bed.  Right groin wound also has loose slough.    3/28/2025 : Clinic visit with MAKAYLA De Anda, MELECIO, ROCHELLE, CFMYRON.   Patient  states he is feeling well.  All of his arm wounds have healed.  His right groin wound is almost resolved, quite small with minimal drainage.  Left lower extremity wound measures smaller as well.      REVIEW OF SYSTEMS:   Unchanged from previous wound clinic assessment on 3/20/2025, except as noted in interval history above      PHYSICAL EXAMINATION:   There were no vitals taken for this visit.    Physical Exam  Cardiovascular:      Pulses: Normal pulses.      Comments: Left DP and PT pulse palpable, +2  Pulmonary:      Effort: Pulmonary effort is normal.   Musculoskeletal:      Right lower leg: Edema present.      Left lower leg: Edema present.      Comments: Bilateral lower extremity edema, 3+ pitting   Skin:     Comments: Left anterior lower extremity skin tear:  skin tear secondary to dog scratch.  Area measures smaller.  Thin layer of slough to wound bed.  Moderate serosanguineous drainage, no odor.  No periwound erythema or induration.    Right groin: Insertion site cardiac pacemaker.  Wound area has decreased significantly.  Depth nearly at skin level.  Wound bed clean.  Minimal serous drainage.  No periwound erythema or induration   Neurological:      General: No focal deficit present.      Mental Status: He is alert.         WOUND ASSESSMENT  Wound 02/26/25 Traumatic Leg Lower Left -- Left anterolateral LE (Active)   Wound Image    03/28/25 1430   Site Assessment Red;Yellow 03/28/25 1430   Periwound Assessment Scar tissue;Fragile;Edema 03/28/25 1430   Margins Attached edges 03/28/25 1430   Drainage Amount Small 03/28/25 1430   Drainage Description Serosanguineous 03/28/25 1430   Treatments Cleansed;Topical Lidocaine;Provider debridement;Site care 03/28/25 1430   Wound Cleansing Hypochlorus Acid 03/28/25 1430   Periwound Protectant No-sting Skin Prep 03/28/25 1430   Dressing Cleansing/Solutions Not Applicable 03/28/25 1430   Dressing Options Antimicrobial 7 Day (Acticoat 7);Silicone Adhesive Foam;Tubigrip  03/28/25 1430   Dressing Change/Treatment Frequency Twice Weekly 03/28/25 1430   Wound Team Following Weekly 03/28/25 1430   Non-staged Wound Description Full thickness 03/28/25 1430   Wound Length (cm) 0.9 cm 03/28/25 1430   Wound Width (cm) 2.8 cm 03/28/25 1430   Wound Depth (cm) 0.1 cm 03/28/25 1430   Wound Surface Area (cm^2) 2.52 cm^2 03/28/25 1430   Wound Volume (cm^3) 0.252 cm^3 03/28/25 1430   Post-Procedure Length (cm) 0.9 cm 03/28/25 1430   Post-Procedure Width (cm) 3 cm 03/28/25 1430   Post-Procedure Depth (cm) 0.1 cm 03/28/25 1430   Post-Procedure Surface Area (cm^2) 2.7 cm^2 03/28/25 1430   Post-Procedure Volume (cm^3) 0.27 cm^3 03/28/25 1430   Wound Healing % 69 03/28/25 1430   Tunneling (cm) 0 cm 03/28/25 1430   Undermining (cm) 0 cm 03/28/25 1430   Wound Odor None 03/28/25 1430   Pulses Left;2+;DP;PT 03/20/25 1500   Exposed Structures None 03/28/25 1430   Number of days: 30       Wound 03/10/25 Full Thickness Wound Groin Anterior Right Surgical incision with surrounding MARSI (Active)   Wound Image   03/28/25 1430   Site Assessment Red;Granulation tissue 03/28/25 1430   Periwound Assessment Fragile;Scar tissue 03/28/25 1430   Margins Attached edges 03/28/25 1430   Closure Sutures 03/20/25 1500   Drainage Amount Small 03/28/25 1430   Drainage Description Serosanguineous 03/28/25 1430   Treatments Cleansed;Site care 03/28/25 1430   Wound Cleansing Hypochlorus Acid 03/28/25 1430   Periwound Protectant No-sting Skin Prep 03/28/25 1430   Dressing Status Old drainage 03/20/25 1500   Dressing Changed Changed 03/28/25 1430   Dressing Cleansing/Solutions Not Applicable 03/28/25 1430   Dressing Options Hydrofiber Silver;Hydrocolloid Thick 03/28/25 1430   Dressing Change/Treatment Frequency Twice Weekly 03/28/25 1430   Wound Team Following Weekly 03/28/25 1430   Non-staged Wound Description Full thickness 03/28/25 1430   Wound Length (cm) 0.5 cm 03/28/25 1430   Wound Width (cm) 1 cm 03/28/25 1430   Wound Depth  (cm) 0.1 cm 03/28/25 1430   Wound Surface Area (cm^2) 0.5 cm^2 03/28/25 1430   Wound Volume (cm^3) 0.05 cm^3 03/28/25 1430   Post-Procedure Length (cm) 0.6 cm 03/20/25 1500   Post-Procedure Width (cm) 1.7 cm 03/20/25 1500   Post-Procedure Depth (cm) 0.3 cm 03/20/25 1500   Post-Procedure Surface Area (cm^2) 1.02 cm^2 03/20/25 1500   Post-Procedure Volume (cm^3) 0.306 cm^3 03/20/25 1500   Wound Healing % 100 03/28/25 1430   Tunneling (cm) 0 cm 03/28/25 1430   Undermining (cm) 0 cm 03/28/25 1430   Wound Odor None 03/28/25 1430   Exposed Structures None 03/28/25 1430   Number of days: 18       PROCEDURE: Excisional debridement of  left anterior lower extremity wound  -2% viscous lidocaine applied topically to wound bed for approximately 5 minutes prior to debridement  -Curette used to debride wound bed.  Excisional debridement was performed to remove devitalized tissue until healthy, bleeding tissue was visualized.  Total area debrided was 2.7 cm².  Tissue debrided into the subcutaneous layer.    -Bleeding controlled with manual pressure.    -Wound care completed by wound RN, refer to flowsheet  -Patient tolerated majority the procedure well, without c/o pain or discomfort.     No excisional debridement of right groin wound required today    Pertinent Labs and Diagnostics:    Labs:     A1c:   Lab Results   Component Value Date/Time    HBA1C 5.9 (H) 01/03/2019 11:40 AM          IMAGING: see epic    VASCULAR STUDIES: see epic    LAST  WOUND CULTURE:  DATE :   Lab Results   Component Value Date/Time    CULTRSULT Rare growth usual skin gege. 01/17/2024 09:39 AM    CULTRSULT No fungal growth. 01/17/2024 09:39 AM         ASSESSMENT AND PLAN:     1. ISTAP type 2 skin tear of right forearm  2. ISTAP type 2 skin tear of right wrist  3. ISTAP type 2 skin tear of left lower leg  Ground-level fall causing skin tears on 2/8/2025    3/28/2025: All of these wounds are resolved      Wound care: Open to air, moisturizer to skin    4.  History of CVA (cerebrovascular accident)    3/28/2025: CVA on 11/1/2024.  Treated at [facility] discharged to [facility] rehab for 3 weeks.  Since then patient discharged with [place] [facility] for physical therapy and speech therapy.  He continues with speech therapy  - He does have balance issues with residual left leg weakness.  Uses front wheeled walker.  - Physical therapy referral to reevaluate patient previously offered, patient declined  - Continue with wound care and speech therapy through home health.    5. Bilateral lower extremity edema    3/28/2025  - Patient with lower extremity edema, likely multifactorial including cardiac, dependent edema, and venous disease as evidenced by hemosiderin staining  - Does not wear compression consistently  -He has been counseled on importance of compression therapy to manage edema for wound healing and prevention of future wounds.  -Per wife and daughter, patient reluctant to wear compression garments.  He has several different types.      6.  Wound of right groin, subsequent encounter  7.  S/P placement of cardiac pacemaker    3/28/2025: Cardiac pacemaker placed by Dr. Santos at [facility] 3/3/2025.  Full-thickness wound at insertion site.  No evidence of wound infection today  -Wound nearly resolved, measures smaller, depth at skin level.    Wound care: Right groin-silver Hydrofiber, thick hydrocolloid,     PATIENT EDUCATION  - Importance of adequate nutrition for wound healing  -Advised to go to ER for any increased redness, swelling, drainage, or odor, or if patient develops fever, chills, nausea or vomiting.         Please note that this note may have been created using voice recognition software. I have worked with technical experts from [employer] to optimize the interface.  I have made every reasonable attempt to correct obvious errors, but there may be errors of grammar and possibly content that I did not  discover before finalizing the note.    N

## 2025-03-28 NOTE — PATIENT INSTRUCTIONS
-Keep your wound dressing clean, dry, and intact. Change dressing if it's over saturated, soiled or falls off.     -Should you experience any significant changes in your wound(s), such as infection (redness, swelling, localized heat, increased pain, fever > 101 F, chills) or have any questions regarding your home care instructions, please contact the wound center at [phone]. If after hours, contact your primary care physician or go to the hospital emergency room.     If you are admitted to any hospital, you will need a new referral to come back to the wound clinic and any scheduled appointments that you already have, may be cancelled.

## 2025-04-02 ENCOUNTER — OFFICE VISIT (OUTPATIENT)
Dept: WOUND CARE | Facility: MEDICAL CENTER | Age: 89
End: 2025-04-02
Attending: NURSE PRACTITIONER
Payer: MEDICARE

## 2025-04-02 DIAGNOSIS — Z86.73 HISTORY OF CVA (CEREBROVASCULAR ACCIDENT): ICD-10-CM

## 2025-04-02 DIAGNOSIS — S51.811A ISTAP TYPE 2 SKIN TEAR OF RIGHT FOREARM: ICD-10-CM

## 2025-04-02 DIAGNOSIS — S31.109D WOUND OF RIGHT GROIN, SUBSEQUENT ENCOUNTER: Primary | ICD-10-CM

## 2025-04-02 DIAGNOSIS — Z95.0 S/P PLACEMENT OF CARDIAC PACEMAKER: ICD-10-CM

## 2025-04-02 DIAGNOSIS — S81.812A ISTAP TYPE 2 SKIN TEAR OF LEFT LOWER LEG: ICD-10-CM

## 2025-04-02 DIAGNOSIS — R60.0 BILATERAL LOWER EXTREMITY EDEMA: ICD-10-CM

## 2025-04-02 DIAGNOSIS — S61.511A: ICD-10-CM

## 2025-04-02 PROCEDURE — 11042 DBRDMT SUBQ TIS 1ST 20SQCM/<: CPT | Performed by: STUDENT IN AN ORGANIZED HEALTH CARE EDUCATION/TRAINING PROGRAM

## 2025-04-02 PROCEDURE — 11042 DBRDMT SUBQ TIS 1ST 20SQCM/<: CPT

## 2025-04-02 NOTE — PROGRESS NOTES
Advanced Wound Care   [facility]   [address]   [address]   [place] [address]   [phone] Fax: [phone]        DME: [facility]  Duration of Supply Order: 90 days  Dispense as written.    Wound care supply order entered via [employer] online portal.     Wound 02/26/25 Traumatic Leg Lower Left -- Left anterolateral LE (Active)   Wound Image    04/02/25 1643   Site Assessment Red;Yellow 04/02/25 1643   Periwound Assessment Scar tissue;Edema;Fragile 04/02/25 1643   Margins Attached edges 04/02/25 1643   Drainage Amount Moderate 04/02/25 1643   Drainage Description Serosanguineous 04/02/25 1643   Treatments Cleansed;Topical Lidocaine;Provider debridement;Site care 04/02/25 1643   Wound Cleansing Hypochlorus Acid 04/02/25 1643   Periwound Protectant Skin Moisturizer 04/02/25 1643   Dressing Cleansing/Solutions Not Applicable 04/02/25 1643   Dressing Options Hydrofiber Silver;Silicone Adhesive Foam;Tubigrip 04/02/25 1643   Dressing Change/Treatment Frequency Twice Weekly 04/02/25 1643   Wound Team Following Weekly 03/28/25 1430   Non-staged Wound Description Full thickness 04/02/25 1643   Wound Length (cm) 0.7 cm 04/02/25 1643   Wound Width (cm) 2.2 cm 04/02/25 1643   Wound Depth (cm) 0.1 cm 04/02/25 1643   Wound Surface Area (cm^2) 1.54 cm^2 04/02/25 1643   Wound Volume (cm^3) 0.154 cm^3 04/02/25 1643   Post-Procedure Length (cm) 0.8 cm 04/02/25 1643   Post-Procedure Width (cm) 2.2 cm 04/02/25 1643   Post-Procedure Depth (cm) 0.1 cm 04/02/25 1643   Post-Procedure Surface Area (cm^2) 1.76 cm^2 04/02/25 1643   Post-Procedure Volume (cm^3) 0.176 cm^3 04/02/25 1643   Wound Healing % 81 04/02/25 1643   Tunneling (cm) 0 cm 04/02/25 1643   Undermining (cm) 0 cm 04/02/25 1643   Wound Odor None 04/02/25 1643   Pulses Left;2+;DP;PT 03/20/25 1500   Exposed Structures None 04/02/25 1643   Number of days: 35       Wound 03/10/25 Full Thickness Wound Groin Anterior Right Surgical  incision with surrounding MARSI (Active)   Wound Image   04/02/25 1643   Site Assessment Red;Bleeding 04/02/25 1643   Periwound Assessment Fragile;Scar tissue 04/02/25 1643   Margins Attached edges 04/02/25 1643   Closure Sutures 03/20/25 1500   Drainage Amount Moderate 04/02/25 1643   Drainage Description Sanguineous 04/02/25 1643   Treatments Cleansed;Site care 04/02/25 1643   Wound Cleansing Hypochlorus Acid 04/02/25 1643   Periwound Protectant Not Applicable 04/02/25 1643   Dressing Status Old drainage 03/20/25 1500   Dressing Changed Changed 03/28/25 1430   Dressing Cleansing/Solutions Not Applicable 04/02/25 1643   Dressing Options Hydrofiber Silver;Hydrocolloid Thick 04/02/25 1643   Dressing Change/Treatment Frequency Twice Weekly 04/02/25 1643   Wound Team Following Weekly 03/28/25 1430   Non-staged Wound Description Full thickness 04/02/25 1643   Wound Length (cm) 0.5 cm 04/02/25 1643   Wound Width (cm) 1 cm 04/02/25 1643   Wound Depth (cm) 0.1 cm 04/02/25 1643   Wound Surface Area (cm^2) 0.5 cm^2 04/02/25 1643   Wound Volume (cm^3) 0.05 cm^3 04/02/25 1643   Post-Procedure Length (cm) 0.6 cm 03/20/25 1500   Post-Procedure Width (cm) 1.7 cm 03/20/25 1500   Post-Procedure Depth (cm) 0.3 cm 03/20/25 1500   Post-Procedure Surface Area (cm^2) 1.02 cm^2 03/20/25 1500   Post-Procedure Volume (cm^3) 0.306 cm^3 03/20/25 1500   Wound Healing % 100 04/02/25 1643   Tunneling (cm) 0 cm 04/02/25 1643   Undermining (cm) 0 cm 04/02/25 1643   Wound Odor None 04/02/25 1643   Exposed Structures None 04/02/25 1643   Number of days: 23          PROCEDURE: Provider debridement using curette to remove nonviable tissue from wound bed(s). 2% topical Lidocaine applied prior to debridement with ~5 minute dwell time. Patient tolerated well; denied pain.      Patient to change dressing 2 times per week. Cleanse wound(s) with saline and gauze. Patient/caregiver to apply dressing as instructed.

## 2025-04-02 NOTE — PATIENT INSTRUCTIONS
-Keep dressings clean and dry. Change dressings every 3-4 days, and if they become over saturated, soiled or fall off.     -On the days you are not changing your dressings, avoid getting the dressings wet. It is not harmful to get your wound wet when you are washing your wound before applying a new dressing.    -If you need to change your dressings at home: Wash your wound with normal saline, wound cleanser, or unscented soap and water prior to applying your new dressings. Please avoid cleansing with hydrogen peroxide or rubbing alcohol. Hydrogen peroxide and rubbing alcohol are toxic to new tissue and skin cells.    -Avoid prolonged standing or sitting without elevating your legs.    -Remove your compression garments if you have severe pain, severe swelling, numbness, color change, or temperature change in your toes. If you need to remove your compression garments, do so by unrolling them. Do not cut the compression garments off, this is to prevent cutting yourself on accident.    -Should you experience any significant changes in your wound(s), such as signs of infection (increasing redness, swelling, localized heat, increased pain, fever > 101 F, chills) or have any questions regarding your home care instructions, please contact the wound center at [phone]. If after hours, contact your primary care physician or go to the hospital emergency room.     -If you are admitted to any hospital, you will need a new referral to come back to the wound clinic and any scheduled appointments that you already have, may be cancelled.     -If you are 5 or more minutes late for an appointment, we reserve the right to cancel and reschedule that appointment. Additionally, if you are habitually late or not showing (3 late cancellations and/or no shows), we reserve the right to cancel your remaining appointments and it will be your responsibility to obtain a new referral if services are still needed.

## 2025-04-02 NOTE — PROGRESS NOTES
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  He does suffer from balance issues.  Uses front wheel walker.  Once discharged from [facility] patient has had home health through [facility] for physical therapy and speech.  He continues to see speech therapy.  On 2/8/2025, patient was bending over putting dishes into the  and fell which caused multiple skin tears to his left knee, right forearm and wrist.  EMS was called.  He was taken to [facility] ED for evaluation of head injury as patient did strike his head while on Eliquis.  Laceration to his right wrist was reapproximated and sutured.  The other laceration to wrist was closed with Dermabond and Steri-Strips.  His workup was negative and discharged.  Was referred to [facility] wound care clinic for further treatment and care.  On 2/10/2025, patient was seen at [facility] ED for wound assessment and dressing change.  Patient was referred to home health.        Pertinent Medical History: CVA with left leg weakness-on Eliquis    TOBACCO USE:  no   Alcohol drink daily    Patient's problem list, allergies, and current medications reviewed and updated in Epic    Interval History:  2/18/25: Initial wound evaluation, initial provider Clinic visit with Eli BENAVIDES, GINNY-BC, CWON, CFMYRON.  Pt denies fevers, chills, nausea, vomiting.  Patient accompanied by wife, Pat.  Patient followed up with PCP office yesterday and sutures were removed to the wrist yesterday.  Patient complains of generalized soreness from previous fall.  Updated order sent to  home health.  Patient does have BLE edema.  Previously given DAVID hose and wife purchased OTC zipper compression socks to control his edema however patient declined to wear.  Discussed use of controlling edema for improved wound healing.  Offered Tubigrip's.  Patient declined.  Medication list updated.    2/26/2025: Clinic visit with Noe Aguirre MD. Patient reports feeling in normal state of health. Accompanied by wife. He has new skin tear of left lower extremity, reports dog jumped off lap and caused new skin tear. Patient noted that he has drainage form left lower extremity wound that saturated the bed. Discussed that his chronic lower extremity edema is causing wound to drain heavily and will prevent wound from healing. He has been resistant to compression but agreeable to try tubigrip this week. Reinforced importance of compression and demonstrated various compression garments. He will think about if he will be willing to wear compression in the future.    3/10/25: Clinic visit with Eli BENAVIDES, MELECIO, DALTON, CFMYRON.  Pt denies fevers, chills, nausea, vomiting.  Accompanied by wife and daughter.  Intermittently using Tubigrip.  - Had pacemaker placed by Dr. Santos at [facility] last week.  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  Right wrist, left knee and right forearm skin tear improved.  Left anterior lower leg ulcer with thick slough, although area improved.    3/20/2025 : Clinic visit with MAKAYLA De Anda, MELECIO, ROCHELLE, CFMYRON.   Patient is here today with his wife and daughter.  He states he is feeling well over all.  His arm wounds are essentially resolved, present with dry flaking skin.  Left lower leg wound measures about the same, thick slough to wound bed.  Right groin wound also has loose slough.    3/28/2025 : Clinic visit with MAKAYLA De Anda, MELECIO, ROCHELLE, CFMYRON.   Patient  states he is feeling well.  All of his arm wounds have healed.  His right groin wound is almost resolved, quite small with minimal drainage.  Left lower extremity wound measures smaller as well.    4/2/2025: Clinic visit with Noe Aguirre MD. Patient reports doing well, denies any signs or symptoms of infection. Left lower leg improving, however his edema is poorly controlled and he has not been wearing Tubigrips or compression socks per family. Counseled about the importance of edema management and his edema will not improve without compression. Patient's right groin wound has not improved, there is underlying induration without evidence of infection. Differential includes underlying hematoma or possible pseudoaneurysm, will order soft tissue US to evaluate suspected underlying pathology that is resulting in lack of wound healing.    REVIEW OF SYSTEMS:   Unchanged from previous wound clinic assessment on 3/28/2025, except as noted in interval history above      PHYSICAL EXAMINATION:   There were no vitals taken for this visit.    Physical Exam  Cardiovascular:      Pulses: Normal pulses.      Comments: Left DP and PT pulse palpable, +2  Pulmonary:      Effort: Pulmonary effort is normal.   Musculoskeletal:      Right lower leg: Edema present.      Left lower leg: Edema present.      Comments: Bilateral lower extremity edema, 3+ pitting   Skin:     Comments: Left anterior lower extremity skin tear:  Skin tear secondary to dog scratch.  Wound smaller, thin layer of slough that is improving. Moderate serosanguineous drainage, no odor.  No periwound erythema or induration.    Right groin: Insertion site cardiac pacemaker.  Wound appears stalled, near surface level, no evidence of infection. There is underlying induration and palpable pulse, question underlying hematoma or  pseudoaneurysm preventing wound healing.   Neurological:      General: No focal deficit present.      Mental Status: He is alert.         WOUND  ASSESSMENT  Wound 02/26/25 Traumatic Leg Lower Left -- Left anterolateral LE (Active)   Wound Image    04/02/25 1643   Site Assessment Red;Yellow 04/02/25 1643   Periwound Assessment Scar tissue;Edema;Fragile 04/02/25 1643   Margins Attached edges 04/02/25 1643   Drainage Amount Moderate 04/02/25 1643   Drainage Description Serosanguineous 04/02/25 1643   Treatments Cleansed;Topical Lidocaine;Provider debridement;Site care 04/02/25 1643   Wound Cleansing Hypochlorus Acid 04/02/25 1643   Periwound Protectant Skin Moisturizer 04/02/25 1643   Dressing Cleansing/Solutions Not Applicable 04/02/25 1643   Dressing Options Hydrofiber Silver;Silicone Adhesive Foam;Tubigrip 04/02/25 1643   Dressing Change/Treatment Frequency Twice Weekly 04/02/25 1643   Wound Team Following Weekly 03/28/25 1430   Non-staged Wound Description Full thickness 04/02/25 1643   Wound Length (cm) 0.7 cm 04/02/25 1643   Wound Width (cm) 2.2 cm 04/02/25 1643   Wound Depth (cm) 0.1 cm 04/02/25 1643   Wound Surface Area (cm^2) 1.54 cm^2 04/02/25 1643   Wound Volume (cm^3) 0.154 cm^3 04/02/25 1643   Post-Procedure Length (cm) 0.8 cm 04/02/25 1643   Post-Procedure Width (cm) 2.2 cm 04/02/25 1643   Post-Procedure Depth (cm) 0.1 cm 04/02/25 1643   Post-Procedure Surface Area (cm^2) 1.76 cm^2 04/02/25 1643   Post-Procedure Volume (cm^3) 0.176 cm^3 04/02/25 1643   Wound Healing % 81 04/02/25 1643   Tunneling (cm) 0 cm 04/02/25 1643   Undermining (cm) 0 cm 04/02/25 1643   Wound Odor None 04/02/25 1643   Pulses Left;2+;DP;PT 03/20/25 1500   Exposed Structures None 04/02/25 1643   Number of days: 36       Wound 03/10/25 Full Thickness Wound Groin Anterior Right Surgical incision with surrounding MARSI (Active)   Wound Image   04/02/25 1643   Site Assessment Red;Bleeding 04/02/25 1643   Periwound Assessment Fragile;Scar tissue 04/02/25 1643   Margins Attached edges 04/02/25 1643   Closure Sutures 03/20/25 1500   Drainage Amount Moderate 04/02/25 1643   Drainage  Description Sanguineous 04/02/25 1643   Treatments Cleansed;Site care 04/02/25 1643   Wound Cleansing Hypochlorus Acid 04/02/25 1643   Periwound Protectant Not Applicable 04/02/25 1643   Dressing Status Old drainage 03/20/25 1500   Dressing Changed Changed 03/28/25 1430   Dressing Cleansing/Solutions Not Applicable 04/02/25 1643   Dressing Options Hydrofiber Silver;Hydrocolloid Thick 04/02/25 1643   Dressing Change/Treatment Frequency Twice Weekly 04/02/25 1643   Wound Team Following Weekly 03/28/25 1430   Non-staged Wound Description Full thickness 04/02/25 1643   Wound Length (cm) 0.5 cm 04/02/25 1643   Wound Width (cm) 1 cm 04/02/25 1643   Wound Depth (cm) 0.1 cm 04/02/25 1643   Wound Surface Area (cm^2) 0.5 cm^2 04/02/25 1643   Wound Volume (cm^3) 0.05 cm^3 04/02/25 1643   Post-Procedure Length (cm) 0.6 cm 03/20/25 1500   Post-Procedure Width (cm) 1.7 cm 03/20/25 1500   Post-Procedure Depth (cm) 0.3 cm 03/20/25 1500   Post-Procedure Surface Area (cm^2) 1.02 cm^2 03/20/25 1500   Post-Procedure Volume (cm^3) 0.306 cm^3 03/20/25 1500   Wound Healing % 100 04/02/25 1643   Tunneling (cm) 0 cm 04/02/25 1643   Undermining (cm) 0 cm 04/02/25 1643   Wound Odor None 04/02/25 1643   Exposed Structures None 04/02/25 1643   Number of days: 24       PROCEDURE: Excisional debridement of  left anterior lower extremity wound  -2% viscous lidocaine applied topically to wound bed for approximately 5 minutes prior to debridement  -Curette used to debride wound bed.  Excisional debridement was performed to remove devitalized tissue until healthy, bleeding tissue was visualized.  Total area debrided was 1.76 cm². Tissue debrided into the subcutaneous layer.    -Bleeding controlled with manual pressure.    -Wound care completed by wound RN, refer to flowsheet  -Patient tolerated majority the procedure well, without c/o pain or discomfort.     No excisional debridement of right groin wound required today    Pertinent Labs and  Diagnostics:    Labs:     A1c:   Lab Results   Component Value Date/Time    HBA1C 5.9 (H) 01/03/2019 11:40 AM          IMAGING: see epic    VASCULAR STUDIES: see epic    LAST  WOUND CULTURE:  DATE :   Lab Results   Component Value Date/Time    CULTRSULT Rare growth usual skin gege. 01/17/2024 09:39 AM    CULTRSULT No fungal growth. 01/17/2024 09:39 AM         ASSESSMENT AND PLAN:     1. ISTAP type 2 skin tear of right forearm  2. ISTAP type 2 skin tear of right wrist  3. ISTAP type 2 skin tear of left lower leg  Ground-level fall causing skin tears on 2/8/2025 4/2/2025: All of these wounds are resolved except left lower extremity wound which is smaller  -Excisional debridement of wound in clinic today, medically necessary to promote wound healing.  -Patient to return to clinic weekly for assessment and debridement  -Patient to change dressing 1-2 times per week in between clinic visits     Wound care: Left lower leg- hydrofiber silver, foam cover dressing, Tubigrip    4. History of CVA (cerebrovascular accident)    4/2/2025: CVA on 11/1/2024.  Treated at [facility] discharged to [facility] rehab for 3 weeks.  Since then patient discharged with [facility] for physical therapy and speech therapy.  He continues with speech therapy  - He does have balance issues with residual left leg weakness.  Uses front wheeled walker.  - Physical therapy referral to reevaluate patient previously offered, patient declined  - Continue with wound care and speech therapy through home health.    5. Bilateral lower extremity edema    4/2/2025  - Patient with lower extremity edema, likely multifactorial including cardiac, dependent edema, and venous disease as evidenced by hemosiderin staining  - Does not wear compression consistently  -He has been counseled on importance of compression therapy to manage edema for wound healing and prevention of future wounds.  -Per wife and daughter, patient reluctant  to wear compression garments.  He has several different types. He was counseled again on the importance of edema management.    6.  Wound of right groin, subsequent encounter  7.  S/P placement of cardiac pacemaker    4/2/2025: Cardiac pacemaker placed by Dr. Santos at [facility] 3/3/2025.  - Wound has not resolved. It appears stalled without improvement.  - There is underlying induration and easily to palpate pulse. Differential includes hematoma or pseudoaneurysm. Will order US to evaluate for underlying pathology that may be preventing wound healing.  Wound care: Right groin-silver Hydrofiber, thick hydrocolloid    PATIENT EDUCATION  - Importance of adequate nutrition for wound healing  -Advised to go to ER for any increased redness, swelling, drainage, or odor, or if patient develops fever, chills, nausea or vomiting.     Please note that this note may have been created using voice recognition software. I have worked with technical experts from [facility] to optimize the interface.  I have made every reasonable attempt to correct obvious errors, but there may be errors of grammar and possibly content that I did not discover before finalizing the note.    N

## 2025-04-03 ENCOUNTER — TELEPHONE (OUTPATIENT)
Dept: WOUND CARE | Facility: MEDICAL CENTER | Age: 89
End: 2025-04-03
Payer: MEDICARE

## 2025-04-03 NOTE — TELEPHONE ENCOUNTER
Received a message from [facility] [employer] that they are showing the patient is currently receiving home health services. Per Dr. Aguirre, the patient and his wife stated in yesterday's visit that they are no longer being seen by home health. This RN called [facility] who confirmed that they are still seeing the patient and in fact, going out today. Home health orders updated and routed to [facility] via [employer]. Wound care supply order canceled with [facility]. This RN attempted to call patient and update him and his spouse however, they did not answer and I was unable to leave a message.

## 2025-04-09 ENCOUNTER — OFFICE VISIT (OUTPATIENT)
Dept: WOUND CARE | Facility: MEDICAL CENTER | Age: 89
End: 2025-04-09
Attending: NURSE PRACTITIONER
Payer: MEDICARE

## 2025-04-09 DIAGNOSIS — S51.811A ISTAP TYPE 2 SKIN TEAR OF RIGHT FOREARM: ICD-10-CM

## 2025-04-09 DIAGNOSIS — S31.109D WOUND OF RIGHT GROIN, SUBSEQUENT ENCOUNTER: ICD-10-CM

## 2025-04-09 DIAGNOSIS — R60.0 BILATERAL LOWER EXTREMITY EDEMA: ICD-10-CM

## 2025-04-09 DIAGNOSIS — S81.812A ISTAP TYPE 2 SKIN TEAR OF LEFT LOWER LEG: ICD-10-CM

## 2025-04-09 DIAGNOSIS — Z86.73 HISTORY OF CVA (CEREBROVASCULAR ACCIDENT): ICD-10-CM

## 2025-04-09 DIAGNOSIS — S61.511A: ICD-10-CM

## 2025-04-09 DIAGNOSIS — Z95.0 S/P PLACEMENT OF CARDIAC PACEMAKER: ICD-10-CM

## 2025-04-09 DIAGNOSIS — S31.109A WOUND OF RIGHT GROIN, INITIAL ENCOUNTER: ICD-10-CM

## 2025-04-09 PROCEDURE — 11042 DBRDMT SUBQ TIS 1ST 20SQCM/<: CPT | Performed by: STUDENT IN AN ORGANIZED HEALTH CARE EDUCATION/TRAINING PROGRAM

## 2025-04-09 PROCEDURE — 11042 DBRDMT SUBQ TIS 1ST 20SQCM/<: CPT

## 2025-04-09 NOTE — PATIENT INSTRUCTIONS
-Keep your wound dressing clean, dry, and intact. Only change dressing if it's over saturated, soiled or falls off.     -Change your dressing if it becomes soiled, soaked, or falls off.    - Resolved wound be fragile for a few days, bathe and dry area gently, only ever regains a maximum of 80% of the tensile strength of the surrounding skin, remodeling of scar can continue for 6 months to a year. Contact PCP for a referral back her if any problems with area opening and draining again.    -Should you experience any significant changes in your wound(s), such as infection (redness, swelling, localized heat, increased pain, fever > 101 F, chills) or have any questions regarding your home care instructions, please contact the wound center at [phone]. If after hours, contact your primary care physician or go to the hospital emergency room.     If you are admitted to any hospital, you will need a new referral to come back to the wound clinic and any scheduled appointments that you already have, may be cancelled.   Pulses equal bilaterally, no edema present.

## 2025-04-09 NOTE — PROGRESS NOTES
Updated wound care orders sent via [employer] to [facility].     Wife and daughter given phone number to Imaging Department and instructed to call to schedule Ultrasound to groin.

## 2025-04-11 NOTE — PROGRESS NOTES
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  He does suffer from balance issues.  Uses front wheel walker.  Once discharged from [facility] patient has had home health through [facility] for physical therapy and speech.  He continues to see speech therapy.  On 2/8/2025, patient was bending over putting dishes into the  and fell which caused multiple skin tears to his left knee, right forearm and wrist.  EMS was called.  He was taken to [facility] ED for evaluation of head injury as patient did strike his head while on Eliquis.  Laceration to his right wrist was reapproximated and sutured.  The other laceration to wrist was closed with Dermabond and Steri-Strips.  His workup was negative and discharged.  Was referred to [facility] wound care clinic for further treatment and care.  On 2/10/2025, patient was seen at [facility] ED for wound assessment and dressing change.  Patient was referred to home health.        Pertinent Medical History: CVA with left leg weakness-on Eliquis    TOBACCO USE:  no   Alcohol drink daily    Patient's problem list, allergies, and current medications reviewed and updated in Epic    Interval History:  2/18/25: Initial wound evaluation, initial provider Clinic visit with Eli BENAVIDES, GINNY-BC, CWON, CFMYRON.  Pt denies fevers, chills, nausea, vomiting.  Patient accompanied by wife, Pat.  Patient followed up with PCP office yesterday and sutures were removed to the wrist yesterday.  Patient complains of generalized soreness from previous fall.  Updated order sent to  home health.  Patient does have BLE edema.  Previously given DAVID hose and wife purchased OTC zipper compression socks to control his edema however patient declined to wear.  Discussed use of controlling edema for improved wound healing.  Offered Tubigrip's.  Patient declined.  Medication list updated.    2/26/2025: Clinic visit with Noe Aguirre MD. Patient reports feeling in normal state of health. Accompanied by wife. He has new skin tear of left lower extremity, reports dog jumped off lap and caused new skin tear. Patient noted that he has drainage form left lower extremity wound that saturated the bed. Discussed that his chronic lower extremity edema is causing wound to drain heavily and will prevent wound from healing. He has been resistant to compression but agreeable to try tubigrip this week. Reinforced importance of compression and demonstrated various compression garments. He will think about if he will be willing to wear compression in the future.    3/10/25: Clinic visit with Eli BENAVIDES, MELECIO, DALTON, CFMYRON.  Pt denies fevers, chills, nausea, vomiting.  Accompanied by wife and daughter.  Intermittently using Tubigrip.  - Had pacemaker placed by Dr. Santos at [facility] last week.  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  Right wrist, left knee and right forearm skin tear improved.  Left anterior lower leg ulcer with thick slough, although area improved.    3/20/2025 : Clinic visit with MAKAYLA De Anda, MELECIO, ROCHELLE, CFMYRON.   Patient is here today with his wife and daughter.  He states he is feeling well over all.  His arm wounds are essentially resolved, present with dry flaking skin.  Left lower leg wound measures about the same, thick slough to wound bed.  Right groin wound also has loose slough.    3/28/2025 : Clinic visit with MAKAYLA eD Anda, MELECIO, ROCHELLE, CFMYRON.   Patient  states he is feeling well.  All of his arm wounds have healed.  His right groin wound is almost resolved, quite small with minimal drainage.  Left lower extremity wound measures smaller as well.    4/2/2025: Clinic visit with Noe Aguirre MD. Patient reports doing well, denies any signs or symptoms of infection. Left lower leg improving, however his edema is poorly controlled and he has not been wearing Tubigrips or compression socks per family. Counseled about the importance of edema management and his edema will not improve without compression. Patient's right groin wound has not improved, there is underlying induration without evidence of infection. Differential includes underlying hematoma or possible pseudoaneurysm, will order soft tissue US to evaluate suspected underlying pathology that is resulting in lack of wound healing.    4/9/2025: Clinic visit with Noe Aguirre MD. Patient accompanied by wife and daughter. Denies any acute issues. Wearing Tubigrip, left leg wound smaller. His right groin the same. Has not scheduled US.    REVIEW OF SYSTEMS:   Unchanged from previous wound clinic assessment on 4/2/2025, except as noted in interval history above      PHYSICAL EXAMINATION:   There were no vitals taken for this visit.    Physical Exam  Cardiovascular:      Pulses: Normal pulses.      Comments: Left DP and PT pulse palpable, +2  Pulmonary:      Effort: Pulmonary effort is normal.   Musculoskeletal:      Right lower leg: Edema present.      Left lower leg: Edema present.      Comments: Bilateral lower extremity edema, 3+ pitting   Skin:     Comments: Left anterior lower extremity skin tear: Wound smaller, improving. Thin crust and slough. No evidence of infection.    Right groin: Insertion site cardiac pacemaker.  Wound remains stalled, near surface level, no evidence of infection. There is underlying induration and palpable pulse, question underlying hematoma or  pseudoaneurysm preventing wound  healing.   Neurological:      General: No focal deficit present.      Mental Status: He is alert.         WOUND ASSESSMENT  Wound 02/26/25 Traumatic Leg Lower Left -- Left anterolateral LE (Active)   Wound Image    04/09/25 1600   Site Assessment Brown;Yellow;Scabbed 04/09/25 1600   Periwound Assessment Scar tissue;Edema 04/09/25 1600   Margins Attached edges 04/09/25 1600   Drainage Amount Scant 04/09/25 1600   Drainage Description Serous 04/09/25 1600   Treatments Cleansed;Topical Lidocaine;Provider debridement 04/09/25 1600   Wound Cleansing Hypochlorus Acid 04/09/25 1600   Periwound Protectant Skin Moisturizer 04/09/25 1600   Dressing Cleansing/Solutions Not Applicable 04/09/25 1600   Dressing Options Silicone Adhesive Foam;Tubigrip 04/09/25 1600   Dressing Change/Treatment Frequency Twice Weekly 04/09/25 1600   Wound Team Following Weekly 03/28/25 1430   Non-staged Wound Description Full thickness 04/09/25 1600   Wound Length (cm) 0.7 cm 04/02/25 1643   Wound Width (cm) 2.2 cm 04/02/25 1643   Wound Depth (cm) 0.1 cm 04/02/25 1643   Wound Surface Area (cm^2) 1.54 cm^2 04/02/25 1643   Wound Volume (cm^3) 0.154 cm^3 04/02/25 1643   Post-Procedure Length (cm) 0.2 cm 04/09/25 1600   Post-Procedure Width (cm) 0.6 cm 04/09/25 1600   Post-Procedure Depth (cm) 0.1 cm 04/09/25 1600   Post-Procedure Surface Area (cm^2) 0.12 cm^2 04/09/25 1600   Post-Procedure Volume (cm^3) 0.012 cm^3 04/09/25 1600   Wound Healing % 81 04/02/25 1643   Tunneling (cm) 0 cm 04/09/25 1600   Undermining (cm) 0 cm 04/09/25 1600   Wound Odor None 04/09/25 1600   Pulses Left;2+;DP;PT 03/20/25 1500   Exposed Structures None 04/09/25 1600   Number of days: 44       Wound 03/10/25 Full Thickness Wound Groin Anterior Right Surgical incision with surrounding MARSI (Active)   Wound Image   04/09/25 1600   Site Assessment Red;Granulation tissue 04/09/25 1600   Periwound Assessment Intact;Fragile 04/09/25 1600   Margins Attached edges 04/09/25 1600    Closure Sutures 03/20/25 1500   Drainage Amount Small 04/09/25 1600   Drainage Description Serosanguineous 04/09/25 1600   Treatments Cleansed;Site care 04/09/25 1600   Wound Cleansing Hypochlorus Acid 04/09/25 1600   Periwound Protectant Not Applicable 04/02/25 1643   Dressing Status Old drainage 03/20/25 1500   Dressing Changed Changed 03/28/25 1430   Dressing Cleansing/Solutions Not Applicable 04/09/25 1600   Dressing Options Hydrofera Blue Ready;Silicone Adhesive Foam 04/09/25 1600   Dressing Change/Treatment Frequency Twice Weekly 04/09/25 1600   Wound Team Following Weekly 03/28/25 1430   Non-staged Wound Description Full thickness 04/09/25 1600   Wound Length (cm) 0.5 cm 04/09/25 1600   Wound Width (cm) 1 cm 04/09/25 1600   Wound Depth (cm) 0.1 cm 04/09/25 1600   Wound Surface Area (cm^2) 0.5 cm^2 04/09/25 1600   Wound Volume (cm^3) 0.05 cm^3 04/09/25 1600   Post-Procedure Length (cm) 0.6 cm 03/20/25 1500   Post-Procedure Width (cm) 1.7 cm 03/20/25 1500   Post-Procedure Depth (cm) 0.3 cm 03/20/25 1500   Post-Procedure Surface Area (cm^2) 1.02 cm^2 03/20/25 1500   Post-Procedure Volume (cm^3) 0.306 cm^3 03/20/25 1500   Wound Healing % 100 04/09/25 1600   Tunneling (cm) 0 cm 04/09/25 1600   Undermining (cm) 0 cm 04/09/25 1600   Wound Odor None 04/09/25 1600   Exposed Structures None 04/09/25 1600   Number of days: 32       PROCEDURE: Excisional debridement of left anterior lower extremity wound  -2% viscous lidocaine applied topically to wound bed for approximately 5 minutes prior to debridement  -Curette used to debride wound bed.  Excisional debridement was performed to remove devitalized tissue until healthy, bleeding tissue was visualized.  Total area debrided was 0.12 cm². Tissue debrided into the subcutaneous layer.    -Bleeding controlled with manual pressure.    -Wound care completed by wound RN, refer to flowsheet  -Patient tolerated majority the procedure well, without c/o pain or discomfort.     No  excisional debridement of right groin wound required today    Pertinent Labs and Diagnostics:    Labs:     A1c:   Lab Results   Component Value Date/Time    HBA1C 5.9 (H) 01/03/2019 11:40 AM          IMAGING: see epic    VASCULAR STUDIES: see epic    LAST  WOUND CULTURE:  DATE :   Lab Results   Component Value Date/Time    CULTRSULT Rare growth usual skin gege. 01/17/2024 09:39 AM    CULTRSULT No fungal growth. 01/17/2024 09:39 AM         ASSESSMENT AND PLAN:     1. ISTAP type 2 skin tear of right forearm  2. ISTAP type 2 skin tear of right wrist  3. ISTAP type 2 skin tear of left lower leg  Ground-level fall causing skin tears on 2/8/2025 4/9/2025: Left lower extremity wound smaller  -Excisional debridement of wound in clinic today, medically necessary to promote wound healing.  -Patient to return to clinic weekly for assessment and debridement  -Patient to change dressing 1-2 times per week in between clinic visits     Wound care: Left lower leg- Silicone adhesive foam, Tubigrip    4. History of CVA (cerebrovascular accident)    4/9/2025: CVA on 11/1/2024.  Treated at [facility] discharged to [facility] rehab for 3 weeks.  Since then patient discharged with [facility] for physical therapy and speech therapy.  He continues with speech therapy  - He does have balance issues with residual left leg weakness.  Uses front wheeled walker.  - Physical therapy referral to reevaluate patient previously offered, patient declined  - Continue with wound care and speech therapy through home health.    5. Bilateral lower extremity edema    4/9/2025  - Patient with lower extremity edema, likely multifactorial including cardiac, dependent edema, and venous disease as evidenced by hemosiderin staining  - Does not wear compression consistently  -He has been counseled on importance of compression therapy to manage edema for wound healing and prevention of future wounds.  -Per wife and daughter,  patient reluctant to wear compression garments.  He has several different types. He was counseled again on the importance of edema management.    6.  Wound of right groin, subsequent encounter  7.  S/P placement of cardiac pacemaker    4/9/2025: Cardiac pacemaker placed by Dr. Santos at [facility] 3/3/2025.  - Wound has not resolved. It appears stalled without improvement.  - There is underlying induration and easily to palpate pulse. Differential includes hematoma or pseudoaneurysm. Ordered US to evaluate for underlying pathology that may be preventing wound healing, has not scheduled yet  Wound care: Right groin- HFBR, foam cover dressing    PATIENT EDUCATION  - Importance of adequate nutrition for wound healing  -Advised to go to ER for any increased redness, swelling, drainage, or odor, or if patient develops fever, chills, nausea or vomiting.     Please note that this note may have been created using voice recognition software. I have worked with technical experts from [employer] to optimize the interface.  I have made every reasonable attempt to correct obvious errors, but there may be errors of grammar and possibly content that I did not discover before finalizing the note.    N

## 2025-04-16 ENCOUNTER — OFFICE VISIT (OUTPATIENT)
Dept: WOUND CARE | Facility: MEDICAL CENTER | Age: 89
End: 2025-04-16
Attending: NURSE PRACTITIONER
Payer: MEDICARE

## 2025-04-16 DIAGNOSIS — S31.109D WOUND OF RIGHT GROIN, SUBSEQUENT ENCOUNTER: ICD-10-CM

## 2025-04-16 DIAGNOSIS — S51.811A ISTAP TYPE 2 SKIN TEAR OF RIGHT FOREARM: ICD-10-CM

## 2025-04-16 DIAGNOSIS — Z95.0 S/P PLACEMENT OF CARDIAC PACEMAKER: ICD-10-CM

## 2025-04-16 DIAGNOSIS — S81.812A ISTAP TYPE 2 SKIN TEAR OF LEFT LOWER LEG: ICD-10-CM

## 2025-04-16 DIAGNOSIS — R60.0 BILATERAL LOWER EXTREMITY EDEMA: ICD-10-CM

## 2025-04-16 DIAGNOSIS — S31.109A WOUND OF RIGHT GROIN, INITIAL ENCOUNTER: ICD-10-CM

## 2025-04-16 DIAGNOSIS — Z86.73 HISTORY OF CVA (CEREBROVASCULAR ACCIDENT): ICD-10-CM

## 2025-04-16 DIAGNOSIS — S61.511A: ICD-10-CM

## 2025-04-16 PROCEDURE — 99213 OFFICE O/P EST LOW 20 MIN: CPT

## 2025-04-16 PROCEDURE — 99213 OFFICE O/P EST LOW 20 MIN: CPT | Performed by: STUDENT IN AN ORGANIZED HEALTH CARE EDUCATION/TRAINING PROGRAM

## 2025-04-16 NOTE — PROGRESS NOTES
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  He does suffer from balance issues.  Uses front wheel walker.  Once discharged from [facility] patient has had home health through [facility] for physical therapy and speech.  He continues to see speech therapy.  On 2/8/2025, patient was bending over putting dishes into the  and fell which caused multiple skin tears to his left knee, right forearm and wrist.  EMS was called.  He was taken to [facility] ED for evaluation of head injury as patient did strike his head while on Eliquis.  Laceration to his right wrist was reapproximated and sutured.  The other laceration to wrist was closed with Dermabond and Steri-Strips.  His workup was negative and discharged.  Was referred to [facility] wound care clinic for further treatment and care.  On 2/10/2025, patient was seen at [facility] ED for wound assessment and dressing change.  Patient was referred to home health.        Pertinent Medical History: CVA with left leg weakness-on Eliquis    TOBACCO USE:  no   Alcohol drink daily    Patient's problem list, allergies, and current medications reviewed and updated in Epic    Interval History:  2/18/25: Initial wound evaluation, initial provider Clinic visit with Eli BENAVIDES, GINNY-BC, CWON, CFMYRON.  Pt denies fevers, chills, nausea, vomiting.  Patient accompanied by wife, Pat.  Patient followed up with PCP office yesterday and sutures were removed to the wrist yesterday.  Patient complains of generalized soreness from previous fall.  Updated order sent to  home health.  Patient does have BLE edema.  Previously given DAVID hose and wife purchased OTC zipper compression socks to control his edema however patient declined to wear.  Discussed use of controlling edema for improved wound healing.  Offered Tubigrip's.  Patient declined.  Medication list updated.    2/26/2025: Clinic visit with Noe Aguirre MD. Patient reports feeling in normal state of health. Accompanied by wife. He has new skin tear of left lower extremity, reports dog jumped off lap and caused new skin tear. Patient noted that he has drainage form left lower extremity wound that saturated the bed. Discussed that his chronic lower extremity edema is causing wound to drain heavily and will prevent wound from healing. He has been resistant to compression but agreeable to try tubigrip this week. Reinforced importance of compression and demonstrated various compression garments. He will think about if he will be willing to wear compression in the future.    3/10/25: Clinic visit with Eli BENAVIDES, MELECIO, DALTON, CFMYRON.  Pt denies fevers, chills, nausea, vomiting.  Accompanied by wife and daughter.  Intermittently using Tubigrip.  - Had pacemaker placed by Dr. Santos at [facility] last week.  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  Right wrist, left knee and right forearm skin tear improved.  Left anterior lower leg ulcer with thick slough, although area improved.    3/20/2025 : Clinic visit with MAKAYLA De Anda, MELECIO, ROCHELLE, CFMYRON.   Patient is here today with his wife and daughter.  He states he is feeling well over all.  His arm wounds are essentially resolved, present with dry flaking skin.  Left lower leg wound measures about the same, thick slough to wound bed.  Right groin wound also has loose slough.    3/28/2025 : Clinic visit with MAKAYLA De Anda, MELECIO, ROCHELLE, CFMYRON.   Patient  states he is feeling well.  All of his arm wounds have healed.  His right groin wound is almost resolved, quite small with minimal drainage.  Left lower extremity wound measures smaller as well.    4/2/2025: Clinic visit with Noe Aguirre MD. Patient reports doing well, denies any signs or symptoms of infection. Left lower leg improving, however his edema is poorly controlled and he has not been wearing Tubigrips or compression socks per family. Counseled about the importance of edema management and his edema will not improve without compression. Patient's right groin wound has not improved, there is underlying induration without evidence of infection. Differential includes underlying hematoma or possible pseudoaneurysm, will order soft tissue US to evaluate suspected underlying pathology that is resulting in lack of wound healing.    4/9/2025: Clinic visit with Noe Aguirre MD. Patient accompanied by wife and daughter. Denies any acute issues. Wearing Tubigrip, left leg wound smaller. His right groin the same. Has not scheduled US.    4/16/2025: Clinic visit with Noe Aguirre MD. Patient is accompanied by wife and daughter. He reports feeling in normal state of health. Right groin wound smaller. He questions whether or not he should have US of groin. He continues to have some underlying induration with palpable thrill concerning for pseudoaneurysm. He has appointment with cardiology on Friday and recommend he discuss with them, but I think further workup is indicated. Patient left leg wound resolved, however two new superficial wounds due to tape trauma.    REVIEW OF SYSTEMS:   Unchanged from previous wound clinic assessment on 4/9/2025, except as noted in interval history above    PHYSICAL EXAMINATION:   There were no vitals taken for this visit.    Physical Exam  Cardiovascular:      Pulses: Normal pulses.      Comments: Left DP and PT pulse palpable, +2  Pulmonary:      Effort: Pulmonary effort is  normal.   Musculoskeletal:      Right lower leg: Edema present.      Left lower leg: Edema present.      Comments: Bilateral lower extremity edema, 3+ pitting   Skin:     Comments: Left anterior lower extremity skin tear: Wound resolved, however two new small superficial satellite wounds due to tape trauma.    Right groin: Insertion site cardiac pacemaker. Wound smaller. Continues to have palpable thrill concerning for pseudoaneurysm.   Neurological:      General: No focal deficit present.      Mental Status: He is alert.         WOUND ASSESSMENT  Wound 03/10/25 Full Thickness Wound Groin Anterior Right Surgical incision with surrounding MARSI (Active)   Wound Image   04/16/25 1600   Site Assessment Red;Granulation tissue 04/16/25 1600   Periwound Assessment Intact;Fragile 04/16/25 1600   Margins Attached edges 04/16/25 1600   Closure Secondary intention 04/16/25 1600   Drainage Amount Small 04/16/25 1600   Drainage Description Serosanguineous 04/16/25 1600   Treatments Cleansed 04/16/25 1600   Wound Cleansing Hypochlorus Acid 04/16/25 1600   Periwound Protectant No-sting Skin Prep 04/16/25 1600   Dressing Status Old drainage 03/20/25 1500   Dressing Changed Changed 04/16/25 1600   Dressing Cleansing/Solutions Not Applicable 04/16/25 1600   Dressing Options Hydrofera Blue Ready;Silicone Adhesive Foam 04/16/25 1600   Dressing Change/Treatment Frequency Twice Weekly 04/16/25 1600   Wound Team Following Weekly 04/16/25 1600   Non-staged Wound Description Full thickness 04/16/25 1600   Wound Length (cm) 0.2 cm 04/16/25 1600   Wound Width (cm) 0.5 cm 04/16/25 1600   Wound Depth (cm) 0.1 cm 04/16/25 1600   Wound Surface Area (cm^2) 0.1 cm^2 04/16/25 1600   Wound Volume (cm^3) 0.01 cm^3 04/16/25 1600   Post-Procedure Length (cm) 0.6 cm 03/20/25 1500   Post-Procedure Width (cm) 1.7 cm 03/20/25 1500   Post-Procedure Depth (cm) 0.3 cm 03/20/25 1500   Post-Procedure Surface Area (cm^2) 1.02 cm^2 03/20/25 1500   Post-Procedure  Volume (cm^3) 0.306 cm^3 03/20/25 1500   Wound Healing % 100 04/16/25 1600   Tunneling (cm) 0 cm 04/16/25 1600   Undermining (cm) 0 cm 04/16/25 1600   Wound Odor None 04/16/25 1600   Exposed Structures None 04/16/25 1600   Number of days: 38       Wound 04/16/25 Traumatic Leg Lower Left DISTAL CLUSTER (Active)   Wound Image   04/16/25 1600   Site Assessment Red 04/16/25 1600   Periwound Assessment Intact;Edema 04/16/25 1600   Margins Attached edges 04/16/25 1600   Closure Secondary intention 04/16/25 1600   Drainage Amount Moderate 04/16/25 1600   Drainage Description Serous 04/16/25 1600   Treatments Cleansed 04/16/25 1600   Wound Cleansing Hypochlorus Acid 04/16/25 1600   Periwound Protectant No-sting Skin Prep 04/16/25 1600   Dressing Changed New 04/16/25 1600   Dressing Cleansing/Solutions Not Applicable 04/16/25 1600   Dressing Options Adaptic;Dry Roll Gauze;Hypafix Tape;Tubigrip;Other (Comments) 04/16/25 1600   Dressing Change/Treatment Frequency Twice Weekly 04/16/25 1600   Wound Team Following Weekly 04/16/25 1600   Non-staged Wound Description Full thickness 04/16/25 1600   Wound Length (cm) 0.5 cm 04/16/25 1600   Wound Width (cm) 7 cm 04/16/25 1600   Wound Depth (cm) 0.1 cm 04/16/25 1600   Wound Surface Area (cm^2) 3.5 cm^2 04/16/25 1600   Wound Volume (cm^3) 0.35 cm^3 04/16/25 1600   Tunneling (cm) 0 cm 04/16/25 1600   Undermining (cm) 0 cm 04/16/25 1600   Wound Odor None 04/16/25 1600   Exposed Structures None 04/16/25 1600   Number of days: 1       PROCEDURE:   - No excisional debridement required today.    Pertinent Labs and Diagnostics:    Labs:     A1c:   Lab Results   Component Value Date/Time    HBA1C 5.9 (H) 01/03/2019 11:40 AM          IMAGING: see epic    VASCULAR STUDIES: see epic    LAST  WOUND CULTURE:  DATE :   Lab Results   Component Value Date/Time    CULTRSULT Rare growth usual skin gege. 01/17/2024 09:39 AM    CULTRSULT No fungal growth. 01/17/2024 09:39 AM         ASSESSMENT AND PLAN:      1. ISTAP type 2 skin tear of right forearm  2. ISTAP type 2 skin tear of right wrist  3. ISTAP type 2 skin tear of left lower leg  Ground-level fall causing skin tears on 2/8/2025 4/16/2025: LLE wound has resolved. Unfortunately 2 new satellite wounds secondary to tape trauma.  - No debridement required  -Patient to return to clinic weekly for assessment and debridement  -Patient to change dressing 1-2 times per week in between clinic visits     Wound care: Adaptic, dry roll gauze, tape, tubigrip    4. History of CVA (cerebrovascular accident)    4/16/2025: CVA on 11/1/2024.  Treated at [facility] discharged to [facility] rehab for 3 weeks.  Since then patient discharged with [facility] for physical therapy and speech therapy.  He continues with speech therapy  - He does have balance issues with residual left leg weakness.  Uses front wheeled walker.  - Physical therapy referral to reevaluate patient previously offered, patient declined  - Continue with wound care and speech therapy through [facility].    5. Bilateral lower extremity edema    4/16/2025  - Patient with lower extremity edema, likely multifactorial including cardiac, dependent edema, and venous disease as evidenced by hemosiderin staining  - Does not wear compression consistently  -He has been counseled on importance of compression therapy to manage edema for wound healing and prevention of future wounds.  -Per wife and daughter, patient reluctant to wear compression garments.  He has several different types. He was counseled again on the importance of edema management.    6.  Wound of right groin, subsequent encounter  7.  S/P placement of cardiac pacemaker    4/16/2025: Cardiac pacemaker placed by Dr. Santos at [facility] 3/3/2025.  - Wound measuring smaller today  - No need for debridement  - Induration has decreased, but has significant palpable thrill concerning for  pseudoaneurysm.  - Patient has appointment with interventional cardiology later this week. Recommend he discuss with them. I have ordered soft tissue US to further evaluate.  Wound care: Right groin- HFBR, foam cover dressing    PATIENT EDUCATION  - Importance of adequate nutrition for wound healing  -Advised to go to ER for any increased redness, swelling, drainage, or odor, or if patient develops fever, chills, nausea or vomiting.     My total time spent caring for the patient on the day of the encounter was 20 minutes, reviewing history, assessment, counseling and education, and coordination of care.  This does not include time spent on separately billable procedures/tests.      Please note that this note may have been created using voice recognition software. I have worked with technical experts from [facility] to optimize the interface.  I have made every reasonable attempt to correct obvious errors, but there may be errors of grammar and possibly content that I did not discover before finalizing the note.    N

## 2025-04-17 NOTE — PATIENT INSTRUCTIONS
-Keep your wound dressing clean, dry, and intact.     -Change your dressing if it becomes soiled, soaked, or falls off.    -If you need to change your dressings at home: Wash your wound with normal saline, wound cleanser, or unscented soap and water prior to applying your new dressings. Please avoid cleansing with hydrogen peroxide or rubbing alcohol. Hydrogen peroxide and rubbing alcohol are toxic to new tissue and skin cells.    -Avoid prolonged standing or sitting without elevating your legs.    -Should you experience any significant changes in your wound(s), such as signs of infection (increasing redness, swelling, localized heat, increased pain, fever > 101 F, chills) or have any questions regarding your home care instructions, please contact the wound center at [phone]. If after hours, contact your primary care physician or go to the hospital emergency room.     If you are admitted to any hospital, you will need a new referral to come back to the wound clinic and any scheduled appointments that you already have may be cancelled.     -If you are 5 or more minutes late for an appointment, we reserve the right to cancel and reschedule that appointment. Additionally, if you are habitually late or not attending appts (3 late cancellations and/or no shows), we reserve the right to cancel your remaining appointments and it will be your responsibility to obtain a new referral if services are still needed.

## 2025-04-23 ENCOUNTER — OFFICE VISIT (OUTPATIENT)
Dept: WOUND CARE | Facility: MEDICAL CENTER | Age: 89
End: 2025-04-23
Attending: NURSE PRACTITIONER
Payer: MEDICARE

## 2025-04-23 DIAGNOSIS — R60.0 BILATERAL LOWER EXTREMITY EDEMA: ICD-10-CM

## 2025-04-23 DIAGNOSIS — S61.511A: ICD-10-CM

## 2025-04-23 DIAGNOSIS — Z86.73 HISTORY OF CVA (CEREBROVASCULAR ACCIDENT): ICD-10-CM

## 2025-04-23 DIAGNOSIS — S31.109D WOUND OF RIGHT GROIN, SUBSEQUENT ENCOUNTER: ICD-10-CM

## 2025-04-23 DIAGNOSIS — S81.812A ISTAP TYPE 2 SKIN TEAR OF LEFT LOWER LEG: ICD-10-CM

## 2025-04-23 DIAGNOSIS — S51.811A ISTAP TYPE 2 SKIN TEAR OF RIGHT FOREARM: ICD-10-CM

## 2025-04-23 PROCEDURE — 99213 OFFICE O/P EST LOW 20 MIN: CPT | Performed by: STUDENT IN AN ORGANIZED HEALTH CARE EDUCATION/TRAINING PROGRAM

## 2025-04-23 PROCEDURE — 99213 OFFICE O/P EST LOW 20 MIN: CPT

## 2025-04-23 NOTE — PROGRESS NOTES
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  He does suffer from balance issues.  Uses front wheel walker.  Once discharged from [facility] patient has had home health through [facility] for physical therapy and speech.  He continues to see speech therapy.  On 2/8/2025, patient was bending over putting dishes into the  and fell which caused multiple skin tears to his left knee, right forearm and wrist.  EMS was called.  He was taken to [facility] ED for evaluation of head injury as patient did strike his head while on Eliquis.  Laceration to his right wrist was reapproximated and sutured.  The other laceration to wrist was closed with Dermabond and Steri-Strips.  His workup was negative and discharged.  Was referred to [facility] wound care clinic for further treatment and care.  On 2/10/2025, patient was seen at [facility] ED for wound assessment and dressing change.  Patient was referred to home health.        Pertinent Medical History: CVA with left leg weakness-on Eliquis    TOBACCO USE:  no   Alcohol drink daily    Patient's problem list, allergies, and current medications reviewed and updated in Epic    Interval History:  2/18/25: Initial wound evaluation, initial provider Clinic visit with Eli BENAVIDES, GINNY-BC, CWON, CFMYRON.  Pt denies fevers, chills, nausea, vomiting.  Patient accompanied by wife, Pat.  Patient followed up with PCP office yesterday and sutures were removed to the wrist yesterday.  Patient complains of generalized soreness from previous fall.  Updated order sent to  home health.  Patient does have BLE edema.  Previously given DAVID hose and wife purchased OTC zipper compression socks to control his edema however patient declined to wear.  Discussed use of controlling edema for improved wound healing.  Offered Tubigrip's.  Patient declined.  Medication list updated.    2/26/2025: Clinic visit with Noe Aguirre MD. Patient reports feeling in normal state of health. Accompanied by wife. He has new skin tear of left lower extremity, reports dog jumped off lap and caused new skin tear. Patient noted that he has drainage form left lower extremity wound that saturated the bed. Discussed that his chronic lower extremity edema is causing wound to drain heavily and will prevent wound from healing. He has been resistant to compression but agreeable to try tubigrip this week. Reinforced importance of compression and demonstrated various compression garments. He will think about if he will be willing to wear compression in the future.    3/10/25: Clinic visit with Eli BENAVIDES, MELECIO, DALTON, CFMYRON.  Pt denies fevers, chills, nausea, vomiting.  Accompanied by wife and daughter.  Intermittently using Tubigrip.  - Had pacemaker placed by Dr. Santos at [facility] last week.  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  Right wrist, left knee and right forearm skin tear improved.  Left anterior lower leg ulcer with thick slough, although area improved.    3/20/2025 : Clinic visit with MAKAYLA De Anda, MELECIO, ROCHELLE, CFMYRNO.   Patient is here today with his wife and daughter.  He states he is feeling well over all.  His arm wounds are essentially resolved, present with dry flaking skin.  Left lower leg wound measures about the same, thick slough to wound bed.  Right groin wound also has loose slough.    3/28/2025 : Clinic visit with MAKAYLA De Anda, MELECIO, ROCHELLE, CFMYRON.   Patient  states he is feeling well.  All of his arm wounds have healed.  His right groin wound is almost resolved, quite small with minimal drainage.  Left lower extremity wound measures smaller as well.    4/2/2025: Clinic visit with Noe Aguirre MD. Patient reports doing well, denies any signs or symptoms of infection. Left lower leg improving, however his edema is poorly controlled and he has not been wearing Tubigrips or compression socks per family. Counseled about the importance of edema management and his edema will not improve without compression. Patient's right groin wound has not improved, there is underlying induration without evidence of infection. Differential includes underlying hematoma or possible pseudoaneurysm, will order soft tissue US to evaluate suspected underlying pathology that is resulting in lack of wound healing.    4/9/2025: Clinic visit with Noe Aguirre MD. Patient accompanied by wife and daughter. Denies any acute issues. Wearing Tubigrip, left leg wound smaller. His right groin the same. Has not scheduled US.    4/16/2025: Clinic visit with Noe Aguirre MD. Patient is accompanied by wife and daughter. He reports feeling in normal state of health. Right groin wound smaller. He questions whether or not he should have US of groin. He continues to have some underlying induration with palpable thrill concerning for pseudoaneurysm. He has appointment with cardiology on Friday and recommend he discuss with them, but I think further workup is indicated. Patient left leg wound resolved, however two new superficial wounds due to tape trauma.    4/23/2025: Clinic visit with Noe Aguirre MD. Patient reports doing well, denies any complaints. Wounds have resolved. He is scheduled for US right groin tomorrow for suspected  pseudoaneurysm. Patient will be discharged from clinic and I will follow up with patient regarding results.    REVIEW OF SYSTEMS:   Unchanged from previous wound clinic  assessment on 4/16/2025, except as noted in interval history above    PHYSICAL EXAMINATION:   There were no vitals taken for this visit.    Physical Exam  Cardiovascular:      Pulses: Normal pulses.      Comments: Left DP and PT pulse palpable, +2    Right femoral palpable thrill  Pulmonary:      Effort: Pulmonary effort is normal.   Musculoskeletal:      Right lower leg: Edema present.      Left lower leg: Edema present.      Comments: Bilateral lower extremity edema, 3+ pitting   Skin:     Comments: Left anterior lower extremity skin tear: Wounds have all resolved    Right groin: Wound resolved   Neurological:      General: No focal deficit present.      Mental Status: He is alert.         WOUND ASSESSMENT               PROCEDURE:   - No excisional debridement required today.    Pertinent Labs and Diagnostics:    Labs:     A1c:   Lab Results   Component Value Date/Time    HBA1C 5.9 (H) 01/03/2019 11:40 AM          IMAGING: see epic    VASCULAR STUDIES: see epic    LAST  WOUND CULTURE:  DATE :   Lab Results   Component Value Date/Time    CULTRSULT Rare growth usual skin gege. 01/17/2024 09:39 AM    CULTRSULT No fungal growth. 01/17/2024 09:39 AM         ASSESSMENT AND PLAN:     1. ISTAP type 2 skin tear of right forearm  2. ISTAP type 2 skin tear of right wrist  3. ISTAP type 2 skin tear of left lower leg  Ground-level fall causing skin tears on 2/8/2025 4/23/2025: LLE wounds have resolved  - No debridement required  - Recommend moisturizing lower extremities  - Recommend compression socks to manage edema and help maintain newly healed skin. He is resistant to compression.  - As wounds have resolved, will discharge from clinic.    4. History of CVA (cerebrovascular accident)    4/23/2025: CVA on 11/1/2024.  Treated at [facility] discharged to [facility] rehab for 3 weeks.  Since then patient discharged with [facility] for physical therapy and speech therapy.  He continues with  speech therapy  - He does have balance issues with residual left leg weakness.  Uses front wheeled walker.  - Physical therapy referral to reevaluate patient previously offered, patient declined  - Continue with wound care and speech therapy through home health.    5. Bilateral lower extremity edema    4/23/2025  - Patient with lower extremity edema, likely multifactorial including cardiac, dependent edema, and venous disease as evidenced by hemosiderin staining  - Edema has been better controlled with tubigrip.  - Patient has multiple types of compression socks at home, he is resistant to wearing.  -He has been counseled on importance of compression therapy to manage edema for wound healing and prevention of future wounds.    6.  Wound of right groin, subsequent encounter  7.  S/P placement of cardiac pacemaker    4/23/2025: Cardiac pacemaker placed by Dr. Santos at [facility] 3/3/2025.  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  - US pending tomorrow.   - Will notify them of any results  - Discharge from clinic    PATIENT EDUCATION  - Importance of adequate nutrition for wound healing  -Advised to go to ER for any increased redness, swelling, drainage, or odor, or if patient develops fever, chills, nausea or vomiting.     My total time spent caring for the patient on the day of the encounter was 20 minutes, reviewing history, assessment, counseling and education, and coordination of care.  This does not include time spent on separately billable procedures/tests.    Please note that this note may have been created using voice recognition software. I have worked with technical experts from [employer] to optimize the interface.  I have made every reasonable attempt to correct obvious errors, but there may be errors of grammar and possibly content that I did not discover before  finalizing the note.    N

## 2025-04-24 ENCOUNTER — HOSPITAL ENCOUNTER (OUTPATIENT)
Dept: RADIOLOGY | Facility: MEDICAL CENTER | Age: 89
End: 2025-04-24
Attending: STUDENT IN AN ORGANIZED HEALTH CARE EDUCATION/TRAINING PROGRAM
Payer: MEDICARE

## 2025-04-24 DIAGNOSIS — Z95.0 S/P PLACEMENT OF CARDIAC PACEMAKER: ICD-10-CM

## 2025-04-24 DIAGNOSIS — S31.109D WOUND OF RIGHT GROIN, SUBSEQUENT ENCOUNTER: ICD-10-CM

## 2025-04-24 PROCEDURE — 76882 US LMTD JT/FCL EVL NVASC XTR: CPT | Mod: RT

## 2025-04-30 ENCOUNTER — APPOINTMENT (OUTPATIENT)
Dept: WOUND CARE | Facility: MEDICAL CENTER | Age: 89
End: 2025-04-30
Attending: NURSE PRACTITIONER
Payer: MEDICARE

## 2025-05-07 ENCOUNTER — APPOINTMENT (OUTPATIENT)
Dept: WOUND CARE | Facility: MEDICAL CENTER | Age: 89
End: 2025-05-07
Attending: NURSE PRACTITIONER
Payer: MEDICARE

## 2025-05-14 ENCOUNTER — APPOINTMENT (OUTPATIENT)
Dept: WOUND CARE | Facility: MEDICAL CENTER | Age: 89
End: 2025-05-14
Attending: NURSE PRACTITIONER
Payer: MEDICARE

## 2025-05-21 ENCOUNTER — APPOINTMENT (OUTPATIENT)
Dept: WOUND CARE | Facility: MEDICAL CENTER | Age: 89
End: 2025-05-21
Attending: NURSE PRACTITIONER
Payer: MEDICARE

## 2025-05-28 ENCOUNTER — APPOINTMENT (OUTPATIENT)
Dept: WOUND CARE | Facility: MEDICAL CENTER | Age: 89
End: 2025-05-28
Attending: NURSE PRACTITIONER
Payer: MEDICARE

## (undated) DEVICE — SODIUM CHL. IRRIGATION 0.9% 3000ML (4EA/CA 65CA/PF)

## (undated) DEVICE — SUTURE GENERAL

## (undated) DEVICE — SENSOR SPO2 NEO LNCS ADHESIVE (20/BX) SEE USER NOTES

## (undated) DEVICE — MASK ANESTHESIA ADULT  - (100/CA)

## (undated) DEVICE — SODIUM CHL IRRIGATION 0.9% 1000ML (12EA/CA)

## (undated) DEVICE — SUTURE 2-0 MONOCRYL PLUS UNDYED CT-1 1 X 36 (36EA/BX)"

## (undated) DEVICE — NEPTUNE 4 PORT MANIFOLD - (20/PK)

## (undated) DEVICE — Device

## (undated) DEVICE — MIXER BONE CEMENT REVOLUTION - W/FEMORAL PRESSURIZER (6/CA)

## (undated) DEVICE — DRESSING AQUACEL AG ADVANTAGE 3.5 X 10" (10EA/BX)"

## (undated) DEVICE — CUFF 30 X 4 TOURNIQUET 2 PORT DISPOSABLE STERILE (10EA/BX)

## (undated) DEVICE — GLOVE 7.0 LF PF PROTEXIS (50PR/BX)

## (undated) DEVICE — PAD UNIVERSAL MULTI USE (1/EA)

## (undated) DEVICE — KIT ROOM DECONTAMINATION

## (undated) DEVICE — BLADE SAGITTAL 6 SYSTEM 25MM

## (undated) DEVICE — HANDPIECE 10FT INTPLS SCT PLS IRRIGATION HAND CONTROL SET (6/PK)

## (undated) DEVICE — PIN TROCAR GEN 1/8X3 (4EA/BX)

## (undated) DEVICE — GLOVE BIOGEL PI INDICATOR SZ 8.0 SURGICAL PF LF -(50/BX 4BX/CA)

## (undated) DEVICE — GOWN WARMING STANDARD FLEX - (30/CA)

## (undated) DEVICE — GLOVE 6.5 LF PF PROTEXIS (50PR/BX)

## (undated) DEVICE — BLOCK

## (undated) DEVICE — GLOVE BIOGEL PI ORTHO SZ 7.5 PF LF (40PR/BX)

## (undated) DEVICE — NEEDLE W/FACET TIP DULL VERSION W/STIMULATION CABLE SONOPLEX 21G X 4 (10/EA)"

## (undated) DEVICE — GLOVE BIOGEL PI ULTRATOUCH SZ 8.5 SURGICAL PF LF - (200PR/CA)

## (undated) DEVICE — GLOVE BIOGEL PI INDICATOR SZ 6.5 SURGICAL PF LF - (50/BX 4BX/CA)

## (undated) DEVICE — ELECTRODE DUAL RETURN W/ CORD - (50/PK)

## (undated) DEVICE — PIN TROCAR GNS 1/8INX5IN (1EA)

## (undated) DEVICE — PROTECTOR ULNA NERVE - (36PR/CA)

## (undated) DEVICE — KIT ANESTHESIA W/CIRCUIT & 3/LT BAG W/FILTER (20EA/CA)

## (undated) DEVICE — GLOVE, LITE (PAIR)

## (undated) DEVICE — ELECTRODE 850 FOAM ADHESIVE - HYDROGEL RADIOTRNSPRNT (50/PK)

## (undated) DEVICE — MASK, LARYNGEAL AIRWAY #4

## (undated) DEVICE — HEAD HOLDER JUNIOR/ADULT

## (undated) DEVICE — TIP INTPLS HFLO ML ORFC BTRY - (12/CS)  FOR SURGILAV

## (undated) DEVICE — STOCKINETTE IMPERVIOUS 12X48 - STERILELF (10/CA)"